# Patient Record
Sex: FEMALE | Race: WHITE | NOT HISPANIC OR LATINO | Employment: OTHER | URBAN - METROPOLITAN AREA
[De-identification: names, ages, dates, MRNs, and addresses within clinical notes are randomized per-mention and may not be internally consistent; named-entity substitution may affect disease eponyms.]

---

## 2017-02-10 RX ORDER — FOLIC ACID 1 MG/1
1 TABLET ORAL DAILY
Qty: 90 TABLET | Refills: 1 | Status: SHIPPED | OUTPATIENT
Start: 2017-02-10 | End: 2017-02-13 | Stop reason: SDUPTHER

## 2017-02-13 DIAGNOSIS — M05.79 RHEUMATOID ARTHRITIS INVOLVING MULTIPLE SITES WITH POSITIVE RHEUMATOID FACTOR: Chronic | ICD-10-CM

## 2017-02-13 DIAGNOSIS — Z79.899 HIGH RISK MEDICATION USE: Primary | ICD-10-CM

## 2017-02-13 RX ORDER — METHOTREXATE 2.5 MG/1
15 TABLET ORAL
Qty: 30 TABLET | Refills: 1 | OUTPATIENT
Start: 2017-02-13 | End: 2017-03-14

## 2017-02-13 RX ORDER — LEFLUNOMIDE 10 MG/1
10 TABLET ORAL DAILY
Qty: 30 TABLET | Refills: 2 | OUTPATIENT
Start: 2017-02-13 | End: 2018-02-13

## 2017-02-13 RX ORDER — FOLIC ACID 1 MG/1
1 TABLET ORAL DAILY
Qty: 90 TABLET | Refills: 1 | Status: SHIPPED | OUTPATIENT
Start: 2017-02-13 | End: 2017-03-03 | Stop reason: SDUPTHER

## 2017-02-14 ENCOUNTER — LAB VISIT (OUTPATIENT)
Dept: LAB | Facility: HOSPITAL | Age: 57
End: 2017-02-14
Attending: INTERNAL MEDICINE
Payer: COMMERCIAL

## 2017-02-14 DIAGNOSIS — M05.79 RHEUMATOID ARTHRITIS INVOLVING MULTIPLE SITES WITH POSITIVE RHEUMATOID FACTOR: Chronic | ICD-10-CM

## 2017-02-14 DIAGNOSIS — Z79.899 HIGH RISK MEDICATION USE: ICD-10-CM

## 2017-02-14 LAB
ALBUMIN SERPL BCP-MCNC: 4.2 G/DL
ALP SERPL-CCNC: 98 U/L
ALT SERPL W/O P-5'-P-CCNC: 56 U/L
ANION GAP SERPL CALC-SCNC: 11 MMOL/L
AST SERPL-CCNC: 34 U/L
BASOPHILS # BLD AUTO: 0.03 K/UL
BASOPHILS NFR BLD: 0.4 %
BILIRUB SERPL-MCNC: 0.2 MG/DL
BUN SERPL-MCNC: 9 MG/DL
CALCIUM SERPL-MCNC: 9.8 MG/DL
CHLORIDE SERPL-SCNC: 104 MMOL/L
CO2 SERPL-SCNC: 25 MMOL/L
CREAT SERPL-MCNC: 0.8 MG/DL
CRP SERPL-MCNC: 3.8 MG/L
DIFFERENTIAL METHOD: ABNORMAL
EOSINOPHIL # BLD AUTO: 0.1 K/UL
EOSINOPHIL NFR BLD: 1.6 %
ERYTHROCYTE [DISTWIDTH] IN BLOOD BY AUTOMATED COUNT: 14.3 %
EST. GFR  (AFRICAN AMERICAN): >60 ML/MIN/1.73 M^2
EST. GFR  (NON AFRICAN AMERICAN): >60 ML/MIN/1.73 M^2
GLUCOSE SERPL-MCNC: 87 MG/DL
HCT VFR BLD AUTO: 41 %
HGB BLD-MCNC: 13.6 G/DL
LYMPHOCYTES # BLD AUTO: 1.6 K/UL
LYMPHOCYTES NFR BLD: 21.1 %
MCH RBC QN AUTO: 32 PG
MCHC RBC AUTO-ENTMCNC: 33.2 %
MCV RBC AUTO: 97 FL
MONOCYTES # BLD AUTO: 0.8 K/UL
MONOCYTES NFR BLD: 10.3 %
NEUTROPHILS # BLD AUTO: 5.1 K/UL
NEUTROPHILS NFR BLD: 66.3 %
PLATELET # BLD AUTO: 263 K/UL
PMV BLD AUTO: 9.9 FL
POTASSIUM SERPL-SCNC: 3.7 MMOL/L
PROT SERPL-MCNC: 7.4 G/DL
RBC # BLD AUTO: 4.25 M/UL
SODIUM SERPL-SCNC: 140 MMOL/L
WBC # BLD AUTO: 7.66 K/UL

## 2017-02-14 PROCEDURE — 86140 C-REACTIVE PROTEIN: CPT

## 2017-02-14 PROCEDURE — 36415 COLL VENOUS BLD VENIPUNCTURE: CPT | Mod: PO

## 2017-02-14 PROCEDURE — 85025 COMPLETE CBC W/AUTO DIFF WBC: CPT

## 2017-02-14 PROCEDURE — 80053 COMPREHEN METABOLIC PANEL: CPT

## 2017-02-14 NOTE — TELEPHONE ENCOUNTER
Maru, this patient started leflunomide in Nov with f/u planned 3 mo but does not have lab or EP scheduled; please talk to her to get lab now and see how she is.

## 2017-02-15 DIAGNOSIS — M05.79 RHEUMATOID ARTHRITIS INVOLVING MULTIPLE SITES WITH POSITIVE RHEUMATOID FACTOR: Chronic | ICD-10-CM

## 2017-02-15 RX ORDER — CELECOXIB 100 MG/1
100 CAPSULE ORAL 2 TIMES DAILY PRN
Qty: 60 CAPSULE | Refills: 5 | Status: SHIPPED | OUTPATIENT
Start: 2017-02-15 | End: 2017-03-03 | Stop reason: CLARIF

## 2017-02-15 RX ORDER — LEFLUNOMIDE 10 MG/1
10 TABLET ORAL DAILY
Qty: 30 TABLET | Refills: 2 | Status: SHIPPED | OUTPATIENT
Start: 2017-02-15 | End: 2017-02-16 | Stop reason: SDUPTHER

## 2017-02-15 RX ORDER — METHOTREXATE 2.5 MG/1
15 TABLET ORAL
Qty: 30 TABLET | Refills: 1 | Status: SHIPPED | OUTPATIENT
Start: 2017-02-15 | End: 2017-02-27 | Stop reason: SDUPTHER

## 2017-02-15 NOTE — PROGRESS NOTES
[facility] sent Rx back , requiring prior auth.  I will send to [facility] to see if they can get authorization and fill the Rx.  ABHIJEET

## 2017-02-16 DIAGNOSIS — M05.79 RHEUMATOID ARTHRITIS INVOLVING MULTIPLE SITES WITH POSITIVE RHEUMATOID FACTOR: Chronic | ICD-10-CM

## 2017-02-16 RX ORDER — LEFLUNOMIDE 10 MG/1
10 TABLET ORAL DAILY
Qty: 30 TABLET | Refills: 2 | Status: SHIPPED | OUTPATIENT
Start: 2017-02-16 | End: 2017-02-27 | Stop reason: SDUPTHER

## 2017-02-27 DIAGNOSIS — M05.79 RHEUMATOID ARTHRITIS INVOLVING MULTIPLE SITES WITH POSITIVE RHEUMATOID FACTOR: Chronic | ICD-10-CM

## 2017-02-27 RX ORDER — LEFLUNOMIDE 10 MG/1
10 TABLET ORAL DAILY
Qty: 30 TABLET | Refills: 2 | Status: SHIPPED | OUTPATIENT
Start: 2017-02-27 | End: 2017-03-03 | Stop reason: SDUPTHER

## 2017-02-27 RX ORDER — METHOTREXATE 2.5 MG/1
15 TABLET ORAL
Qty: 30 TABLET | Refills: 2 | Status: SHIPPED | OUTPATIENT
Start: 2017-02-27 | End: 2017-03-03 | Stop reason: SDUPTHER

## 2017-03-03 ENCOUNTER — OFFICE VISIT (OUTPATIENT)
Dept: RHEUMATOLOGY | Facility: CLINIC | Age: 57
End: 2017-03-03
Payer: COMMERCIAL

## 2017-03-03 VITALS
HEART RATE: 82 BPM | BODY MASS INDEX: 32.54 KG/M2 | DIASTOLIC BLOOD PRESSURE: 87 MMHG | WEIGHT: 214.69 LBS | SYSTOLIC BLOOD PRESSURE: 150 MMHG | HEIGHT: 68 IN

## 2017-03-03 DIAGNOSIS — D84.9 IMMUNOSUPPRESSION: ICD-10-CM

## 2017-03-03 DIAGNOSIS — B37.31 VAGINAL YEAST INFECTION: ICD-10-CM

## 2017-03-03 DIAGNOSIS — J01.90 ACUTE RHINOSINUSITIS: Primary | ICD-10-CM

## 2017-03-03 DIAGNOSIS — M05.79 RHEUMATOID ARTHRITIS INVOLVING MULTIPLE SITES WITH POSITIVE RHEUMATOID FACTOR: Chronic | ICD-10-CM

## 2017-03-03 PROCEDURE — 99999 PR PBB SHADOW E&M-EST. PATIENT-LVL III: CPT | Mod: PBBFAC,,, | Performed by: INTERNAL MEDICINE

## 2017-03-03 PROCEDURE — 99214 OFFICE O/P EST MOD 30 MIN: CPT | Mod: S$GLB,,, | Performed by: INTERNAL MEDICINE

## 2017-03-03 PROCEDURE — 1160F RVW MEDS BY RX/DR IN RCRD: CPT | Mod: S$GLB,,, | Performed by: INTERNAL MEDICINE

## 2017-03-03 RX ORDER — DOXYCYCLINE HYCLATE 100 MG
100 TABLET ORAL 2 TIMES DAILY
Qty: 10 TABLET | Refills: 0 | Status: SHIPPED | OUTPATIENT
Start: 2017-03-03 | End: 2017-09-13

## 2017-03-03 RX ORDER — FLUCONAZOLE 150 MG/1
TABLET ORAL
Qty: 2 TABLET | Refills: 2 | Status: SHIPPED | OUTPATIENT
Start: 2017-03-03 | End: 2017-06-28 | Stop reason: SDUPTHER

## 2017-03-03 RX ORDER — METHOTREXATE 2.5 MG/1
15 TABLET ORAL
Qty: 78 TABLET | Refills: 0 | Status: SHIPPED | OUTPATIENT
Start: 2017-03-03 | End: 2017-05-23 | Stop reason: SDUPTHER

## 2017-03-03 RX ORDER — LEFLUNOMIDE 20 MG/1
20 TABLET ORAL DAILY
Qty: 90 TABLET | Refills: 0 | Status: SHIPPED | OUTPATIENT
Start: 2017-03-03 | End: 2017-03-27 | Stop reason: SDUPTHER

## 2017-03-03 RX ORDER — FOLIC ACID 1 MG/1
1 TABLET ORAL DAILY
Qty: 90 TABLET | Refills: 3 | Status: SHIPPED | OUTPATIENT
Start: 2017-03-03 | End: 2017-09-13 | Stop reason: SDUPTHER

## 2017-03-03 RX ORDER — MELOXICAM 15 MG/1
15 TABLET ORAL DAILY
Qty: 90 TABLET | Refills: 3 | Status: SHIPPED | OUTPATIENT
Start: 2017-03-03 | End: 2017-06-12 | Stop reason: SDUPTHER

## 2017-03-03 ASSESSMENT — ROUTINE ASSESSMENT OF PATIENT INDEX DATA (RAPID3)
PATIENT GLOBAL ASSESSMENT SCORE: 7.5
TOTAL RAPID3 SCORE: 6.66
PAIN SCORE: 7.5
AM STIFFNESS SCORE: 1, YES
FATIGUE SCORE: 7.5
PSYCHOLOGICAL DISTRESS SCORE: 7.7
WHEN YOU AWAKENED IN THE MORNING OVER THE LAST WEEK, PLEASE INDICATE THE AMOUNT OF TIME IT TAKES UNTIL YOU ARE AS LIMBER AS YOU WILL BE FOR THE DAY: 2 HOURS
MDHAQ FUNCTION SCORE: 1.5

## 2017-03-03 NOTE — PROGRESS NOTES
"Subjective:       Patient ID: Sherrell Yip is a 57 y.o. female.    Chief Complaint: Disease Management            Associated symptoms include fatigue, fever and headaches.          James with school system       CCP+ RA with positive ANTONIA    MTX since Oct 2015     mg/d since April 2016 -Nov 2016 when switched to LFA  LFA added Nov 2016  Prednisone bridge 1 month, July 2015  Celebrex      Currently:  MTX 15 mg / week  LFA 10 mg/d  Ibuprofen 800 mg prescribed TID but she is taking BID     Also cymbalta for depression and neck pain from disk and LUE pain after breast surgery  Hx Breast Ca, s/p Femara for 5 years    Off of work since October 12; now extended from short to long term disability    More joint pain B shoulders; arm pain disrupts sleep  Hands still swollen and pain with activities  Notes hands "turning" more  L hip pain  R bunion pain  Has cut back on house work and cooking    2 hours morning stiffness    Review of Systems   Constitutional: Positive for fatigue, fever and unexpected weight change.   HENT: Positive for mouth sores, postnasal drip and rhinorrhea.         Dry mouth   Eyes: Positive for redness.        Dry eyes   Respiratory: Negative for cough and shortness of breath.    Cardiovascular: Negative for chest pain.   Gastrointestinal: Negative for abdominal pain and diarrhea.   Skin: Positive for rash.        Dry patches of skin on arms   Neurological: Positive for headaches.   Psychiatric/Behavioral: Negative for sleep disturbance.         Objective:     BP (!) 150/87  Pulse 82  Ht 5' 8" (1.727 m)  Wt 97.4 kg (214 lb 11.2 oz)  BMI 32.65 kg/m2     Physical Exam   Constitutional: She is well-developed, well-nourished, and in no distress.   HENT:   Mouth/Throat: Oropharynx is clear and moist.   Mucus nasal d/c   Eyes: Conjunctivae are normal.   Cardiovascular: Normal heart sounds.    Pulmonary/Chest: Breath sounds normal.       Right Side Rheumatological Exam     The patient is tender to " palpation of the shoulder, wrist, 1st MCP, 2nd MCP and 3rd MCP    She has swelling of the wrist, 1st MCP, 2nd MCP and 3rd MCP    Left Side Rheumatological Exam     The patient is tender to palpation of the shoulder, elbow, wrist, 1st MCP, 2nd MCP, 3rd MCP and hip.    She has swelling of the elbow, wrist, 1st MCP, 2nd MCP, 3rd MCP and 4th PIP      Lymphadenopathy:     She has no cervical adenopathy.   Neurological: She is alert.   Skin: No rash noted.     Musculoskeletal:   Fingers wrists are swollen and tender  L elbow is swollen tender and has a flexion contracture  Shoulders are tender  L Hip are tender on rotation      Feb 14  C-reactive protein 3.8  Assessment:       1. Acute rhinosinusitis    2. Rheumatoid arthritis involving multiple sites with positive rheumatoid factor    3. Immunosuppression    4. Vaginal yeast infection        Rheumatoid arthritis activity has not improved with switch from HCQ to LFA on background mtx    Sl increased AST on current therapy    She has early deformities including the L elbow flexion contracture     She is not able to return to work.  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    Plan:     1.  Increase LFA to 20 mg/d  2. Cont MTX 15 mg/week  3. Check lab monthly to monitor liver enzymes, CBC, and crp while on combination therapy  4. Unable to return to work   5. Doxycycline for URI, Diflucan for vaginal yeast  6. Replace ibuprofen with meloxicam 15 mg/d  7. F/u BP with PCP  8.   Return to me in 3 months if not sooner; next step is biologic DMARD therapy

## 2017-03-03 NOTE — MR AVS SNAPSHOT
[facility]  [address]  [facility] [address]  Phone: [phone]  Fax: [phone]                  Sherrell Davisirez   3/3/2017 11:00 AM   Office Visit    Description:  Female : 1960   Provider:  Kian Buchanan MD   Department:  [facility]           Reason for Visit     Disease Management           Diagnoses this Visit        Comments    Acute rhinosinusitis    -  Primary     Rheumatoid arthritis involving multiple sites with positive rheumatoid factor         Immunosuppression         Vaginal yeast infection                To Do List           Goals (5 Years of Data)     None       These Medications        Disp Refills Start End    leflunomide (ARAVA) 20 MG Tab 90 tablet 0 3/3/2017 3/3/2018    Take 1 tablet (20 mg total) by mouth once daily. - Oral    Pharmacy: [facility] - [address] Ph #: [phone]       meloxicam (MOBIC) 15 MG tablet 90 tablet 3 3/3/2017     Take 1 tablet (15 mg total) by mouth once daily. - Oral    Pharmacy: [facility] - [address] Ph #: [phone]       methotrexate 2.5 MG Tab 78 tablet 0 3/3/2017 2017    Take 6 tablets (15 mg total) by mouth every 7 days. - Oral    Pharmacy: [facility] - [address] Ph #: [phone]       folic acid (FOLVITE) 1 MG tablet 90 tablet 3 3/3/2017 3/3/2018    Take 1 tablet (1 mg total) by mouth once daily. - Oral    Pharmacy: [facility] - [address] Ph #: [phone]       fluconazole (DIFLUCAN) 150 MG Tab 2 tablet 2 3/3/2017     Take one pill by mouth then repeat in 3 days if needed.    Pharmacy: [facility] #[id number] - [place] - [address] [facility] Ph #: [phone]       doxycycline (VIBRA-TABS) 100 MG tablet 10 tablet 0 3/3/2017     Take 1 tablet (100 mg total) by mouth 2 (two) times daily. - Oral     Pharmacy: [facility] #[id number] - [place] - [address]  #: [phone]         [facility]     [facility] On Call Nurse [facility] Line - 24/7 Assistance  Registered nurses in the [facility] On Call Center provide clinical advisement, health education, appointment booking, and other advisory services.  Call for this free service at [phone].             Medications           Message regarding Medications     Verify the changes and/or additions to your medication regime listed below are the same as discussed with your clinician today.  If any of these changes or additions are incorrect, please notify your healthcare provider.        START taking these NEW medications        Refills    meloxicam (MOBIC) 15 MG tablet 3    Sig: Take 1 tablet (15 mg total) by mouth once daily.    Class: Normal    Route: Oral    doxycycline (VIBRA-TABS) 100 MG tablet 0    Sig: Take 1 tablet (100 mg total) by mouth 2 (two) times daily.    Class: Normal    Route: Oral      CHANGE how you are taking these medications     Start Taking Instead of    leflunomide (ARAVA) 20 MG Tab leflunomide (ARAVA) 10 MG Tab    Dosage:  Take 1 tablet (20 mg total) by mouth once daily. Dosage:  Take 1 tablet (10 mg total) by mouth once daily.    Reason for Change:  Reorder       STOP taking these medications     celecoxib (CELEBREX) 100 MG capsule Take 1 capsule (100 mg total) by mouth 2 (two) times daily as needed for Pain (arthritis).           Verify that the below list of medications is an accurate representation of the medications you are currently taking.  If none reported, the list may be blank. If incorrect, please contact your healthcare provider. Carry this list with you in case of emergency.           Current Medications     alendronate (FOSAMAX) 70 MG tablet     duloxetine (CYMBALTA) 30 MG capsule Take 30 mg by mouth nightly.    fluconazole (DIFLUCAN) 150 MG Tab Take one pill by mouth then repeat in 3 days if needed.    FOLBIC 2.5-25-2  "mg Tab Take 1 tablet by mouth once daily.    folic acid (FOLVITE) 1 MG tablet Take 1 tablet (1 mg total) by mouth once daily.    furosemide (LASIX) 20 MG tablet     hydrochlorothiazide (HYDRODIURIL) 25 MG tablet Take 25 mg by mouth once daily.    leflunomide (ARAVA) 20 MG Tab Take 1 tablet (20 mg total) by mouth once daily.    methotrexate 2.5 MG Tab Take 6 tablets (15 mg total) by mouth every 7 days.    METOPROLOL SUCCINATE (TOPROL XL ORAL) Take by mouth.    potassium chloride (MICRO-K) 10 MEQ CpSR Take 10 mEq by mouth once daily.    SYNTHROID 112 mcg tablet Take 112 mcg by mouth once daily.    doxycycline (VIBRA-TABS) 100 MG tablet Take 1 tablet (100 mg total) by mouth 2 (two) times daily.    meloxicam (MOBIC) 15 MG tablet Take 1 tablet (15 mg total) by mouth once daily.           Clinical Reference Information           Your Vitals Were     BP Pulse Height Weight BMI    150/87 82 5' 8" (1.727 m) 97.4 kg (214 lb 11.2 oz) 32.65 kg/m2      Blood Pressure          Most Recent Value    BP  (!)  150/87      Allergies as of 3/3/2017     Penicillins    Sulfa (Sulfonamide Antibiotics)    Flagyl [Metronidazole Hcl]      Immunizations Administered on Date of Encounter - 3/3/2017     None      [facility] Sign-Up     Activating your [facility] account is as easy as 1-2-3!     1) Visit [email or web address], select Sign Up Now, enter this activation code and your date of birth, then select Next.  Activation code not generated  Current Patient Portal Status: Account disabled      2) Create a username and password to use when you visit [facility] in the future and select a security question in case you lose your password and select Next.    3) Enter your e-mail address and click Sign Up!    Additional Information  If you have questions, please e-mail [email or web address] or call [phone] to talk to our [facility] staff. Remember, [facility] is NOT to be used for urgent needs. For medical emergencies, dial 911.         Language " Assistance Services     ATTENTION: Language assistance services are available, free of charge. Please call [phone].      ATENCIÓN: Si habla jamaañol, tiene a healy disposición servicios gratuitos de asistencia lingüística. Llame al [phone].     CHÚ Ý: N?u b?n nói Ti?ng Vi?t, có các d?ch v? h? tr? ngôn ng? mi?n phí dành cho b?n. G?i s? [phone].         [facility] complies with applicable Federal civil rights laws and does not discriminate on the basis of race, color, national origin, age, disability, or sex.

## 2017-03-18 ENCOUNTER — LAB VISIT (OUTPATIENT)
Dept: LAB | Facility: HOSPITAL | Age: 57
End: 2017-03-18
Attending: INTERNAL MEDICINE
Payer: COMMERCIAL

## 2017-03-18 DIAGNOSIS — Z79.899 HIGH RISK MEDICATION USE: ICD-10-CM

## 2017-03-18 DIAGNOSIS — M05.79 RHEUMATOID ARTHRITIS INVOLVING MULTIPLE SITES WITH POSITIVE RHEUMATOID FACTOR: Chronic | ICD-10-CM

## 2017-03-18 LAB
ALBUMIN SERPL BCP-MCNC: 4 G/DL
ALP SERPL-CCNC: 93 U/L
ALT SERPL W/O P-5'-P-CCNC: 63 U/L
ANION GAP SERPL CALC-SCNC: 7 MMOL/L
AST SERPL-CCNC: 45 U/L
BASOPHILS # BLD AUTO: 0.05 K/UL
BASOPHILS NFR BLD: 1.1 %
BILIRUB SERPL-MCNC: 0.5 MG/DL
BUN SERPL-MCNC: 9 MG/DL
CALCIUM SERPL-MCNC: 9.5 MG/DL
CHLORIDE SERPL-SCNC: 106 MMOL/L
CO2 SERPL-SCNC: 28 MMOL/L
CREAT SERPL-MCNC: 0.8 MG/DL
CRP SERPL-MCNC: 6.1 MG/L
DIFFERENTIAL METHOD: ABNORMAL
EOSINOPHIL # BLD AUTO: 0.1 K/UL
EOSINOPHIL NFR BLD: 3 %
ERYTHROCYTE [DISTWIDTH] IN BLOOD BY AUTOMATED COUNT: 14.8 %
EST. GFR  (AFRICAN AMERICAN): >60 ML/MIN/1.73 M^2
EST. GFR  (NON AFRICAN AMERICAN): >60 ML/MIN/1.73 M^2
GLUCOSE SERPL-MCNC: 96 MG/DL
HCT VFR BLD AUTO: 40.5 %
HGB BLD-MCNC: 13.1 G/DL
LYMPHOCYTES # BLD AUTO: 1.2 K/UL
LYMPHOCYTES NFR BLD: 25.6 %
MCH RBC QN AUTO: 31.6 PG
MCHC RBC AUTO-ENTMCNC: 32.3 %
MCV RBC AUTO: 98 FL
MONOCYTES # BLD AUTO: 0.6 K/UL
MONOCYTES NFR BLD: 13.2 %
NEUTROPHILS # BLD AUTO: 2.7 K/UL
NEUTROPHILS NFR BLD: 56.7 %
PLATELET # BLD AUTO: 266 K/UL
PMV BLD AUTO: 10.2 FL
POTASSIUM SERPL-SCNC: 4.2 MMOL/L
PROT SERPL-MCNC: 7.1 G/DL
RBC # BLD AUTO: 4.14 M/UL
SODIUM SERPL-SCNC: 141 MMOL/L
WBC # BLD AUTO: 4.69 K/UL

## 2017-03-18 PROCEDURE — 86140 C-REACTIVE PROTEIN: CPT

## 2017-03-18 PROCEDURE — 80053 COMPREHEN METABOLIC PANEL: CPT

## 2017-03-18 PROCEDURE — 36415 COLL VENOUS BLD VENIPUNCTURE: CPT | Mod: PO

## 2017-03-18 PROCEDURE — 85025 COMPLETE CBC W/AUTO DIFF WBC: CPT

## 2017-03-21 ENCOUNTER — TELEPHONE (OUTPATIENT)
Dept: RHEUMATOLOGY | Facility: CLINIC | Age: 57
End: 2017-03-21

## 2017-03-23 ENCOUNTER — TELEPHONE (OUTPATIENT)
Dept: RHEUMATOLOGY | Facility: CLINIC | Age: 57
End: 2017-03-23

## 2017-03-23 DIAGNOSIS — M05.79 RHEUMATOID ARTHRITIS INVOLVING MULTIPLE SITES WITH POSITIVE RHEUMATOID FACTOR: Primary | ICD-10-CM

## 2017-03-23 NOTE — TELEPHONE ENCOUNTER
Spoke to patient.  MTX + LFA causing elevated liver enzymes  Lower doses of meds have not been effective.   Need to change to MTX plus TNF inhibitor to control her RA and avoid liver toxicity.  Discussed TB and lymphoma risk.  She is willing to start Enbrel.  I will order Enbrel to replace leflunomide. Cont MTX.

## 2017-03-23 NOTE — TELEPHONE ENCOUNTER
Patient informed of test results, and Dr. Buchanan will call her to discuss.  Verbalizes understanding.

## 2017-03-23 NOTE — TELEPHONE ENCOUNTER
Do not forget to call patient in regards to discuss switching her medication, due to liver enzymes elevation.

## 2017-03-24 ENCOUNTER — TELEPHONE (OUTPATIENT)
Dept: PHARMACY | Facility: CLINIC | Age: 57
End: 2017-03-24

## 2017-03-24 DIAGNOSIS — Z79.899 HIGH RISK MEDICATION USE: Primary | ICD-10-CM

## 2017-03-24 NOTE — TELEPHONE ENCOUNTER
I do not see one either . 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     Maru please contact her to get Quantiferon done.

## 2017-03-24 NOTE — TELEPHONE ENCOUNTER
Dr Buchanan,    I dont see a quant gold on file for this patient starting Enbrel.  Is it hidden somewheres that I'm missing or has it not been drawn/ordered as of yet?    Juan Alberto Juarez, PharmD, [facility]  [facility]  [phone]

## 2017-03-27 ENCOUNTER — TELEPHONE (OUTPATIENT)
Dept: RHEUMATOLOGY | Facility: CLINIC | Age: 57
End: 2017-03-27

## 2017-03-27 DIAGNOSIS — M05.79 RHEUMATOID ARTHRITIS INVOLVING MULTIPLE SITES WITH POSITIVE RHEUMATOID FACTOR: Chronic | ICD-10-CM

## 2017-03-27 RX ORDER — IBUPROFEN 800 MG/1
TABLET ORAL
Refills: 1 | COMMUNITY
Start: 2017-03-22 | End: 2017-06-12

## 2017-03-27 RX ORDER — LEFLUNOMIDE 10 MG/1
10 TABLET ORAL DAILY
Qty: 90 TABLET | Refills: 0
Start: 2017-03-27 | End: 2017-06-12 | Stop reason: SDUPTHER

## 2017-03-27 NOTE — TELEPHONE ENCOUNTER
She is afraid to take Enbrel because of hx of breast cancer in 2010  She would like to continue meloxicam, leflunomide, and MTX  Reduce LFA to 10 mg/d; cont MTX and meloxicam    D/c Enbrel that she did not start yet    Keep scheduled lab and f/u appts

## 2017-03-27 NOTE — TELEPHONE ENCOUNTER
----- Message from Lizabeth Palma sent at 3/27/2017 11:45 AM CDT -----  Contact: [email or web address]  Patient has questions about the medication which is enbrel patient states its urgent please call patient.

## 2017-03-27 NOTE — TELEPHONE ENCOUNTER
Patient wants to speak to you regarding enbrel.. She is having second thoughts about medication, and wants to discuss, if there is any other medication that she can take.  Please call.

## 2017-03-28 NOTE — TELEPHONE ENCOUNTER
Dr Buchanan,      We are signing off of this case.    Please let us know if we can be of any further assistance for Ms Yip in the future.    All the best.    Juan Alberto Juarez, PharmD, [facility]  [facility]  [phone]

## 2017-04-15 ENCOUNTER — LAB VISIT (OUTPATIENT)
Dept: LAB | Facility: HOSPITAL | Age: 57
End: 2017-04-15
Attending: INTERNAL MEDICINE
Payer: COMMERCIAL

## 2017-04-15 DIAGNOSIS — M05.79 RHEUMATOID ARTHRITIS INVOLVING MULTIPLE SITES WITH POSITIVE RHEUMATOID FACTOR: Chronic | ICD-10-CM

## 2017-04-15 DIAGNOSIS — Z79.899 HIGH RISK MEDICATION USE: ICD-10-CM

## 2017-04-15 LAB
ALBUMIN SERPL BCP-MCNC: 3.6 G/DL
ALP SERPL-CCNC: 87 U/L
ALT SERPL W/O P-5'-P-CCNC: 59 U/L
ANION GAP SERPL CALC-SCNC: 10 MMOL/L
AST SERPL-CCNC: 41 U/L
BASOPHILS # BLD AUTO: 0.03 K/UL
BASOPHILS NFR BLD: 0.7 %
BILIRUB SERPL-MCNC: 0.4 MG/DL
BUN SERPL-MCNC: 7 MG/DL
CALCIUM SERPL-MCNC: 9 MG/DL
CHLORIDE SERPL-SCNC: 107 MMOL/L
CO2 SERPL-SCNC: 25 MMOL/L
CREAT SERPL-MCNC: 0.7 MG/DL
CRP SERPL-MCNC: 5.2 MG/L
DIFFERENTIAL METHOD: ABNORMAL
EOSINOPHIL # BLD AUTO: 0.2 K/UL
EOSINOPHIL NFR BLD: 3.6 %
ERYTHROCYTE [DISTWIDTH] IN BLOOD BY AUTOMATED COUNT: 14.3 %
EST. GFR  (AFRICAN AMERICAN): >60 ML/MIN/1.73 M^2
EST. GFR  (NON AFRICAN AMERICAN): >60 ML/MIN/1.73 M^2
GLUCOSE SERPL-MCNC: 101 MG/DL
HCT VFR BLD AUTO: 39.5 %
HGB BLD-MCNC: 13 G/DL
LYMPHOCYTES # BLD AUTO: 0.9 K/UL
LYMPHOCYTES NFR BLD: 21 %
MCH RBC QN AUTO: 31.8 PG
MCHC RBC AUTO-ENTMCNC: 32.9 %
MCV RBC AUTO: 97 FL
MONOCYTES # BLD AUTO: 0.7 K/UL
MONOCYTES NFR BLD: 17.1 %
NEUTROPHILS # BLD AUTO: 2.4 K/UL
NEUTROPHILS NFR BLD: 57.4 %
PLATELET # BLD AUTO: 238 K/UL
PMV BLD AUTO: 9.7 FL
POTASSIUM SERPL-SCNC: 3.9 MMOL/L
PROT SERPL-MCNC: 6.8 G/DL
RBC # BLD AUTO: 4.09 M/UL
SODIUM SERPL-SCNC: 142 MMOL/L
WBC # BLD AUTO: 4.15 K/UL

## 2017-04-15 PROCEDURE — 80053 COMPREHEN METABOLIC PANEL: CPT

## 2017-04-15 PROCEDURE — 85025 COMPLETE CBC W/AUTO DIFF WBC: CPT

## 2017-04-15 PROCEDURE — 86140 C-REACTIVE PROTEIN: CPT

## 2017-04-15 PROCEDURE — 36415 COLL VENOUS BLD VENIPUNCTURE: CPT | Mod: PO

## 2017-04-19 ENCOUNTER — TELEPHONE (OUTPATIENT)
Dept: RHEUMATOLOGY | Facility: CLINIC | Age: 57
End: 2017-04-19

## 2017-04-19 NOTE — TELEPHONE ENCOUNTER
----- Message from Kian Buchanan MD sent at 4/19/2017 10:26 AM CDT -----  Tell her liver enzymes are not normal but have improved and she can continue the current medicines.

## 2017-04-26 ENCOUNTER — TELEPHONE (OUTPATIENT)
Dept: RHEUMATOLOGY | Facility: CLINIC | Age: 57
End: 2017-04-26

## 2017-05-11 ENCOUNTER — LAB VISIT (OUTPATIENT)
Dept: LAB | Facility: HOSPITAL | Age: 57
End: 2017-05-11
Attending: INTERNAL MEDICINE
Payer: COMMERCIAL

## 2017-05-11 DIAGNOSIS — M05.79 RHEUMATOID ARTHRITIS INVOLVING MULTIPLE SITES WITH POSITIVE RHEUMATOID FACTOR: Chronic | ICD-10-CM

## 2017-05-11 DIAGNOSIS — Z79.899 HIGH RISK MEDICATION USE: ICD-10-CM

## 2017-05-11 LAB
ALBUMIN SERPL BCP-MCNC: 3.8 G/DL
ALP SERPL-CCNC: 89 U/L
ALT SERPL W/O P-5'-P-CCNC: 78 U/L
ANION GAP SERPL CALC-SCNC: 11 MMOL/L
AST SERPL-CCNC: 49 U/L
BASOPHILS # BLD AUTO: 0.04 K/UL
BASOPHILS NFR BLD: 1.1 %
BILIRUB SERPL-MCNC: 0.4 MG/DL
BUN SERPL-MCNC: 7 MG/DL
CALCIUM SERPL-MCNC: 9.2 MG/DL
CHLORIDE SERPL-SCNC: 107 MMOL/L
CO2 SERPL-SCNC: 22 MMOL/L
CREAT SERPL-MCNC: 0.8 MG/DL
CRP SERPL-MCNC: 4.6 MG/L
DIFFERENTIAL METHOD: ABNORMAL
EOSINOPHIL # BLD AUTO: 0.1 K/UL
EOSINOPHIL NFR BLD: 3.2 %
ERYTHROCYTE [DISTWIDTH] IN BLOOD BY AUTOMATED COUNT: 14.2 %
EST. GFR  (AFRICAN AMERICAN): >60 ML/MIN/1.73 M^2
EST. GFR  (NON AFRICAN AMERICAN): >60 ML/MIN/1.73 M^2
GLUCOSE SERPL-MCNC: 128 MG/DL
HCT VFR BLD AUTO: 39.1 %
HGB BLD-MCNC: 13.1 G/DL
LYMPHOCYTES # BLD AUTO: 0.8 K/UL
LYMPHOCYTES NFR BLD: 22.3 %
MCH RBC QN AUTO: 32.3 PG
MCHC RBC AUTO-ENTMCNC: 33.5 %
MCV RBC AUTO: 96 FL
MONOCYTES # BLD AUTO: 0.5 K/UL
MONOCYTES NFR BLD: 11.9 %
NEUTROPHILS # BLD AUTO: 2.3 K/UL
NEUTROPHILS NFR BLD: 61.5 %
PLATELET # BLD AUTO: 241 K/UL
PMV BLD AUTO: 9.5 FL
POTASSIUM SERPL-SCNC: 3.9 MMOL/L
PROT SERPL-MCNC: 7 G/DL
RBC # BLD AUTO: 4.06 M/UL
SODIUM SERPL-SCNC: 140 MMOL/L
WBC # BLD AUTO: 3.77 K/UL

## 2017-05-11 PROCEDURE — 80053 COMPREHEN METABOLIC PANEL: CPT

## 2017-05-11 PROCEDURE — 86140 C-REACTIVE PROTEIN: CPT

## 2017-05-11 PROCEDURE — 36415 COLL VENOUS BLD VENIPUNCTURE: CPT | Mod: PO

## 2017-05-11 PROCEDURE — 85025 COMPLETE CBC W/AUTO DIFF WBC: CPT

## 2017-05-12 ENCOUNTER — TELEPHONE (OUTPATIENT)
Dept: RHEUMATOLOGY | Facility: CLINIC | Age: 57
End: 2017-05-12

## 2017-05-12 NOTE — TELEPHONE ENCOUNTER
----- Message from Kian Buchanan MD sent at 5/11/2017  5:18 PM CDT -----  Ast/alt still elevated on MTX and LFA  Will discuss when patient returns next month.

## 2017-05-12 NOTE — TELEPHONE ENCOUNTER
Informed of lab results and will discuss further at next clinic visit on 6/12/17.  Verbalizes understanding.

## 2017-05-23 DIAGNOSIS — M05.79 RHEUMATOID ARTHRITIS INVOLVING MULTIPLE SITES WITH POSITIVE RHEUMATOID FACTOR: Chronic | ICD-10-CM

## 2017-05-23 RX ORDER — METHOTREXATE 2.5 MG/1
TABLET ORAL
Qty: 78 TABLET | Refills: 0 | Status: SHIPPED | OUTPATIENT
Start: 2017-05-23 | End: 2017-08-21 | Stop reason: SDUPTHER

## 2017-06-12 ENCOUNTER — OFFICE VISIT (OUTPATIENT)
Dept: RHEUMATOLOGY | Facility: CLINIC | Age: 57
End: 2017-06-12
Payer: COMMERCIAL

## 2017-06-12 VITALS
DIASTOLIC BLOOD PRESSURE: 93 MMHG | WEIGHT: 211 LBS | HEIGHT: 68 IN | HEART RATE: 80 BPM | SYSTOLIC BLOOD PRESSURE: 136 MMHG | BODY MASS INDEX: 31.98 KG/M2

## 2017-06-12 DIAGNOSIS — R76.8 ANA POSITIVE: ICD-10-CM

## 2017-06-12 DIAGNOSIS — M05.79 RHEUMATOID ARTHRITIS INVOLVING MULTIPLE SITES WITH POSITIVE RHEUMATOID FACTOR: Primary | Chronic | ICD-10-CM

## 2017-06-12 DIAGNOSIS — Z79.899 HIGH RISK MEDICATION USE: ICD-10-CM

## 2017-06-12 PROCEDURE — 99214 OFFICE O/P EST MOD 30 MIN: CPT | Mod: S$GLB,,, | Performed by: INTERNAL MEDICINE

## 2017-06-12 PROCEDURE — 99999 PR PBB SHADOW E&M-EST. PATIENT-LVL III: CPT | Mod: PBBFAC,,, | Performed by: INTERNAL MEDICINE

## 2017-06-12 RX ORDER — MELOXICAM 15 MG/1
15 TABLET ORAL DAILY PRN
Qty: 90 TABLET | Refills: 3 | Status: SHIPPED | OUTPATIENT
Start: 2017-06-12 | End: 2018-07-01 | Stop reason: SDUPTHER

## 2017-06-12 RX ORDER — HYDROXYCHLOROQUINE SULFATE 200 MG/1
200 TABLET, FILM COATED ORAL 2 TIMES DAILY
Qty: 180 TABLET | Refills: 1 | Status: SHIPPED | OUTPATIENT
Start: 2017-06-12 | End: 2017-09-13 | Stop reason: SDUPTHER

## 2017-06-12 RX ORDER — LEFLUNOMIDE 10 MG/1
10 TABLET ORAL DAILY
Qty: 90 TABLET | Refills: 0
Start: 2017-06-12 | End: 2017-09-13 | Stop reason: SINTOL

## 2017-06-12 ASSESSMENT — ROUTINE ASSESSMENT OF PATIENT INDEX DATA (RAPID3)
WHEN YOU AWAKENED IN THE MORNING OVER THE LAST WEEK, PLEASE INDICATE THE AMOUNT OF TIME IT TAKES UNTIL YOU ARE AS LIMBER AS YOU WILL BE FOR THE DAY: 45 MINUTES
MDHAQ FUNCTION SCORE: .7
FATIGUE SCORE: 5.5
PATIENT GLOBAL ASSESSMENT SCORE: 5.5
TOTAL RAPID3 SCORE: 4.44
AM STIFFNESS SCORE: 1, YES
PAIN SCORE: 5.5
PSYCHOLOGICAL DISTRESS SCORE: 5.5

## 2017-06-12 NOTE — PROGRESS NOTES
Subjective:       Patient ID: Sherrell Yip is a 57 y.o. female.    Chief Complaint: Disease Management            Associated symptoms include fatigue and fever. Pertinent negatives include no headaches.          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   Just got approved for SSI      CCP+ RA with positive ANTONIA    MTX since Oct 2015     mg/d since April 2016 -Nov 2016 when switched to LFA  LFA added Nov 2016  Prednisone bridge 1 month, July 2015  NSAID: hx Celebrex; hx ibuprofen caused stomach ache      Currently:  MTX 15 mg / week  LFA 10 mg/d  Meloxicam 15 mg some days     Also cymbalta for depression and neck pain from disk and LUE pain after breast surgery  Hx Breast Ca, s/p Femara for 5 years    Off of work since October 12; now extended from short to long term disability and qualified for Social Security  History of difficulty with household activities reinforces her inability to work    L hip pain  Less hand pain but some finger pain  Occasional fever  Not active like used to be  If she tries to do anything, that night or next day she will hurt  Daughter says she still does a lot but not like used to  Sometimes night pain but poor sleep is same as usual  Still swells in PIP joints and cannot get ring on and off  Cannot go up stairs due to knees  45 min morning stiffness    May had 2 GI episodes with N/V/D x 1.5-2 days; episodes 2 weeks apart    Still afraid to try biologic because of hx breast Ca and cousin hx of lymphoma    Review of Systems   Constitutional: Positive for fatigue, fever and unexpected weight change.        Says fever is less than before; maybe once every 1-2 weeks   HENT: Positive for mouth sores, postnasal drip and rhinorrhea.         Dry mouth   Eyes: Negative for redness.        Dry eyes   Respiratory: Negative for cough and shortness of breath.    Cardiovascular: Negative for chest pain.   Gastrointestinal: Positive for diarrhea. Negative for abdominal pain.        3 or 4 BM / d ; loose  "  Skin: Positive for rash.        Dry patches of skin on arms   Neurological: Negative for headaches.   Psychiatric/Behavioral: Negative for sleep disturbance.         Objective:     BP (!) 136/93   Pulse 80   Ht 5' 8" (1.727 m)   Wt 95.7 kg (211 lb)   BMI 32.08 kg/m²      Physical Exam   Constitutional: She is well-developed, well-nourished, and in no distress.   HENT:   Mouth/Throat: Oropharynx is clear and moist.   Eyes: Conjunctivae are normal.   Cardiovascular: Normal heart sounds.    Pulmonary/Chest: Breath sounds normal.       Right Side Rheumatological Exam     The patient is tender to palpation of the shoulder, wrist, 1st MCP, 2nd MCP and 3rd MCP    She has swelling of the wrist, 1st MCP, 2nd MCP and 3rd MCP    Left Side Rheumatological Exam     The patient is tender to palpation of the shoulder, elbow, wrist, 1st MCP, 2nd MCP, 3rd MCP and hip.    She has swelling of the elbow, wrist, 1st MCP, 2nd MCP, 3rd MCP and 4th PIP      Lymphadenopathy:     She has no cervical adenopathy.   Neurological: She is alert.   Skin: No rash noted.     Sl erythema v of neck and UE in photodistribution   Musculoskeletal:   mcp swelling and tenderness  L elbow is sl swollen tender and has less flexion contracture  Shoulders are not tender  L Hip are tender on rotation  Knees, ankles, feet         Lab Results   Component Value Date    WBC 3.77 (L) 05/11/2017    HGB 13.1 05/11/2017    HCT 39.1 05/11/2017    MCV 96 05/11/2017     05/11/2017      Lab Results   Component Value Date    CREATININE 0.8 05/11/2017     Lab Results   Component Value Date    ALBUMIN 3.8 05/11/2017      Lab Results   Component Value Date    ALT 78 (H) 05/11/2017      Lab Results   Component Value Date    CRP 4.6 05/11/2017      Assessment:       1. Rheumatoid arthritis involving multiple sites with positive rheumatoid factor    2. High risk medication use    3. ANTONIA positive:         Rheumatoid arthritis activity slightly improved on combination " therapy with methotrexate and leflunomide.    Mild  increased hepatic transaminase on current therapy   She has early deformities including the R elbow flexion contracture and right wrist    She is not able to return to work.  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    Plan:     1.  Add  bid  2. Cont LFA 10 mg and  MTX 15 mg/week  3. Schedule lab in Sept 4.   Return to me in Sept with lab

## 2017-06-28 DIAGNOSIS — B37.31 VAGINAL YEAST INFECTION: ICD-10-CM

## 2017-06-28 DIAGNOSIS — D84.9 IMMUNOSUPPRESSION: ICD-10-CM

## 2017-06-28 RX ORDER — FLUCONAZOLE 150 MG/1
TABLET ORAL
Qty: 2 TABLET | Refills: 2 | Status: SHIPPED | OUTPATIENT
Start: 2017-06-28 | End: 2017-09-13 | Stop reason: SDUPTHER

## 2017-08-03 ENCOUNTER — HOSPITAL ENCOUNTER (OUTPATIENT)
Dept: RADIOLOGY | Facility: HOSPITAL | Age: 57
Discharge: HOME | End: 2017-08-03
Attending: SURGERY
Payer: COMMERCIAL

## 2017-08-03 DIAGNOSIS — C50.911 MALIGNANT NEOPLASM OF RIGHT FEMALE BREAST: ICD-10-CM

## 2017-08-03 PROCEDURE — 77061 BREAST TOMOSYNTHESIS UNI: CPT | Mod: TC,LT

## 2017-08-03 PROCEDURE — 77065 DX MAMMO INCL CAD UNI: CPT | Mod: 26,LT,, | Performed by: RADIOLOGY

## 2017-08-03 PROCEDURE — 77061 BREAST TOMOSYNTHESIS UNI: CPT | Mod: 26,LT,, | Performed by: RADIOLOGY

## 2017-08-15 DIAGNOSIS — B37.31 VAGINAL YEAST INFECTION: ICD-10-CM

## 2017-08-15 DIAGNOSIS — D84.9 IMMUNOSUPPRESSION: ICD-10-CM

## 2017-08-16 RX ORDER — FLUCONAZOLE 150 MG/1
TABLET ORAL
Qty: 2 TABLET | Refills: 2 | OUTPATIENT
Start: 2017-08-16

## 2017-08-21 DIAGNOSIS — M05.79 RHEUMATOID ARTHRITIS INVOLVING MULTIPLE SITES WITH POSITIVE RHEUMATOID FACTOR: Chronic | ICD-10-CM

## 2017-08-21 RX ORDER — METHOTREXATE 2.5 MG/1
TABLET ORAL
Qty: 24 TABLET | Refills: 0 | Status: SHIPPED | OUTPATIENT
Start: 2017-08-21 | End: 2017-09-13 | Stop reason: SDUPTHER

## 2017-09-11 ENCOUNTER — LAB VISIT (OUTPATIENT)
Dept: LAB | Facility: HOSPITAL | Age: 57
End: 2017-09-11
Attending: INTERNAL MEDICINE
Payer: COMMERCIAL

## 2017-09-11 DIAGNOSIS — M05.79 RHEUMATOID ARTHRITIS INVOLVING MULTIPLE SITES WITH POSITIVE RHEUMATOID FACTOR: Chronic | ICD-10-CM

## 2017-09-11 DIAGNOSIS — Z79.899 HIGH RISK MEDICATION USE: ICD-10-CM

## 2017-09-11 LAB
ALBUMIN SERPL BCP-MCNC: 3.9 G/DL
ALP SERPL-CCNC: 90 U/L
ALT SERPL W/O P-5'-P-CCNC: 52 U/L
ANION GAP SERPL CALC-SCNC: 9 MMOL/L
AST SERPL-CCNC: 33 U/L
BASOPHILS # BLD AUTO: 0.04 K/UL
BASOPHILS NFR BLD: 1 %
BILIRUB SERPL-MCNC: 0.4 MG/DL
BUN SERPL-MCNC: 10 MG/DL
CALCIUM SERPL-MCNC: 9.3 MG/DL
CCP AB SER IA-ACNC: 49.3 U/ML
CHLORIDE SERPL-SCNC: 106 MMOL/L
CO2 SERPL-SCNC: 28 MMOL/L
CREAT SERPL-MCNC: 0.8 MG/DL
CRP SERPL-MCNC: 7.2 MG/L
DIFFERENTIAL METHOD: ABNORMAL
EOSINOPHIL # BLD AUTO: 0.1 K/UL
EOSINOPHIL NFR BLD: 3.4 %
ERYTHROCYTE [DISTWIDTH] IN BLOOD BY AUTOMATED COUNT: 13.9 %
ERYTHROCYTE [SEDIMENTATION RATE] IN BLOOD BY WESTERGREN METHOD: 24 MM/HR
EST. GFR  (AFRICAN AMERICAN): >60 ML/MIN/1.73 M^2
EST. GFR  (NON AFRICAN AMERICAN): >60 ML/MIN/1.73 M^2
GLUCOSE SERPL-MCNC: 100 MG/DL
HCT VFR BLD AUTO: 41.3 %
HGB BLD-MCNC: 13.6 G/DL
LYMPHOCYTES # BLD AUTO: 1 K/UL
LYMPHOCYTES NFR BLD: 24.8 %
MCH RBC QN AUTO: 31.3 PG
MCHC RBC AUTO-ENTMCNC: 32.9 G/DL
MCV RBC AUTO: 95 FL
MONOCYTES # BLD AUTO: 0.3 K/UL
MONOCYTES NFR BLD: 7.5 %
NEUTROPHILS # BLD AUTO: 2.6 K/UL
NEUTROPHILS NFR BLD: 63.1 %
PLATELET # BLD AUTO: 265 K/UL
PMV BLD AUTO: 9.5 FL
POTASSIUM SERPL-SCNC: 4.2 MMOL/L
PROT SERPL-MCNC: 7.6 G/DL
RBC # BLD AUTO: 4.34 M/UL
RHEUMATOID FACT SERPL-ACNC: <10 IU/ML
SODIUM SERPL-SCNC: 143 MMOL/L
WBC # BLD AUTO: 4.15 K/UL

## 2017-09-11 PROCEDURE — 36415 COLL VENOUS BLD VENIPUNCTURE: CPT | Mod: PO

## 2017-09-11 PROCEDURE — 86038 ANTINUCLEAR ANTIBODIES: CPT

## 2017-09-11 PROCEDURE — 80053 COMPREHEN METABOLIC PANEL: CPT

## 2017-09-11 PROCEDURE — 86039 ANTINUCLEAR ANTIBODIES (ANA): CPT

## 2017-09-11 PROCEDURE — 86431 RHEUMATOID FACTOR QUANT: CPT

## 2017-09-11 PROCEDURE — 86140 C-REACTIVE PROTEIN: CPT

## 2017-09-11 PROCEDURE — 85025 COMPLETE CBC W/AUTO DIFF WBC: CPT

## 2017-09-11 PROCEDURE — 86235 NUCLEAR ANTIGEN ANTIBODY: CPT | Mod: 59

## 2017-09-11 PROCEDURE — 86200 CCP ANTIBODY: CPT

## 2017-09-11 PROCEDURE — 85651 RBC SED RATE NONAUTOMATED: CPT

## 2017-09-12 LAB
ANA SER QL IF: POSITIVE
ANA TITR SER IF: NORMAL {TITER}

## 2017-09-13 ENCOUNTER — OFFICE VISIT (OUTPATIENT)
Dept: RHEUMATOLOGY | Facility: CLINIC | Age: 57
End: 2017-09-13
Payer: COMMERCIAL

## 2017-09-13 VITALS
WEIGHT: 207.88 LBS | HEIGHT: 68 IN | SYSTOLIC BLOOD PRESSURE: 126 MMHG | HEART RATE: 69 BPM | BODY MASS INDEX: 31.5 KG/M2 | DIASTOLIC BLOOD PRESSURE: 85 MMHG

## 2017-09-13 DIAGNOSIS — B37.31 VAGINAL YEAST INFECTION: ICD-10-CM

## 2017-09-13 DIAGNOSIS — D84.9 IMMUNOSUPPRESSION: ICD-10-CM

## 2017-09-13 DIAGNOSIS — M05.79 RHEUMATOID ARTHRITIS INVOLVING MULTIPLE SITES WITH POSITIVE RHEUMATOID FACTOR: Chronic | ICD-10-CM

## 2017-09-13 DIAGNOSIS — Z79.899 HIGH RISK MEDICATION USE: ICD-10-CM

## 2017-09-13 PROCEDURE — 99214 OFFICE O/P EST MOD 30 MIN: CPT | Mod: S$GLB,,, | Performed by: INTERNAL MEDICINE

## 2017-09-13 PROCEDURE — 99999 PR PBB SHADOW E&M-EST. PATIENT-LVL III: CPT | Mod: PBBFAC,,, | Performed by: INTERNAL MEDICINE

## 2017-09-13 PROCEDURE — 3008F BODY MASS INDEX DOCD: CPT | Mod: S$GLB,,, | Performed by: INTERNAL MEDICINE

## 2017-09-13 RX ORDER — FLUCONAZOLE 150 MG/1
TABLET ORAL
Qty: 2 TABLET | Refills: 2 | Status: SHIPPED | OUTPATIENT
Start: 2017-09-13 | End: 2017-12-13 | Stop reason: SDUPTHER

## 2017-09-13 RX ORDER — FOLIC ACID 1 MG/1
1 TABLET ORAL DAILY
Qty: 90 TABLET | Refills: 3 | Status: SHIPPED | OUTPATIENT
Start: 2017-09-13 | End: 2017-09-13 | Stop reason: SDUPTHER

## 2017-09-13 RX ORDER — FOLIC ACID 1 MG/1
1 TABLET ORAL DAILY
Qty: 90 TABLET | Refills: 3 | Status: SHIPPED | OUTPATIENT
Start: 2017-09-13 | End: 2018-10-28 | Stop reason: SDUPTHER

## 2017-09-13 RX ORDER — HYDROXYCHLOROQUINE SULFATE 200 MG/1
200 TABLET, FILM COATED ORAL 2 TIMES DAILY
Qty: 180 TABLET | Refills: 1 | Status: SHIPPED | OUTPATIENT
Start: 2017-09-13 | End: 2018-03-12

## 2017-09-13 RX ORDER — METHOTREXATE 2.5 MG/1
15 TABLET ORAL
Qty: 78 TABLET | Refills: 0 | Status: SHIPPED | OUTPATIENT
Start: 2017-09-13 | End: 2017-12-13 | Stop reason: SDUPTHER

## 2017-09-13 RX ORDER — METHOTREXATE 2.5 MG/1
15 TABLET ORAL
Qty: 24 TABLET | Refills: 2 | Status: SHIPPED | OUTPATIENT
Start: 2017-09-13 | End: 2017-09-13 | Stop reason: SDUPTHER

## 2017-09-13 ASSESSMENT — ROUTINE ASSESSMENT OF PATIENT INDEX DATA (RAPID3)
PAIN SCORE: 6
TOTAL RAPID3 SCORE: 4.72
FATIGUE SCORE: 7
PATIENT GLOBAL ASSESSMENT SCORE: 6.5
PSYCHOLOGICAL DISTRESS SCORE: 4.4
AM STIFFNESS SCORE: 1, YES
MDHAQ FUNCTION SCORE: .5
WHEN YOU AWAKENED IN THE MORNING OVER THE LAST WEEK, PLEASE INDICATE THE AMOUNT OF TIME IT TAKES UNTIL YOU ARE AS LIMBER AS YOU WILL BE FOR THE DAY: 1 HOUR

## 2017-09-13 ASSESSMENT — DISEASE ACTIVITY SCORE (DAS28)
TOTAL_SCORE_CRP: 3.81
ESR_MM_PER_HR: 24
TOTAL_SCORE_ESR: 4.32
GLOBAL_HEALTH_SCORE: 65
TENDER_JOINTS_COUNT: 1
CRP_MG_PER_LITER: 7.2
SWOLLEN_JOINTS_COUNT: 5

## 2017-09-13 NOTE — PROGRESS NOTES
"Subjective:       Patient ID: Sherrell Yip is a 57 y.o. female.    Chief Complaint: Disease Management    Former James with [employer] system   Just got approved for SSI      CCP+ RA with positive ANTONIA    MTX since Oct 2015     mg/d since April 2016 -Nov 2016 when switched to LFA  LFA added Nov 2016  Prednisone bridge 1 month, July 2015  NSAID: hx Celebrex; hx ibuprofen caused stomach ache  HCQ since  June 2017    Currently:   bid  MTX 15 mg / week  LFA 10 mg/d  Meloxicam 15 mg some days     Also cymbalta for depression and neck pain from disk and LUE pain after breast surgery  Hx Breast Ca, s/p Femara for 5 years    Maybe less flareups since on HCQ in addition to LFA and MTX  1 hour morning stiffness  R elbow and R wrist and arm pain   Hand stiffness in a.m.  Swelling mostly R hand and wrist  Shoulders ok  Knees/ankles/feet ok; knees always painful when climbs stairs     Off of work since October 12  Now on SS disability and under 's insurance    Still avoids heavy lifting    Nausea and dizziness for a few seconds when bends or emerson or lies down; started over the summer; comes and goes; no ear ringing    Sinus infection x 2 weeks ; taking mucinex and lost voice  No chest congestion      Review of Systems   Constitutional: Positive for fatigue, fever and unexpected weight change.        Will feel fever when joints flare   HENT: Positive for mouth sores. Negative for trouble swallowing.         Dry mouth   Eyes: Negative for redness.        Dry eyes   Respiratory: Negative for cough and shortness of breath.    Cardiovascular: Negative for chest pain.   Gastrointestinal: Positive for diarrhea. Negative for abdominal pain and constipation.   Skin: Negative for rash.   Neurological: Negative for headaches.   Hematological: Negative for adenopathy. Does not bruise/bleed easily.   Psychiatric/Behavioral: Negative for sleep disturbance.         Objective:   /85   Pulse 69   Ht 5' 8" (1.727 m)  "  Wt 94.3 kg (207 lb 14.4 oz)   BMI 31.61 kg/m²      Physical Exam   Constitutional: She is well-developed, well-nourished, and in no distress.   HENT:   Mouth/Throat: Oropharynx is clear and moist.   Eyes: Conjunctivae are normal.   Cardiovascular: Normal rate and regular rhythm.    Pulmonary/Chest: She has no wheezes. She has no rales.       Right Side Rheumatological Exam     The patient is tender to palpation of the wrist    She has swelling of the wrist, 1st MCP, 2nd MCP and 3rd MCP    Left Side Rheumatological Exam     She has swelling of the wrist      Lymphadenopathy:     She has no cervical adenopathy.   Neurological: She is alert.   Skin: No rash noted.           Assessment:       1. Rheumatoid arthritis involving multiple sites with positive rheumatoid factor    2. Immunosuppression    3. Vaginal yeast infection    4. High risk medication use      RA not optimally controlled but she is afraid to take biologic  Elevated transaminase on LFA      Plan:     1. D/c LFA  2. cont mtx and hcq  3. Lab 3 mo for mtx  4. RTC 4 mo

## 2017-09-14 LAB
ANTI SM ANTIBODY: 0.49 EU
ANTI SM/RNP ANTIBODY: 1.22 EU
ANTI-SM INTERPRETATION: NEGATIVE
ANTI-SM/RNP INTERPRETATION: NEGATIVE
ANTI-SSA ANTIBODY: 2.15 EU
ANTI-SSA INTERPRETATION: NEGATIVE
ANTI-SSB ANTIBODY: 0.75 EU
ANTI-SSB INTERPRETATION: NEGATIVE
DSDNA AB SER-ACNC: NORMAL [IU]/ML

## 2017-12-13 ENCOUNTER — OFFICE VISIT (OUTPATIENT)
Dept: RHEUMATOLOGY | Facility: CLINIC | Age: 57
End: 2017-12-13
Payer: COMMERCIAL

## 2017-12-13 VITALS
SYSTOLIC BLOOD PRESSURE: 116 MMHG | WEIGHT: 215.63 LBS | DIASTOLIC BLOOD PRESSURE: 76 MMHG | HEART RATE: 64 BPM | HEIGHT: 68 IN | BODY MASS INDEX: 32.68 KG/M2

## 2017-12-13 DIAGNOSIS — M05.79 RHEUMATOID ARTHRITIS INVOLVING MULTIPLE SITES WITH POSITIVE RHEUMATOID FACTOR: Chronic | ICD-10-CM

## 2017-12-13 DIAGNOSIS — M05.79 RHEUMATOID ARTHRITIS INVOLVING MULTIPLE SITES WITH POSITIVE RHEUMATOID FACTOR: Primary | Chronic | ICD-10-CM

## 2017-12-13 DIAGNOSIS — Z79.899 HIGH RISK MEDICATION USE: Chronic | ICD-10-CM

## 2017-12-13 DIAGNOSIS — B37.31 VAGINAL YEAST INFECTION: ICD-10-CM

## 2017-12-13 DIAGNOSIS — D84.9 IMMUNOSUPPRESSION: ICD-10-CM

## 2017-12-13 PROCEDURE — 99999 PR PBB SHADOW E&M-EST. PATIENT-LVL III: CPT | Mod: PBBFAC,,,

## 2017-12-13 PROCEDURE — 99214 OFFICE O/P EST MOD 30 MIN: CPT | Mod: S$GLB,,, | Performed by: INTERNAL MEDICINE

## 2017-12-13 RX ORDER — METHOTREXATE 2.5 MG/1
15 TABLET ORAL
Qty: 78 TABLET | Refills: 0 | Status: SHIPPED | OUTPATIENT
Start: 2017-12-13 | End: 2018-04-25 | Stop reason: SDUPTHER

## 2017-12-13 RX ORDER — METHOTREXATE 2.5 MG/1
TABLET ORAL
Qty: 78 TABLET | Refills: 0 | Status: CANCELLED | OUTPATIENT
Start: 2017-12-13

## 2017-12-13 RX ORDER — FLUCONAZOLE 150 MG/1
TABLET ORAL
Qty: 2 TABLET | Refills: 2 | Status: SHIPPED | OUTPATIENT
Start: 2017-12-13 | End: 2018-02-14 | Stop reason: SDUPTHER

## 2017-12-13 ASSESSMENT — ROUTINE ASSESSMENT OF PATIENT INDEX DATA (RAPID3)
AM STIFFNESS SCORE: 1, YES
PSYCHOLOGICAL DISTRESS SCORE: 7.7
PATIENT GLOBAL ASSESSMENT SCORE: 5.5
WHEN YOU AWAKENED IN THE MORNING OVER THE LAST WEEK, PLEASE INDICATE THE AMOUNT OF TIME IT TAKES UNTIL YOU ARE AS LIMBER AS YOU WILL BE FOR THE DAY: 1 HOUR
FATIGUE SCORE: 6.5
TOTAL RAPID3 SCORE: 4.72
PAIN SCORE: 6
MDHAQ FUNCTION SCORE: .8

## 2017-12-13 NOTE — PROGRESS NOTES
"Subjective:       Patient ID: Sherrell Yip is a 57 y.o. female.    Chief Complaint: Disease Management    Former [employer] with [employer] system   Just got approved for SSI      CCP+ RA with positive ANTONIA    MTX since Oct 2015     mg/d since April 2016 -Nov 2016 when switched to LFA  LFA added Nov 2016 then stopped 2017 due to elevated AST/ALT  Prednisone bridge 1 month, July 2015  NSAID: hx Celebrex; hx ibuprofen caused stomach ache  HCQ since  June 2017    Currently:   bid  MTX 15 mg / week  Meloxicam 15 mg some days     Also cymbalta for depression and neck pain from disk and LUE pain after breast surgery  Hx Breast Ca, s/p Femara for 5 years                Associated symptoms include fatigue and headaches. Pertinent negatives include no fever or trouble swallowing.          Increased wrist deformitiy    Mtx 6 tablets once a week, HCQ 200mg BID, mobic prn  feels that cymbalta is helping  Having flare up involving wrist, arms, and elbows, which lasts a few day but meloxicam helps-she thinks the weather is the culprit.  1 Hour morning stiffness, hand and leg swelling, knees painful when climbing stairs,  C/O vertigo when bending down  Still has fatigue  Had mouth ulcers last week, but now gone     Review of Systems   Constitutional: Positive for fatigue. Negative for fever and unexpected weight change.        Will feel fever when joints flare   HENT: Positive for mouth sores. Negative for trouble swallowing.         Dry mouth   Eyes: Negative for redness.        Dry eyes   Respiratory: Negative for cough and shortness of breath.    Cardiovascular: Negative for chest pain.   Gastrointestinal: Negative for abdominal pain, constipation and diarrhea.   Skin: Negative for rash.   Neurological: Positive for headaches.   Hematological: Negative for adenopathy. Does not bruise/bleed easily.   Psychiatric/Behavioral: Negative for sleep disturbance.                 Objective:   /76   Pulse 64   Ht 5' 8" " (1.727 m)   Wt 97.8 kg (215 lb 9.6 oz)   BMI 32.78 kg/m²      Physical Exam   Constitutional: She is well-developed, well-nourished, and in no distress.   HENT:   Mouth/Throat: Oropharynx is clear and moist.   Eyes: Conjunctivae are normal.     Physical Exam     Tenderness:   RUE: wrist  LUE: wrist    Swelling:   RUE: ulnohumeral and radiohumeral    VALDERRAMA-28 tender joint count: 2  VALDERRAMA-28 swollen joint count: 1  ESR (mm/hr): 6  Patient global assessment: 55  VALDERRAMA-28 score: 3.1 (Low Disease Activity)       12/6 [facility] ESR 6, CRP 4.5, CBC, CMP nl  Assessment:       1. Rheumatoid arthritis involving multiple sites with positive rheumatoid factor    2. High risk medication use    3. Immunosuppression    4. Vaginal yeast infection           RA improved disease control / low disease activity on mtx + HCQ   Allergic to SSZ and had Elevated transaminase on LFA  She is afraid to take biologic due to hx breast cancer      Plan:     1. Refill  mtx and hcq  3. [facility] 3 mo for mtx  4. RTC 4 mo

## 2018-02-14 DIAGNOSIS — D84.9 IMMUNOSUPPRESSION: ICD-10-CM

## 2018-02-14 DIAGNOSIS — B37.31 VAGINAL YEAST INFECTION: ICD-10-CM

## 2018-02-14 RX ORDER — FLUCONAZOLE 150 MG/1
TABLET ORAL
Qty: 2 TABLET | Refills: 2 | Status: SHIPPED | OUTPATIENT
Start: 2018-02-14 | End: 2018-03-07

## 2018-02-22 DIAGNOSIS — Z85.3 PERSONAL HISTORY OF MALIGNANT NEOPLASM OF BREAST: ICD-10-CM

## 2018-03-07 ENCOUNTER — INITIAL CONSULT (OUTPATIENT)
Dept: HEMATOLOGY/ONCOLOGY | Facility: CLINIC | Age: 58
End: 2018-03-07
Payer: COMMERCIAL

## 2018-03-07 VITALS
BODY MASS INDEX: 33.31 KG/M2 | OXYGEN SATURATION: 97 % | WEIGHT: 219.81 LBS | SYSTOLIC BLOOD PRESSURE: 134 MMHG | DIASTOLIC BLOOD PRESSURE: 78 MMHG | TEMPERATURE: 99 F | HEIGHT: 68 IN | HEART RATE: 67 BPM

## 2018-03-07 DIAGNOSIS — Z85.3 PERSONAL HISTORY OF MALIGNANT NEOPLASM OF BREAST: Primary | ICD-10-CM

## 2018-03-07 DIAGNOSIS — M81.0 OSTEOPOROSIS, UNSPECIFIED OSTEOPOROSIS TYPE, UNSPECIFIED PATHOLOGICAL FRACTURE PRESENCE: ICD-10-CM

## 2018-03-07 DIAGNOSIS — M05.79 RHEUMATOID ARTHRITIS INVOLVING MULTIPLE SITES WITH POSITIVE RHEUMATOID FACTOR: ICD-10-CM

## 2018-03-07 DIAGNOSIS — E03.9 HYPOTHYROIDISM, UNSPECIFIED TYPE: ICD-10-CM

## 2018-03-07 PROCEDURE — 99213 OFFICE O/P EST LOW 20 MIN: CPT | Mod: S$GLB,,, | Performed by: INTERNAL MEDICINE

## 2018-03-07 PROCEDURE — 99999 PR PBB SHADOW E&M-EST. PATIENT-LVL III: CPT | Mod: PBBFAC,,, | Performed by: INTERNAL MEDICINE

## 2018-03-07 RX ORDER — ALENDRONATE SODIUM 35 MG/1
35 TABLET ORAL WEEKLY
Qty: 12 TABLET | Refills: 3 | Status: SHIPPED | OUTPATIENT
Start: 2018-03-07 | End: 2019-03-07 | Stop reason: SDUPTHER

## 2018-03-07 RX ORDER — LEVOTHYROXINE SODIUM 125 UG/1
TABLET ORAL
Refills: 1 | COMMUNITY
Start: 2018-02-27 | End: 2021-03-17 | Stop reason: SDUPTHER

## 2018-03-07 NOTE — Clinical Note
Request Records from [facility]. [facility]. Labs before next visit

## 2018-03-07 NOTE — PROGRESS NOTES
Chief Complaint :   Breast Cancer.    Hx of Present illness :  Patient is a 58 y.o. year old female who presents to the clinic today for  Oncology followup. Dx'd to have invasive carcinoma involving right breast in . Underwent right mastectomy with reconstruction.. Grade 1, ER +; [place] +, HER- 2 Neg; Node negattive. Stage 1. Low risk of recurrence on Oncotype Dx. Completed five years of Femara therapy. Routine Annual visit. Recently scratched nostril and developed mild cellulitis. Rx with antibiotics.Good appetite. No bowel or urinary Sx. No fever, cough, dyspnea. No headache, dizziness, nausea, vomiting. No tingling or numbness.  Has joint pains related to Rheumatoid arthritis. Has been gaining weight.  has been titrating Thyroid meds. No epistaxis, bleeding gums, hematemesis, hemoptysis, hematuria, ayan or vaginal bleeding. Has dryness of eyes; uses artificial tears.      Allergies :    Review of patient's allergies indicates:   Allergen Reactions    Penicillins     Sulfa (sulfonamide antibiotics)     Flagyl [metronidazole hcl]        Occupation :  Homaker. Retired     Transfusion :  None    Menstrual & obstetric Hx :  3; Para 3.  Age of menarche: 13  Age of first pregnancy: 22  Lactation history:  Used Breast Pump  Age of menopause:  Hysterectomy at age 40 because of Heavy bleeding related to fifroids  HRT: premarin for 8 years    Present Meds :   Medication List with Changes/Refills   Current Medications    ALENDRONATE (FOSAMAX) 35 MG TABLET        DULOXETINE (CYMBALTA) 30 MG CAPSULE    Take 30 mg by mouth nightly.    FOLBIC 2.5-25-2 MG TAB    Take 1 tablet by mouth once daily.    FOLIC ACID (FOLVITE) 1 MG TABLET    Take 1 tablet (1 mg total) by mouth once daily.    FUROSEMIDE (LASIX) 20 MG TABLET        HYDROCHLOROTHIAZIDE (HYDRODIURIL) 25 MG TABLET    Take 25 mg by mouth once daily.    HYDROXYCHLOROQUINE (PLAQUENIL) 200 MG TABLET    Take 1 tablet (200 mg total) by mouth 2  (two) times daily.    MELOXICAM (MOBIC) 15 MG TABLET    Take 1 tablet (15 mg total) by mouth daily as needed for Pain.    METHOTREXATE 2.5 MG TAB    Take 6 tablets (15 mg total) by mouth every 7 days.    METOPROLOL SUCCINATE (TOPROL-XL) 50 MG 24 HR TABLET    Take 50 mg by mouth once daily.    POTASSIUM CHLORIDE (MICRO-K) 10 MEQ CPSR    Take 10 mEq by mouth once daily.    SYNTHROID 125 MCG TABLET    TAKE 1 TABLET BY MOUTH EVERY DAY IN PLACE OF 112MCG    TRIAMCINOLONE ACETONIDE 0.1% (KENALOG) 0.1 % CREAM    Apply topically 2 (two) times daily as needed.   Discontinued Medications    FLUCONAZOLE (DIFLUCAN) 150 MG TAB    TAKE ONE PILL BY MOUTH THEN REPEAT IN 3 DAYS IF NEEDED.    SYNTHROID 112 MCG TABLET    Take 112 mcg by mouth once daily.       Past Medical Hx :  Carcinoma Right Breast; Hypothyroidism, Rheumatoid arthritis, Hypertension. No Hx of PUD, Hepatitis, Liver disease, TB, Sarcoid, Lupus, CVA, seizure disorder, DVT, PE> No past hx of Chemotherapy or radiation therapy    Past Medical Hx :  Past Medical History:   Diagnosis Date    Abnormal Pap smear     Breast CA     Dry mouth     Hypertension     Hypothyroidism     RA (rheumatoid arthritis)        Travel Hx :  N/A    Immunization :  Immunization History   Administered Date(s) Administered    Influenza 11/16/2012, 09/27/2013       Family Hx :  Family History   Problem Relation Age of Onset    Alzheimer's disease Mother     Lupus Father     Parkinsonism Father     Lupus Daughter     Cancer Cousin      lymphoma    Breast cancer Neg Hx     Colon cancer Neg Hx     Ovarian cancer Neg Hx        Social Hx :  Social History     Social History    Marital status:      Spouse name: N/A    Number of children: N/A    Years of education: N/A     Occupational History    Not on file.     Social History Main Topics    Smoking status: Never Smoker    Smokeless tobacco: Never Used    Alcohol use No    Drug use: No    Sexual activity: Not Currently      Other Topics Concern    Not on file     Social History Narrative    No narrative on file       Surgery : [facility] with BSO; Breast Bx; Right mastectomy with recontruction. Partial Thyroidectomy. Colonoscopy    Symptoms :    Review of Systems   Constitutional: Positive for malaise/fatigue. Negative for chills, diaphoresis, fever and weight loss.   HENT: Positive for congestion. Negative for ear discharge, ear pain, hearing loss, nosebleeds, sinus pain, sore throat and tinnitus.    Eyes: Negative for blurred vision, double vision, photophobia, pain, discharge and redness.   Respiratory: Negative for cough, hemoptysis, sputum production, shortness of breath, wheezing and stridor.    Cardiovascular: Negative for chest pain, palpitations, orthopnea, claudication, leg swelling and PND.   Gastrointestinal: Negative for abdominal pain, blood in stool, constipation, diarrhea, heartburn, melena, nausea and vomiting.   Genitourinary: Positive for frequency. Negative for dysuria, flank pain, hematuria and urgency.   Musculoskeletal: Positive for joint pain. Negative for back pain, falls, myalgias and neck pain.   Skin: Negative for itching and rash.   Neurological: Negative for dizziness, tingling, tremors, sensory change, speech change, focal weakness, seizures, loss of consciousness, weakness and headaches.   Endo/Heme/Allergies: Negative for polydipsia. Does not bruise/bleed easily.   Psychiatric/Behavioral: Negative for depression, hallucinations, memory loss, substance abuse and suicidal ideas. The patient is not nervous/anxious and does not have insomnia.        Physical Exam :  M/A Derrion present in the room  Physical Exam   Constitutional: She is oriented to person, place, and time and well-developed, well-nourished, and in no distress. No distress.   HENT:   Head: Normocephalic and atraumatic.   Right Ear: External ear normal.   Left Ear: External ear normal.   Nose: Nose normal.   Mouth/Throat: Oropharynx is clear  and moist.   Eyes: Conjunctivae and EOM are normal. Pupils are equal, round, and reactive to light. Right eye exhibits no discharge. Left eye exhibits no discharge. No scleral icterus.   Neck: Normal range of motion. Neck supple. No JVD present. No tracheal deviation present. No thyromegaly present.       Cardiovascular: Normal rate, regular rhythm, normal heart sounds and intact distal pulses.    Pulmonary/Chest: Effort normal. No stridor. No respiratory distress. She has no wheezes. She has no rales. She exhibits no tenderness.       Abdominal: Soft. Bowel sounds are normal. She exhibits no distension and no mass. There is no tenderness. There is no rebound and no guarding.   Genitourinary:   Genitourinary Comments: Not Examined   Musculoskeletal: Normal range of motion.   Lymphadenopathy:        Head (right side): No submental, no submandibular, no tonsillar, no preauricular, no posterior auricular and no occipital adenopathy present.        Head (left side): No submental, no submandibular, no tonsillar, no preauricular, no posterior auricular and no occipital adenopathy present.     She has no axillary adenopathy.        Right: No inguinal, no supraclavicular and no epitrochlear adenopathy present.        Left: No inguinal, no supraclavicular and no epitrochlear adenopathy present.   Neurological: She is alert and oriented to person, place, and time. She has normal motor skills, normal sensation, normal strength, normal reflexes and intact cranial nerves. Gait normal. GCS score is 15.   Skin: Skin is warm, dry and intact. No rash noted. She is not diaphoretic. No cyanosis. No pallor. Nails show no clubbing.   Psychiatric: Mood, memory, affect and judgment normal.         Labs & Imaging :  02/28/18 : NFBS 90. Cr.0.85 Bili 0.4 ALP 85 Ca 9.7  ; Hgb 14; Hct 41.1.  Plts 325,000 ANC 2,910        Dx :   Remote Hx of Carcinoma Right Breast. Completed five years of Harmonal Therapy. Hypothyroidism. Rheumatoid  arthritis.      Assessment & Plan: reviewed with patient. Does not want to go on Prolia. Continue Fosamax 35 Mg once a week. Oncology followup in one year.      Distress Screening Results: Psychosocial Distress screening score of Distress Score: 2 noted and reviewed. No intervention indicated.

## 2018-04-17 RX ORDER — HYDROXYCHLOROQUINE SULFATE 200 MG/1
TABLET, FILM COATED ORAL
Qty: 180 TABLET | Refills: 1 | Status: SHIPPED | OUTPATIENT
Start: 2018-04-17 | End: 2018-08-20 | Stop reason: SDUPTHER

## 2018-04-25 ENCOUNTER — OFFICE VISIT (OUTPATIENT)
Dept: RHEUMATOLOGY | Facility: CLINIC | Age: 58
End: 2018-04-25
Payer: COMMERCIAL

## 2018-04-25 VITALS
HEART RATE: 77 BPM | BODY MASS INDEX: 33.34 KG/M2 | WEIGHT: 220 LBS | DIASTOLIC BLOOD PRESSURE: 88 MMHG | SYSTOLIC BLOOD PRESSURE: 145 MMHG | HEIGHT: 68 IN

## 2018-04-25 DIAGNOSIS — Z79.899 HIGH RISK MEDICATION USE: Chronic | ICD-10-CM

## 2018-04-25 DIAGNOSIS — R76.8 ANA POSITIVE: ICD-10-CM

## 2018-04-25 DIAGNOSIS — M05.79 RHEUMATOID ARTHRITIS INVOLVING MULTIPLE SITES WITH POSITIVE RHEUMATOID FACTOR: Primary | Chronic | ICD-10-CM

## 2018-04-25 DIAGNOSIS — B37.31 VAGINAL YEAST INFECTION: ICD-10-CM

## 2018-04-25 PROCEDURE — 99999 PR PBB SHADOW E&M-EST. PATIENT-LVL III: CPT | Mod: PBBFAC,,,

## 2018-04-25 PROCEDURE — 99214 OFFICE O/P EST MOD 30 MIN: CPT | Mod: S$GLB,,, | Performed by: INTERNAL MEDICINE

## 2018-04-25 RX ORDER — DICLOFENAC SODIUM 10 MG/G
GEL TOPICAL
COMMUNITY
Start: 2018-04-02

## 2018-04-25 RX ORDER — VALACYCLOVIR HYDROCHLORIDE 1 G/1
TABLET, FILM COATED ORAL
COMMUNITY
Start: 2018-02-19 | End: 2018-07-25

## 2018-04-25 RX ORDER — FLUCONAZOLE 150 MG/1
TABLET ORAL
COMMUNITY
Start: 2018-03-24 | End: 2018-04-25 | Stop reason: SDUPTHER

## 2018-04-25 RX ORDER — MUPIROCIN 20 MG/G
OINTMENT TOPICAL
COMMUNITY
Start: 2018-03-07 | End: 2018-07-25

## 2018-04-25 RX ORDER — METHOTREXATE 2.5 MG/1
15 TABLET ORAL
Qty: 78 TABLET | Refills: 0 | Status: SHIPPED | OUTPATIENT
Start: 2018-04-25 | End: 2018-07-09

## 2018-04-25 RX ORDER — FLUCONAZOLE 150 MG/1
150 TABLET ORAL DAILY
Qty: 10 TABLET | Refills: 1 | Status: ON HOLD | OUTPATIENT
Start: 2018-04-25 | End: 2018-07-30 | Stop reason: HOSPADM

## 2018-04-25 ASSESSMENT — ROUTINE ASSESSMENT OF PATIENT INDEX DATA (RAPID3)
PAIN SCORE: 5.5
TOTAL RAPID3 SCORE: 4.55
PATIENT GLOBAL ASSESSMENT SCORE: 5.5
WHEN YOU AWAKENED IN THE MORNING OVER THE LAST WEEK, PLEASE INDICATE THE AMOUNT OF TIME IT TAKES UNTIL YOU ARE AS LIMBER AS YOU WILL BE FOR THE DAY: 1 HOUR
MDHAQ FUNCTION SCORE: .8
AM STIFFNESS SCORE: 1, YES
PSYCHOLOGICAL DISTRESS SCORE: 6.6
FATIGUE SCORE: 7

## 2018-04-25 NOTE — PROGRESS NOTES
Subjective:       Patient ID: Sherrell Yip is a 58 y.o. female.    Chief Complaint: Disease Management    Former [employer] with [employer] system   Just got approved for SSI      CCP+ RA with positive ANTONIA    MTX since Oct 2015     mg/d since April 2016 -Nov 2016 when switched to LFA  LFA added Nov 2016 then stopped 2017 due to elevated AST/ALT  Prednisone bridge 1 month, July 2015  NSAID: hx Celebrex; hx ibuprofen caused stomach ache  HCQ since  June 2017    Currently:   bid  MTX 15 mg / week and 1mg folic acid everyday  Meloxicam 15 mg when needed     Also cymbalta for depression and neck pain from disk and LUE pain after breast surgery  Hx Breast Ca, s/p Femara for 5 years    Thinks cymbalta is causing her to gain weight.  Has gained 5# since last visit.    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 Bobby Parish.  Nose healing very well.                    Associated symptoms include fatigue and headaches. Pertinent negatives include no fever or trouble swallowing.          Increased wrist deformitiy to right wrist , worst than left    Mtx 6 tablets once a week, HCQ 200mg BID, mobic prn  feels that cymbalta is helping, but thinks causing weight gain   flares involving wrist, arms, and elbows-not as bad, which lasts a few day but meloxicam helps- last flare about 1 week ago  1 Hour morning stiffness,, knees painful when climbing stairs,  Vertigo much better  Still has fatigue and feels sleepy a lot, but has insomnia at night      Review of Systems   Constitutional: Positive for fatigue. Negative for fever and unexpected weight change.   HENT: Positive for mouth sores. Negative for trouble swallowing.         Dry mouth   Eyes: Negative for redness.        Dry eyes   Respiratory: Negative for cough and shortness of breath.    Cardiovascular: Negative for chest pain.   Gastrointestinal: Negative for abdominal pain, constipation and diarrhea.   Skin: Negative for rash.  "  Neurological: Positive for headaches.   Hematological: Negative for adenopathy. Does not bruise/bleed easily.   Psychiatric/Behavioral: Negative for sleep disturbance.         Objective:   BP (!) 145/88   Pulse 77   Ht 5' 8" (1.727 m)   Wt 99.8 kg (220 lb)   BMI 33.45 kg/m²      Physical Exam   Constitutional: She is well-developed, well-nourished, and in no distress.   HENT:   Well healed skin on L nose   Eyes: Conjunctivae are normal.     Physical Exam     VALDERRAMA-28 tender joint count: 0  VALDERRAMA-28 swollen joint count: 0  ESR (mm/hr): 9  Patient global assessment: 55  VALDERRAMA-28 score: 2.31 (Remission)       Lab 3/9/18: nl CMP, CBC, ESR 9  Assessment:       1. Rheumatoid arthritis involving multiple sites with positive rheumatoid factor    2. ANTONIA positive:     3. High risk medication use           RA  low disease activity on mtx + HCQ   Allergic to SSZ and had Elevated transaminase on LFA  She is afraid to take biologic due to hx breast cancer    Plan:     1. Refill  mtx and diflucan   2.  Labs in June and September    3.  rtc 4 months  "

## 2018-05-01 DIAGNOSIS — M05.79 RHEUMATOID ARTHRITIS INVOLVING MULTIPLE SITES WITH POSITIVE RHEUMATOID FACTOR: Chronic | ICD-10-CM

## 2018-05-02 RX ORDER — METHOTREXATE 2.5 MG/1
TABLET ORAL
Qty: 78 TABLET | Refills: 0 | OUTPATIENT
Start: 2018-05-02

## 2018-06-15 LAB
ALBUMIN SERPL-MCNC: 4.6 G/DL (ref 3.6–5.1)
ALBUMIN/GLOB SERPL: 1.8 (CALC) (ref 1–2.5)
ALP SERPL-CCNC: 72 U/L (ref 33–130)
ALT SERPL-CCNC: 28 U/L (ref 6–29)
AST SERPL-CCNC: 23 U/L (ref 10–35)
BASOPHILS # BLD AUTO: 68 CELLS/UL (ref 0–200)
BASOPHILS NFR BLD AUTO: 1.1 %
BILIRUB SERPL-MCNC: 0.4 MG/DL (ref 0.2–1.2)
BUN SERPL-MCNC: 11 MG/DL (ref 7–25)
BUN/CREAT SERPL: NORMAL (CALC) (ref 6–22)
CALCIUM SERPL-MCNC: 9.4 MG/DL (ref 8.6–10.4)
CHLORIDE SERPL-SCNC: 102 MMOL/L (ref 98–110)
CO2 SERPL-SCNC: 28 MMOL/L (ref 20–31)
CREAT SERPL-MCNC: 0.79 MG/DL (ref 0.5–1.05)
CRP SERPL-MCNC: 3.5 MG/L
EOSINOPHIL # BLD AUTO: 112 CELLS/UL (ref 15–500)
EOSINOPHIL NFR BLD AUTO: 1.8 %
ERYTHROCYTE [DISTWIDTH] IN BLOOD BY AUTOMATED COUNT: 13.6 % (ref 11–15)
ERYTHROCYTE [SEDIMENTATION RATE] IN BLOOD BY WESTERGREN METHOD: 5 MM/H
GFR SERPL CREATININE-BSD FRML MDRD: 83 ML/MIN/1.73M2
GLOBULIN SER CALC-MCNC: 2.5 G/DL (CALC) (ref 1.9–3.7)
GLUCOSE SERPL-MCNC: 82 MG/DL (ref 65–139)
HCT VFR BLD AUTO: 37.5 % (ref 35–45)
HGB BLD-MCNC: 13.1 G/DL (ref 11.7–15.5)
LYMPHOCYTES # BLD AUTO: 1445 CELLS/UL (ref 850–3900)
LYMPHOCYTES NFR BLD AUTO: 23.3 %
MCH RBC QN AUTO: 32.4 PG (ref 27–33)
MCHC RBC AUTO-ENTMCNC: 34.9 G/DL (ref 32–36)
MCV RBC AUTO: 92.8 FL (ref 80–100)
MONOCYTES # BLD AUTO: 601 CELLS/UL (ref 200–950)
MONOCYTES NFR BLD AUTO: 9.7 %
NEUTROPHILS # BLD AUTO: 3974 CELLS/UL (ref 1500–7800)
NEUTROPHILS NFR BLD AUTO: 64.1 %
PLATELET # BLD AUTO: 276 THOUSAND/UL (ref 140–400)
PMV BLD REES-ECKER: 9.5 FL (ref 7.5–12.5)
POTASSIUM SERPL-SCNC: 4.4 MMOL/L (ref 3.5–5.3)
PROT SERPL-MCNC: 7.1 G/DL (ref 6.1–8.1)
RBC # BLD AUTO: 4.04 MILLION/UL (ref 3.8–5.1)
SODIUM SERPL-SCNC: 138 MMOL/L (ref 135–146)
WBC # BLD AUTO: 6.2 THOUSAND/UL (ref 3.8–10.8)

## 2018-07-01 DIAGNOSIS — M05.79 RHEUMATOID ARTHRITIS INVOLVING MULTIPLE SITES WITH POSITIVE RHEUMATOID FACTOR: Chronic | ICD-10-CM

## 2018-07-01 RX ORDER — MELOXICAM 15 MG/1
TABLET ORAL
Qty: 90 TABLET | Refills: 3 | Status: SHIPPED | OUTPATIENT
Start: 2018-07-01 | End: 2019-04-29 | Stop reason: SDUPTHER

## 2018-07-08 DIAGNOSIS — M05.79 RHEUMATOID ARTHRITIS INVOLVING MULTIPLE SITES WITH POSITIVE RHEUMATOID FACTOR: Chronic | ICD-10-CM

## 2018-07-09 RX ORDER — METHOTREXATE 2.5 MG/1
TABLET ORAL
Qty: 24 TABLET | Refills: 0 | Status: SHIPPED | OUTPATIENT
Start: 2018-07-09 | End: 2018-08-20

## 2018-07-11 ENCOUNTER — TELEPHONE (OUTPATIENT)
Dept: RHEUMATOLOGY | Facility: CLINIC | Age: 58
End: 2018-07-11

## 2018-07-11 NOTE — TELEPHONE ENCOUNTER
----- Message from Flory Bridges sent at 7/11/2018  1:59 PM CDT -----  Contact: self  Patient Requesting Sooner Appointment.     Reason for sooner appt.: f/u  When is the first available appointment?No solution found between 7/11/2018 and 9/10/2018.  Communication Preference:@ home number  Additional Information:

## 2018-07-13 DIAGNOSIS — C50.911 MALIGNANT NEOPLASM OF RIGHT FEMALE BREAST: Primary | ICD-10-CM

## 2018-07-25 ENCOUNTER — HOSPITAL ENCOUNTER (OUTPATIENT)
Dept: PREADMISSION TESTING | Facility: HOSPITAL | Age: 58
Discharge: HOME | End: 2018-07-25
Attending: OBSTETRICS & GYNECOLOGY
Payer: COMMERCIAL

## 2018-07-25 VITALS
DIASTOLIC BLOOD PRESSURE: 85 MMHG | TEMPERATURE: 98 F | OXYGEN SATURATION: 99 % | BODY MASS INDEX: 33.19 KG/M2 | RESPIRATION RATE: 18 BRPM | SYSTOLIC BLOOD PRESSURE: 148 MMHG | HEART RATE: 62 BPM | WEIGHT: 219 LBS | HEIGHT: 68 IN

## 2018-07-25 DIAGNOSIS — Z01.818 PRE-OP TESTING: Primary | ICD-10-CM

## 2018-07-25 PROBLEM — N89.1: Status: ACTIVE | Noted: 2018-07-25

## 2018-07-25 LAB
ANION GAP SERPL CALC-SCNC: 9 MMOL/L
BASOPHILS # BLD AUTO: 0.04 K/UL
BASOPHILS NFR BLD: 1 %
BUN SERPL-MCNC: 9 MG/DL
CALCIUM SERPL-MCNC: 10 MG/DL
CHLORIDE SERPL-SCNC: 104 MMOL/L
CO2 SERPL-SCNC: 27 MMOL/L
CREAT SERPL-MCNC: 0.8 MG/DL
DIFFERENTIAL METHOD: ABNORMAL
EOSINOPHIL # BLD AUTO: 0.1 K/UL
EOSINOPHIL NFR BLD: 2.4 %
ERYTHROCYTE [DISTWIDTH] IN BLOOD BY AUTOMATED COUNT: 13.4 %
EST. GFR  (AFRICAN AMERICAN): >60 ML/MIN/1.73 M^2
EST. GFR  (NON AFRICAN AMERICAN): >60 ML/MIN/1.73 M^2
GLUCOSE SERPL-MCNC: 93 MG/DL
HCT VFR BLD AUTO: 38.9 %
HGB BLD-MCNC: 13.2 G/DL
LYMPHOCYTES # BLD AUTO: 1.3 K/UL
LYMPHOCYTES NFR BLD: 30.7 %
MCH RBC QN AUTO: 31.7 PG
MCHC RBC AUTO-ENTMCNC: 33.9 G/DL
MCV RBC AUTO: 94 FL
MONOCYTES # BLD AUTO: 0.5 K/UL
MONOCYTES NFR BLD: 11.5 %
NEUTROPHILS # BLD AUTO: 2.2 K/UL
NEUTROPHILS NFR BLD: 54.2 %
PLATELET # BLD AUTO: 275 K/UL
PMV BLD AUTO: 9.2 FL
POTASSIUM SERPL-SCNC: 4.3 MMOL/L
RBC # BLD AUTO: 4.16 M/UL
SODIUM SERPL-SCNC: 140 MMOL/L
WBC # BLD AUTO: 4.1 K/UL

## 2018-07-25 PROCEDURE — 93005 ELECTROCARDIOGRAM TRACING: CPT

## 2018-07-25 PROCEDURE — 85025 COMPLETE CBC W/AUTO DIFF WBC: CPT

## 2018-07-25 PROCEDURE — 80048 BASIC METABOLIC PNL TOTAL CA: CPT

## 2018-07-25 PROCEDURE — 93010 ELECTROCARDIOGRAM REPORT: CPT | Mod: ,,, | Performed by: INTERNAL MEDICINE

## 2018-07-25 NOTE — DISCHARGE INSTRUCTIONS
Your surgery is scheduled for____7/30/2018_____________.    Call [phone] between 2 pm and 5 pm ____7/27/2018________ to find out your arrival time for the day of surgery.    Report to SAME DAY SURGERY UNIT at _______am on the 2nd floor of the hospital.  Use the front entrance of the hospital.  The front doors of the hospital open promptly at 5:30 am.    If you need wheelchair assistance, call [phone] from your cell phone,  or call 0 from the courtesy phone in the hospital lobby.    Important instructions:   Do not eat or drink after 12 midnight, including water.  It is okay to brush your teeth.  Do not have gum, candy or mints.     Take only these medications with a small swallow of water on the morning of your surgery.___synthroid__________      Stop taking Aspirin, Ibuprofen, Motrin and Aleve , Fish oil, and Vitamin E for at least 7 days before your surgery. You may use Tylenol unless otherwise instructed by your doctor.         Please shower the night before and the morning of your surgery.         Do not wear make- up, including mascara.     You may wear deodorant only.      Do not wear powder, body lotion or cologne.     Do not wear any jewelry or have any metal on your body.     Please bring any documents given to you by your doctor.     If you are going home on the same day of surgery, you must arrange for a family member or a friend to drive you home.  Public transportation is prohibited.    You will not be able to drive home if you were given anesthesia or sedation.     Wear loose fitting clothes allowing for bandages.     Please leave money and valuables home.       You may bring your cell phone.     Call the doctor if fever or illness should occur before your surgery.    Call [phone] to contact us here at [facility] if needed.

## 2018-07-29 ENCOUNTER — ANESTHESIA EVENT (OUTPATIENT)
Dept: SURGERY | Facility: HOSPITAL | Age: 58
End: 2018-07-29
Payer: COMMERCIAL

## 2018-07-30 ENCOUNTER — HOSPITAL ENCOUNTER (OUTPATIENT)
Facility: HOSPITAL | Age: 58
Discharge: HOME | End: 2018-07-30
Attending: OBSTETRICS & GYNECOLOGY | Admitting: OBSTETRICS & GYNECOLOGY
Payer: COMMERCIAL

## 2018-07-30 ENCOUNTER — ANESTHESIA (OUTPATIENT)
Dept: SURGERY | Facility: HOSPITAL | Age: 58
End: 2018-07-30
Payer: COMMERCIAL

## 2018-07-30 ENCOUNTER — SURGERY (OUTPATIENT)
Age: 58
End: 2018-07-30

## 2018-07-30 VITALS
SYSTOLIC BLOOD PRESSURE: 127 MMHG | HEART RATE: 55 BPM | BODY MASS INDEX: 33.19 KG/M2 | WEIGHT: 219 LBS | HEIGHT: 68 IN | RESPIRATION RATE: 16 BRPM | DIASTOLIC BLOOD PRESSURE: 67 MMHG | TEMPERATURE: 98 F | OXYGEN SATURATION: 98 %

## 2018-07-30 DIAGNOSIS — N89.1: ICD-10-CM

## 2018-07-30 PROCEDURE — 63600175 PHARM REV CODE 636 W HCPCS: Performed by: NURSE ANESTHETIST, CERTIFIED REGISTERED

## 2018-07-30 PROCEDURE — 71000016 HC POSTOP RECOV ADDL HR: Performed by: OBSTETRICS & GYNECOLOGY

## 2018-07-30 PROCEDURE — D9220A PRA ANESTHESIA: Mod: ANES,,, | Performed by: ANESTHESIOLOGY

## 2018-07-30 PROCEDURE — 27200651 HC AIRWAY, LMA: Performed by: NURSE ANESTHETIST, CERTIFIED REGISTERED

## 2018-07-30 PROCEDURE — 36000707: Performed by: OBSTETRICS & GYNECOLOGY

## 2018-07-30 PROCEDURE — 71000033 HC RECOVERY, INTIAL HOUR: Performed by: OBSTETRICS & GYNECOLOGY

## 2018-07-30 PROCEDURE — 37000009 HC ANESTHESIA EA ADD 15 MINS: Performed by: OBSTETRICS & GYNECOLOGY

## 2018-07-30 PROCEDURE — 36000706: Performed by: OBSTETRICS & GYNECOLOGY

## 2018-07-30 PROCEDURE — 37000008 HC ANESTHESIA 1ST 15 MINUTES: Performed by: OBSTETRICS & GYNECOLOGY

## 2018-07-30 PROCEDURE — 25000003 PHARM REV CODE 250: Performed by: OBSTETRICS & GYNECOLOGY

## 2018-07-30 PROCEDURE — D9220A PRA ANESTHESIA: Mod: CRNA,,, | Performed by: NURSE ANESTHETIST, CERTIFIED REGISTERED

## 2018-07-30 PROCEDURE — 71000015 HC POSTOP RECOV 1ST HR: Performed by: OBSTETRICS & GYNECOLOGY

## 2018-07-30 PROCEDURE — 25000003 PHARM REV CODE 250: Performed by: ANESTHESIOLOGY

## 2018-07-30 RX ORDER — DIPHENHYDRAMINE HYDROCHLORIDE 50 MG/ML
25 INJECTION INTRAMUSCULAR; INTRAVENOUS EVERY 4 HOURS PRN
Status: DISCONTINUED | OUTPATIENT
Start: 2018-07-30 | End: 2018-07-30 | Stop reason: HOSPADM

## 2018-07-30 RX ORDER — LIDOCAINE HCL/PF 100 MG/5ML
SYRINGE (ML) INTRAVENOUS
Status: DISCONTINUED | OUTPATIENT
Start: 2018-07-30 | End: 2018-07-30

## 2018-07-30 RX ORDER — METOCLOPRAMIDE HYDROCHLORIDE 5 MG/ML
INJECTION INTRAMUSCULAR; INTRAVENOUS
Status: DISCONTINUED | OUTPATIENT
Start: 2018-07-30 | End: 2018-07-30

## 2018-07-30 RX ORDER — HYDROCODONE BITARTRATE AND ACETAMINOPHEN 5; 325 MG/1; MG/1
1 TABLET ORAL EVERY 4 HOURS PRN
Qty: 14 TABLET | Refills: 0 | Status: SHIPPED | OUTPATIENT
Start: 2018-07-30 | End: 2018-08-20

## 2018-07-30 RX ORDER — HYDROMORPHONE HYDROCHLORIDE 2 MG/ML
0.1 INJECTION, SOLUTION INTRAMUSCULAR; INTRAVENOUS; SUBCUTANEOUS EVERY 5 MIN PRN
Status: DISCONTINUED | OUTPATIENT
Start: 2018-07-30 | End: 2018-07-30 | Stop reason: HOSPADM

## 2018-07-30 RX ORDER — HYDROMORPHONE HYDROCHLORIDE 2 MG/ML
1 INJECTION, SOLUTION INTRAMUSCULAR; INTRAVENOUS; SUBCUTANEOUS EVERY 4 HOURS PRN
Status: DISCONTINUED | OUTPATIENT
Start: 2018-07-30 | End: 2018-07-30 | Stop reason: HOSPADM

## 2018-07-30 RX ORDER — FENTANYL CITRATE 50 UG/ML
15 INJECTION, SOLUTION INTRAMUSCULAR; INTRAVENOUS EVERY 5 MIN PRN
Status: DISCONTINUED | OUTPATIENT
Start: 2018-07-30 | End: 2018-07-30 | Stop reason: HOSPADM

## 2018-07-30 RX ORDER — ACETAMINOPHEN 10 MG/ML
INJECTION, SOLUTION INTRAVENOUS
Status: DISCONTINUED | OUTPATIENT
Start: 2018-07-30 | End: 2018-07-30

## 2018-07-30 RX ORDER — ONDANSETRON 4 MG/1
8 TABLET, ORALLY DISINTEGRATING ORAL EVERY 8 HOURS PRN
Status: DISCONTINUED | OUTPATIENT
Start: 2018-07-30 | End: 2018-07-30 | Stop reason: HOSPADM

## 2018-07-30 RX ORDER — HYDROCODONE BITARTRATE AND ACETAMINOPHEN 5; 325 MG/1; MG/1
1 TABLET ORAL EVERY 4 HOURS PRN
Status: DISCONTINUED | OUTPATIENT
Start: 2018-07-30 | End: 2018-07-30 | Stop reason: HOSPADM

## 2018-07-30 RX ORDER — SODIUM CHLORIDE 0.9 % (FLUSH) 0.9 %
3 SYRINGE (ML) INJECTION
Status: DISCONTINUED | OUTPATIENT
Start: 2018-07-30 | End: 2018-07-30 | Stop reason: HOSPADM

## 2018-07-30 RX ORDER — ONDANSETRON 2 MG/ML
INJECTION INTRAMUSCULAR; INTRAVENOUS
Status: DISCONTINUED | OUTPATIENT
Start: 2018-07-30 | End: 2018-07-30

## 2018-07-30 RX ORDER — LIDOCAINE HYDROCHLORIDE 10 MG/ML
0.5 INJECTION, SOLUTION EPIDURAL; INFILTRATION; INTRACAUDAL; PERINEURAL ONCE
Status: DISCONTINUED | OUTPATIENT
Start: 2018-07-30 | End: 2018-07-30 | Stop reason: HOSPADM

## 2018-07-30 RX ORDER — MIDAZOLAM HYDROCHLORIDE 1 MG/ML
INJECTION, SOLUTION INTRAMUSCULAR; INTRAVENOUS
Status: DISCONTINUED | OUTPATIENT
Start: 2018-07-30 | End: 2018-07-30

## 2018-07-30 RX ORDER — PROPOFOL 10 MG/ML
VIAL (ML) INTRAVENOUS CONTINUOUS PRN
Status: DISCONTINUED | OUTPATIENT
Start: 2018-07-30 | End: 2018-07-30

## 2018-07-30 RX ORDER — PROPOFOL 10 MG/ML
VIAL (ML) INTRAVENOUS
Status: DISCONTINUED | OUTPATIENT
Start: 2018-07-30 | End: 2018-07-30

## 2018-07-30 RX ORDER — IBUPROFEN 600 MG/1
600 TABLET ORAL EVERY 6 HOURS PRN
Status: DISCONTINUED | OUTPATIENT
Start: 2018-07-30 | End: 2018-07-30 | Stop reason: HOSPADM

## 2018-07-30 RX ORDER — SODIUM CHLORIDE, SODIUM LACTATE, POTASSIUM CHLORIDE, CALCIUM CHLORIDE 600; 310; 30; 20 MG/100ML; MG/100ML; MG/100ML; MG/100ML
INJECTION, SOLUTION INTRAVENOUS CONTINUOUS
Status: DISCONTINUED | OUTPATIENT
Start: 2018-07-30 | End: 2018-07-30 | Stop reason: HOSPADM

## 2018-07-30 RX ADMIN — ACETAMINOPHEN 1000 MG: 10 INJECTION, SOLUTION INTRAVENOUS at 10:07

## 2018-07-30 RX ADMIN — LIDOCAINE HYDROCHLORIDE 100 MG: 20 INJECTION, SOLUTION INTRAVENOUS at 10:07

## 2018-07-30 RX ADMIN — METOCLOPRAMIDE 10 MG: 5 INJECTION, SOLUTION INTRAMUSCULAR; INTRAVENOUS at 10:07

## 2018-07-30 RX ADMIN — PROPOFOL 150 MG: 10 INJECTION, EMULSION INTRAVENOUS at 10:07

## 2018-07-30 RX ADMIN — MIDAZOLAM HYDROCHLORIDE 2 MG: 1 INJECTION, SOLUTION INTRAMUSCULAR; INTRAVENOUS at 10:07

## 2018-07-30 RX ADMIN — IBUPROFEN 600 MG: 600 TABLET, FILM COATED ORAL at 12:07

## 2018-07-30 RX ADMIN — SODIUM CHLORIDE, SODIUM LACTATE, POTASSIUM CHLORIDE, AND CALCIUM CHLORIDE: .6; .31; .03; .02 INJECTION, SOLUTION INTRAVENOUS at 07:07

## 2018-07-30 RX ADMIN — PROPOFOL 150 MCG/KG/MIN: 10 INJECTION, EMULSION INTRAVENOUS at 10:07

## 2018-07-30 RX ADMIN — ONDANSETRON 4 MG: 2 INJECTION, SOLUTION INTRAMUSCULAR; INTRAVENOUS at 11:07

## 2018-07-30 NOTE — ANESTHESIA POSTPROCEDURE EVALUATION
"Anesthesia Post Evaluation    Patient: Sherrell Yip    Procedure(s) Performed: Procedure(s) (LRB):  LASER TO VAGINAL CUFF THRU VAGINAL AREA (N/A)    Final Anesthesia Type: general  Patient location during evaluation: PACU  Patient participation: Yes- Able to Participate  Level of consciousness: awake and alert and oriented  Post-procedure vital signs: reviewed and stable  Pain management: adequate  Airway patency: patent  PONV status at discharge: No PONV  Anesthetic complications: no      Cardiovascular status: blood pressure returned to baseline and hemodynamically stable  Respiratory status: unassisted and spontaneous ventilation  Hydration status: euvolemic  Follow-up not needed.        Visit Vitals  /67 (BP Location: Left arm, Patient Position: Lying)   Pulse (!) 55   Temp 36.4 °C (97.6 °F) (Oral)   Resp 16   Ht 5' 7.5" (1.715 m)   Wt 99.3 kg (219 lb)   SpO2 98%   BMI 33.79 kg/m²       Pain/Trista Score: Pain Assessment Performed: Yes (7/30/2018 12:15 PM)  Presence of Pain: complains of pain/discomfort (7/30/2018 12:15 PM)  Pain Rating Prior to Med Admin: 8 (7/30/2018 12:44 PM)  Pain Rating Post Med Admin: 2 (7/30/2018  1:30 PM)  Trista Score: 10 (7/30/2018 12:05 PM)  Modified Trista Score: 19 (7/30/2018 12:15 PM)      "

## 2018-07-30 NOTE — H&P (VIEW-ONLY)
58 y.o.  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  No LMP recorded. Patient has had a hysterectomy..  bs showed high grade lesion    Past Medical History:   Diagnosis Date    Abnormal Pap smear     Breast CA     Dry mouth     Hypertension     Hypothyroidism     RA (rheumatoid arthritis)      Past Surgical History:   Procedure Laterality Date    BREAST BIOPSY      HYSTERECTOMY      MASTECTOMY  2010    right breast    OOPHORECTOMY      CT REMOVAL OF OVARY/TUBE(S)      THYROID LOBECTOMY      left-benign     Family History   Problem Relation Age of Onset    Alzheimer's disease Mother     Lupus Father     Parkinsonism Father     Lupus Daughter     Cancer Cousin         lymphoma    Breast cancer Neg Hx     Colon cancer Neg Hx     Ovarian cancer Neg Hx      Review of patient's allergies indicates:   Allergen Reactions    Penicillins     Sulfa (sulfonamide antibiotics)     Flagyl [metronidazole hcl]        Current Outpatient Prescriptions:     alendronate (FOSAMAX) 35 MG tablet, Take 1 tablet (35 mg total) by mouth once a week., Disp: 12 tablet, Rfl: 3    clindamycin phosphate vaginal cream, Apply topically 2 (two) times daily., Disp: 5 g, Rfl: 0    diclofenac sodium 1 % Gel, , Disp: , Rfl:     duloxetine (CYMBALTA) 30 MG capsule, Take 30 mg by mouth nightly., Disp: , Rfl: 9    fluconazole (DIFLUCAN) 150 MG Tab, Take 1 tablet (150 mg total) by mouth once daily., Disp: 10 tablet, Rfl: 1    FOLBIC 2.5-25-2 mg Tab, Take 1 tablet by mouth once daily., Disp: 90 tablet, Rfl: 3    folic acid (FOLVITE) 1 MG tablet, Take 1 tablet (1 mg total) by mouth once daily., Disp: 90 tablet, Rfl: 3    furosemide (LASIX) 20 MG tablet, , Disp: , Rfl: 3    hydrochlorothiazide (HYDRODIURIL) 25 MG tablet, Take 25 mg by mouth once daily., Disp: , Rfl:     hydroxychloroquine (PLAQUENIL) 200 mg tablet, TAKE 1 TABLET TWICE A DAY, Disp: 180 tablet, Rfl: 1    meloxicam (MOBIC)  15 MG tablet, TAKE 1 TABLET DAILY AS NEEDED FOR PAIN, Disp: 90 tablet, Rfl: 3    methotrexate 2.5 MG Tab, TAKE 6 TABLETS EVERY 7 DAYS, Disp: 24 tablet, Rfl: 0    metoprolol succinate (TOPROL-XL) 50 MG 24 hr tablet, Take 50 mg by mouth once daily., Disp: , Rfl: 3    mupirocin (BACTROBAN) 2 % ointment, , Disp: , Rfl:     potassium chloride (MICRO-K) 10 MEQ CpSR, Take 10 mEq by mouth once daily., Disp: , Rfl: 5    SYNTHROID 125 mcg tablet, TAKE 1 TABLET BY MOUTH EVERY DAY IN PLACE OF 112MCG, Disp: , Rfl: 1    triamcinolone acetonide 0.1% (KENALOG) 0.1 % cream, Apply topically 2 (two) times daily as needed., Disp: 30 g, Rfl: 0    valACYclovir (VALTREX) 1000 MG tablet, , Disp: , Rfl:   Social History     Social History    Marital status:      Spouse name: N/A    Number of children: N/A    Years of education: N/A     Occupational History    Not on file.     Social History Main Topics    Smoking status: Never Smoker    Smokeless tobacco: Never Used    Alcohol use No    Drug use: No    Sexual activity: Not Currently     Other Topics Concern    Not on file     Social History Narrative    No narrative on file         Vitals:    07/25/18 0958   BP: 130/80     General Appearance: Alert, appropriate appearance for age. No acute distress, Chest/Respiratory Exam: normal, CTA  Cardiovascular Exam: RRR  Gastrointestinal Exam: soft, NT/ND  Pelvic Exam Female: Exam deferred.   Psychiatric Exam: Alert and oriented, appropriate affect.    Assessment: vaginal dysplasia  Plan: laser to vagina  I have discussed the risks, benefits, indications, and alternatives of the procedure in detail.  The patient verbalizes her understanding.  All questions answered.  Consents signed.  The patient agrees to proceed to proceed as planned.  She is aware of risk of recurrence and damage or scarring in vagina

## 2018-07-30 NOTE — ANESTHESIA PREPROCEDURE EVALUATION
07/30/2018  Sherrell Yip is a 58 y.o., female.    Anesthesia Evaluation     I have reviewed the Nursing Notes.      Review of Systems  Anesthesia Hx:  Hx of Anesthetic complications (PONV)   Social:  Non-Smoker    Hematology/Oncology:         -- Cancer in past history: Breast   Cardiovascular:   Exercise tolerance: good Denies Pacemaker. Hypertension  Denies Valvular problems/Murmurs.  Denies MI.  Denies CAD.    Denies CABG/stent. Dysrhythmias (SVT)   Denies Angina.             denies PVD ECG has been reviewed.    Pulmonary:  Pulmonary Normal    Renal/:  Renal/ Normal     Hepatic/GI:  Hepatic/GI Normal    Musculoskeletal:   Arthritis     Neurological:   Denies CVA. Denies Seizures.    Endocrine:   Denies Diabetes. Hypothyroidism        Physical Exam  General:  Obesity    Airway/Jaw/Neck:  AIRWAY FINDINGS: Normal           Mental Status:  Mental Status Findings: Normal        Anesthesia Plan  Type of Anesthesia, risks & benefits discussed:  Anesthesia Type:  general  Patient's Preference:   Intra-op Monitoring Plan: standard ASA monitors  Intra-op Monitoring Plan Comments:   Post Op Pain Control Plan:   Post Op Pain Control Plan Comments:   Induction:   IV  Beta Blocker:  Patient is on a Beta-Blocker and has received one dose within the past 24 hours (No further documentation required).       Informed Consent: Patient understands risks and agrees with Anesthesia plan.  Questions answered. Anesthesia consent signed with patient.  ASA Score: 2     Day of Surgery Review of History & Physical:    H&P update referred to the surgeon.         Ready For Surgery From Anesthesia Perspective.

## 2018-07-30 NOTE — DISCHARGE INSTRUCTIONS
***  BATHING:                   You may shower after your dressing is removed, but no tub baths, hot tubs, saunas or swimming until you see the doctor.    ACTIVITY LEVEL: If you have received sedation or an anesthetic, you may feel sleepy for   several hours. Rest until you are more awake. Gradually resume your normal activities  ? No heavy lifting or straining, nothing over 10 lbs., like a gallon of milk.  ? Pelvic rest- no sex, tampons or douching until follow up or instructed by doctor.  DIET:  You may resume your home diet. If nausea is present, increase your diet gradually with fluids and bland foods.    Medications:  Pain medication should be taken only if needed and as directed. If antibiotics are prescribed, the medication should be taken until completed. You will be given an updated list of you medications.  ? No driving, alcoholic beverages or signing legal documents for next 24 hours or while taking pain medication  You received ibuprofen at 1244pm.    CALL THE DOCTOR:    For any obvious bleeding (some dried blood over the incision is normal).      Redness, swelling, foul smell around incision or fever over 101.   Shortness of breath, Coughing up Bloody Sputum or Pains or Swelling in your calves.   Persistent pain or nausea not relieved by medication.   If vaginal bleeding is in excess of a normal period.   Problems urinating    If any unusual problems or difficulties occur contact your doctor. If you cannot contact your doctor but feel your signs and symptoms warrant a physicians attention return to the emergency room.    Fall Prevention  Millions of people fall every year and injure themselves. You may have had anesthesia or sedation which may increase your risk of falling. You may have health issues that put you at an increased risk of falling.     Here are ways to reduce your risk of falling.  ·   · Make your home safe by keeping walkways clear of objects you may trip over.  · Use non-slip pads  under rugs. Do not use area rugs or small throw rugs.  · Use non-slip mats in bathtubs and showers.  · Install handrails and lights on staircases.  · Do not walk in poorly lit areas.  · Do not stand on chairs or wobbly ladders.  · Use caution when reaching overhead or looking upward. This position can cause a loss of balance.  · Be sure your shoes fit properly, have non-slip bottoms and are in good condition.   · Wear shoes both inside and out. Avoid going barefoot or wearing slippers.  · Be cautious when going up and down stairs, curbs, and when walking on uneven sidewalks.  · If your balance is poor, consider using a cane or walker.  · If your fall was related to alcohol use, stop or limit alcohol intake.   · If your fall was related to use of sleeping medicines, talk to your doctor about this. You may need to reduce your dosage at bedtime if you awaken during the night to go to the bathroom.    · To reduce the need for nighttime bathroom trips:  ¨ Avoid drinking fluids for several hours before going to bed  ¨ Empty your bladder before going to bed  ¨ Men can keep a urinal at the bedside  · Stay as active as you can. Balance, flexibility, strength, and endurance all come from exercise. They all play a role in preventing falls. Ask your healthcare provider which types of activity are right for you.  · Get your vision checked on a regular basis.  · If you have pets, know where they are before you stand up or walk so you don't trip over them.  · Use night lights.

## 2018-07-30 NOTE — TRANSFER OF CARE
"Anesthesia Transfer of Care Note    Patient: Sherrell Yip    Procedure(s) Performed: Procedure(s) (LRB):  LASER TO VAGINAL CUFF THRU VAGINAL AREA (N/A)    Patient location: PACU    Anesthesia Type: general    Transport from OR: Transported from OR on room air with adequate spontaneous ventilation    Post pain: adequate analgesia    Post assessment: no apparent anesthetic complications and tolerated procedure well    Post vital signs: stable    Level of consciousness: sedated and responds to stimulation    Nausea/Vomiting: no nausea/vomiting    Complications: none    Transfer of care protocol was followed      Last vitals:   Visit Vitals  /66 (BP Location: Left arm)   Pulse 60   Temp 36.5 °C (97.7 °F) (Oral)   Resp 17   Ht 5' 7.5" (1.715 m)   Wt 99.3 kg (219 lb)   SpO2 98%   BMI 33.79 kg/m²     "

## 2018-07-30 NOTE — DISCHARGE SUMMARY
58 y.o. 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  Operative and postoperative course unremarkable.     D/c to home  Admit date 7/30/2018  7:00 AM  D/c date 07/30/2018  D/c dx vaginal dysplasia  Procedures laser to cuff        D/c diet as tolerated  D/c meds per med rec  D/c f/u one week  D/c instructions pelvic rest and avoid heavy lifting

## 2018-07-30 NOTE — OP NOTE
Pre Op Dx: vain    Post Op Dx : same    Procedure: co2 laser to vaginal cuff    Surgeon: Joseph    Date: 07/30/2018    Findings: Non staining area on cuff primarily on right and to midline    Complications: none    EBL: minimal    Specimens: none    Anesthesia: general      Patient was prepped and draped in usual sterile fashion. Bladder was drained.   An insulated speculum was placed and cuff visualized.  Acetic acid was applied but outlines of lesion were difficult to assess and then lugols was applied and non staining area was noted.  The area was treated with co2 laser in entirety  Sponge lap and needle counts were correct x 2 and patient taken to recovery in good condition.

## 2018-08-01 DIAGNOSIS — B37.31 VAGINAL YEAST INFECTION: ICD-10-CM

## 2018-08-01 DIAGNOSIS — D84.9 IMMUNOSUPPRESSION: ICD-10-CM

## 2018-08-01 RX ORDER — FLUCONAZOLE 150 MG/1
TABLET ORAL
Qty: 2 TABLET | Refills: 2 | Status: SHIPPED | OUTPATIENT
Start: 2018-08-01 | End: 2018-08-25 | Stop reason: SDUPTHER

## 2018-08-06 ENCOUNTER — HOSPITAL ENCOUNTER (OUTPATIENT)
Dept: RADIOLOGY | Facility: HOSPITAL | Age: 58
Discharge: HOME | End: 2018-08-06
Attending: SURGERY
Payer: COMMERCIAL

## 2018-08-06 VITALS — HEIGHT: 68 IN

## 2018-08-06 DIAGNOSIS — C50.911 MALIGNANT NEOPLASM OF RIGHT FEMALE BREAST: ICD-10-CM

## 2018-08-06 PROCEDURE — 77061 BREAST TOMOSYNTHESIS UNI: CPT | Mod: 26,LT,, | Performed by: RADIOLOGY

## 2018-08-06 PROCEDURE — 77065 DX MAMMO INCL CAD UNI: CPT | Mod: 26,LT,, | Performed by: RADIOLOGY

## 2018-08-06 PROCEDURE — 77065 DX MAMMO INCL CAD UNI: CPT | Mod: TC,LT

## 2018-08-06 PROCEDURE — 77061 BREAST TOMOSYNTHESIS UNI: CPT | Mod: TC,LT

## 2018-08-19 DIAGNOSIS — M05.79 RHEUMATOID ARTHRITIS INVOLVING MULTIPLE SITES WITH POSITIVE RHEUMATOID FACTOR: Chronic | ICD-10-CM

## 2018-08-20 ENCOUNTER — OFFICE VISIT (OUTPATIENT)
Dept: RHEUMATOLOGY | Facility: CLINIC | Age: 58
End: 2018-08-20
Payer: COMMERCIAL

## 2018-08-20 VITALS
HEIGHT: 68 IN | WEIGHT: 218 LBS | HEART RATE: 63 BPM | SYSTOLIC BLOOD PRESSURE: 131 MMHG | DIASTOLIC BLOOD PRESSURE: 89 MMHG | BODY MASS INDEX: 33.04 KG/M2

## 2018-08-20 DIAGNOSIS — Z79.899 HIGH RISK MEDICATION USE: Chronic | ICD-10-CM

## 2018-08-20 DIAGNOSIS — M05.79 RHEUMATOID ARTHRITIS INVOLVING MULTIPLE SITES WITH POSITIVE RHEUMATOID FACTOR: Primary | Chronic | ICD-10-CM

## 2018-08-20 PROCEDURE — 3008F BODY MASS INDEX DOCD: CPT | Mod: CPTII,S$GLB,, | Performed by: INTERNAL MEDICINE

## 2018-08-20 PROCEDURE — 99999 PR PBB SHADOW E&M-EST. PATIENT-LVL III: CPT | Mod: PBBFAC,,, | Performed by: INTERNAL MEDICINE

## 2018-08-20 PROCEDURE — 99214 OFFICE O/P EST MOD 30 MIN: CPT | Mod: S$GLB,,, | Performed by: INTERNAL MEDICINE

## 2018-08-20 RX ORDER — METHOTREXATE 2.5 MG/1
TABLET ORAL
Qty: 78 TABLET | Refills: 0 | Status: SHIPPED | OUTPATIENT
Start: 2018-08-20 | End: 2018-08-23 | Stop reason: SDUPTHER

## 2018-08-20 RX ORDER — HYDROXYCHLOROQUINE SULFATE 200 MG/1
200 TABLET, FILM COATED ORAL 2 TIMES DAILY
Qty: 180 TABLET | Refills: 1 | Status: SHIPPED | OUTPATIENT
Start: 2018-08-20 | End: 2018-12-19 | Stop reason: SDUPTHER

## 2018-08-20 ASSESSMENT — ROUTINE ASSESSMENT OF PATIENT INDEX DATA (RAPID3)
PAIN SCORE: 7
AM STIFFNESS SCORE: 1, YES
WHEN YOU AWAKENED IN THE MORNING OVER THE LAST WEEK, PLEASE INDICATE THE AMOUNT OF TIME IT TAKES UNTIL YOU ARE AS LIMBER AS YOU WILL BE FOR THE DAY: 2 HOURS
TOTAL RAPID3 SCORE: 5.06
PATIENT GLOBAL ASSESSMENT SCORE: 6.5
PSYCHOLOGICAL DISTRESS SCORE: 6.6
FATIGUE SCORE: 7
MDHAQ FUNCTION SCORE: .5

## 2018-08-20 NOTE — ASSESSMENT & PLAN NOTE
Disease activity low on mtx + HCQ and without excess physical activity    Plan cont current med  RTC me in Dec  Get xrays hands before f/u

## 2018-08-20 NOTE — ASSESSMENT & PLAN NOTE
Nl CMP and CBC June  Has eye appt scheduled for Sept    Will schedule lab in Sept and Dec and RTC in Dec

## 2018-08-20 NOTE — PROGRESS NOTES
"Subjective:       Patient ID: Sherrell Yip is a 58 y.o. female.    Chief Complaint: Disease Management    Former [employer] with [employer] system   Just got approved for SSI      CCP+ RA with positive ANTONIA    MTX since Oct 2015     mg/d since April 2016 -Nov 2016 when switched to LFA  LFA added Nov 2016 then stopped 2017 due to elevated AST/ALT  Prednisone bridge 1 month, July 2015  NSAID: hx Celebrex; hx ibuprofen caused stomach ache  HCQ since  June 2017     Currently:   bid  MTX 15 mg / week and 1mg folic acid everyday  Meloxicam 15 mg when needed         Stopped Cymbalta for 2 months to try to lose weight; pain and flare-ups increased and she restarted 60mg now with improvement    Last week R knee was bad; took voltaren gel and better now  Hands still stiff some days  Notes wrists are deformed;    full but not strong  "90% better" than when first got RA    Still has to stop and rest intermittently    Had skin cancer on nose in March  Had cervical lesion removed in July    Review of Systems   Constitutional: Positive for fatigue and unexpected weight change. Negative for fever.   HENT: Positive for mouth sores. Negative for trouble swallowing.         Dry mouth   Eyes: Negative for redness.        Dry eyes   Respiratory: Negative for cough and shortness of breath.    Cardiovascular: Negative for chest pain.   Gastrointestinal: Negative for abdominal pain, constipation and diarrhea.   Skin: Negative for rash.   Neurological: Negative for headaches.   Hematological: Negative for adenopathy. Does not bruise/bleed easily.   Psychiatric/Behavioral: Negative for sleep disturbance.         Objective:   /89   Pulse 63   Ht 5' 8" (1.727 m)   Wt 98.9 kg (218 lb)   BMI 33.15 kg/m²      Physical Exam     Physical Exam     Tenderness:   Right hand: 2nd MCP and 3rd MCP  RLE: tibiotalar  Right foot: 1st MTP    Swelling:   Right hand: 2nd MCP    VALDERRAMA-28 tender joint count: 2  VALDERRAMA-28 swollen joint count: " 1    Lab Results   Component Value Date    SEDRATE 5 06/13/2018 6/13/18 [facility] lab nl CMP, CBC, ESR 5, CRP, 3.5     Assessment:       1. Rheumatoid arthritis involving multiple sites with positive rheumatoid factor    2. High risk medication use            Plan:       Problem List Items Addressed This Visit        Active Problems    High risk medication use (Chronic)     Nl CMP and CBC June  Has eye appt scheduled for Sept    Will schedule lab in Sept and Dec and RTC in Dec         Rheumatoid arthritis involving multiple sites with positive rheumatoid factor - Primary (Chronic)     Disease activity low on mtx + HCQ and without excess physical activity    Plan cont current med  RTC me in Dec  Get xrays hands before f/u         Relevant Medications    hydroxychloroquine (PLAQUENIL) 200 mg tablet    Other Relevant Orders    X-Ray Hand 3 View Bilateral

## 2018-08-23 DIAGNOSIS — M05.79 RHEUMATOID ARTHRITIS INVOLVING MULTIPLE SITES WITH POSITIVE RHEUMATOID FACTOR: Chronic | ICD-10-CM

## 2018-08-23 RX ORDER — METHOTREXATE 2.5 MG/1
TABLET ORAL
Qty: 24 TABLET | Refills: 0 | Status: SHIPPED | OUTPATIENT
Start: 2018-08-23 | End: 2018-12-01 | Stop reason: SDUPTHER

## 2018-08-25 DIAGNOSIS — D84.9 IMMUNOSUPPRESSION: ICD-10-CM

## 2018-08-25 DIAGNOSIS — B37.31 VAGINAL YEAST INFECTION: ICD-10-CM

## 2018-08-27 RX ORDER — FLUCONAZOLE 150 MG/1
TABLET ORAL
Qty: 2 TABLET | Refills: 2 | Status: SHIPPED | OUTPATIENT
Start: 2018-08-27 | End: 2018-10-08 | Stop reason: SDUPTHER

## 2018-09-18 LAB
ALBUMIN SERPL-MCNC: 4.3 G/DL (ref 3.6–5.1)
ALBUMIN/GLOB SERPL: 1.7 (CALC) (ref 1–2.5)
ALP SERPL-CCNC: 89 U/L (ref 33–130)
ALT SERPL-CCNC: 27 U/L (ref 6–29)
AST SERPL-CCNC: 22 U/L (ref 10–35)
BASOPHILS # BLD AUTO: 62 CELLS/UL (ref 0–200)
BASOPHILS NFR BLD AUTO: 1.4 %
BILIRUB SERPL-MCNC: 0.5 MG/DL (ref 0.2–1.2)
BUN SERPL-MCNC: 11 MG/DL (ref 7–25)
BUN/CREAT SERPL: NORMAL (CALC) (ref 6–22)
CALCIUM SERPL-MCNC: 9.4 MG/DL (ref 8.6–10.4)
CHLORIDE SERPL-SCNC: 103 MMOL/L (ref 98–110)
CO2 SERPL-SCNC: 29 MMOL/L (ref 20–32)
CREAT SERPL-MCNC: 0.84 MG/DL (ref 0.5–1.05)
CRP SERPL-MCNC: 9.1 MG/L
EOSINOPHIL # BLD AUTO: 101 CELLS/UL (ref 15–500)
EOSINOPHIL NFR BLD AUTO: 2.3 %
ERYTHROCYTE [DISTWIDTH] IN BLOOD BY AUTOMATED COUNT: 13.8 % (ref 11–15)
ERYTHROCYTE [SEDIMENTATION RATE] IN BLOOD BY WESTERGREN METHOD: 6 MM/H
GFR SERPL CREATININE-BSD FRML MDRD: 77 ML/MIN/1.73M2
GLOBULIN SER CALC-MCNC: 2.6 G/DL (CALC) (ref 1.9–3.7)
GLUCOSE SERPL-MCNC: 98 MG/DL (ref 65–139)
HCT VFR BLD AUTO: 39.9 % (ref 35–45)
HGB BLD-MCNC: 13.5 G/DL (ref 11.7–15.5)
LYMPHOCYTES # BLD AUTO: 1263 CELLS/UL (ref 850–3900)
LYMPHOCYTES NFR BLD AUTO: 28.7 %
MCH RBC QN AUTO: 31.5 PG (ref 27–33)
MCHC RBC AUTO-ENTMCNC: 33.8 G/DL (ref 32–36)
MCV RBC AUTO: 93 FL (ref 80–100)
MONOCYTES # BLD AUTO: 405 CELLS/UL (ref 200–950)
MONOCYTES NFR BLD AUTO: 9.2 %
NEUTROPHILS # BLD AUTO: 2570 CELLS/UL (ref 1500–7800)
NEUTROPHILS NFR BLD AUTO: 58.4 %
PLATELET # BLD AUTO: 273 THOUSAND/UL (ref 140–400)
PMV BLD REES-ECKER: 9.4 FL (ref 7.5–12.5)
POTASSIUM SERPL-SCNC: 4.3 MMOL/L (ref 3.5–5.3)
PROT SERPL-MCNC: 6.9 G/DL (ref 6.1–8.1)
RBC # BLD AUTO: 4.29 MILLION/UL (ref 3.8–5.1)
SODIUM SERPL-SCNC: 140 MMOL/L (ref 135–146)
WBC # BLD AUTO: 4.4 THOUSAND/UL (ref 3.8–10.8)

## 2018-10-08 DIAGNOSIS — D84.9 IMMUNOSUPPRESSION: ICD-10-CM

## 2018-10-08 DIAGNOSIS — B37.31 VAGINAL YEAST INFECTION: ICD-10-CM

## 2018-10-08 RX ORDER — FLUCONAZOLE 150 MG/1
150 TABLET ORAL
Qty: 6 TABLET | Refills: 2 | Status: SHIPPED | OUTPATIENT
Start: 2018-10-08 | End: 2019-03-07 | Stop reason: SDUPTHER

## 2018-10-28 DIAGNOSIS — M05.79 RHEUMATOID ARTHRITIS INVOLVING MULTIPLE SITES WITH POSITIVE RHEUMATOID FACTOR: Chronic | ICD-10-CM

## 2018-10-29 RX ORDER — FOLIC ACID 1 MG/1
TABLET ORAL
Qty: 90 TABLET | Refills: 3 | Status: SHIPPED | OUTPATIENT
Start: 2018-10-29 | End: 2019-04-29 | Stop reason: SDUPTHER

## 2018-11-19 ENCOUNTER — TELEPHONE (OUTPATIENT)
Dept: RHEUMATOLOGY | Facility: CLINIC | Age: 58
End: 2018-11-19

## 2018-11-19 NOTE — TELEPHONE ENCOUNTER
----- Message from Flory Bridges sent at 11/19/2018  1:41 PM CST -----  Contact: Self  Pt is needing a call back as soon as possible pt can be reached @home#

## 2018-11-19 NOTE — TELEPHONE ENCOUNTER
Patient very upset.  Received a  letter from the [facility] informing her she will no longer receive her disability because of the paperwork that was submitted in September.  Please call Benefit Manager at [facility]   Please call Abhi Keen @ [phone]  Ext [phone].

## 2018-11-20 NOTE — TELEPHONE ENCOUNTER
She received a letter that said she would lose her benefits at [employer]  Letter says call Abhi Keen  Claim # [id number]  I called him and left a VM with my mobile # requesting call back  WD

## 2018-12-01 DIAGNOSIS — M05.79 RHEUMATOID ARTHRITIS INVOLVING MULTIPLE SITES WITH POSITIVE RHEUMATOID FACTOR: Chronic | ICD-10-CM

## 2018-12-03 RX ORDER — METHOTREXATE 2.5 MG/1
TABLET ORAL
Qty: 24 TABLET | Refills: 0 | Status: SHIPPED | OUTPATIENT
Start: 2018-12-03 | End: 2018-12-10

## 2018-12-04 ENCOUNTER — HOSPITAL ENCOUNTER (OUTPATIENT)
Dept: RADIOLOGY | Facility: HOSPITAL | Age: 58
Discharge: HOME | End: 2018-12-04
Attending: INTERNAL MEDICINE
Payer: COMMERCIAL

## 2018-12-04 DIAGNOSIS — M05.79 RHEUMATOID ARTHRITIS INVOLVING MULTIPLE SITES WITH POSITIVE RHEUMATOID FACTOR: Chronic | ICD-10-CM

## 2018-12-04 LAB
ALBUMIN SERPL-MCNC: 4.3 G/DL (ref 3.6–5.1)
ALBUMIN/GLOB SERPL: 1.7 (CALC) (ref 1–2.5)
ALP SERPL-CCNC: 90 U/L (ref 33–130)
ALT SERPL-CCNC: 29 U/L (ref 6–29)
AST SERPL-CCNC: 23 U/L (ref 10–35)
BASOPHILS # BLD AUTO: 49 CELLS/UL (ref 0–200)
BASOPHILS NFR BLD AUTO: 1.2 %
BILIRUB SERPL-MCNC: 0.4 MG/DL (ref 0.2–1.2)
BUN SERPL-MCNC: 11 MG/DL (ref 7–25)
BUN/CREAT SERPL: ABNORMAL (CALC) (ref 6–22)
CALCIUM SERPL-MCNC: 9.1 MG/DL (ref 8.6–10.4)
CHLORIDE SERPL-SCNC: 103 MMOL/L (ref 98–110)
CO2 SERPL-SCNC: 30 MMOL/L (ref 20–32)
CREAT SERPL-MCNC: 0.76 MG/DL (ref 0.5–1.05)
CRP SERPL-MCNC: 6.2 MG/L
EOSINOPHIL # BLD AUTO: 90 CELLS/UL (ref 15–500)
EOSINOPHIL NFR BLD AUTO: 2.2 %
ERYTHROCYTE [DISTWIDTH] IN BLOOD BY AUTOMATED COUNT: 13.5 % (ref 11–15)
ERYTHROCYTE [SEDIMENTATION RATE] IN BLOOD BY WESTERGREN METHOD: 9 MM/H
GFR SERPL CREATININE-BSD FRML MDRD: 87 ML/MIN/1.73M2
GLOBULIN SER CALC-MCNC: 2.5 G/DL (CALC) (ref 1.9–3.7)
GLUCOSE SERPL-MCNC: 105 MG/DL (ref 65–99)
HCT VFR BLD AUTO: 38.7 % (ref 35–45)
HGB BLD-MCNC: 13.4 G/DL (ref 11.7–15.5)
LYMPHOCYTES # BLD AUTO: 1115 CELLS/UL (ref 850–3900)
LYMPHOCYTES NFR BLD AUTO: 27.2 %
MCH RBC QN AUTO: 32.4 PG (ref 27–33)
MCHC RBC AUTO-ENTMCNC: 34.6 G/DL (ref 32–36)
MCV RBC AUTO: 93.7 FL (ref 80–100)
MONOCYTES # BLD AUTO: 488 CELLS/UL (ref 200–950)
MONOCYTES NFR BLD AUTO: 11.9 %
NEUTROPHILS # BLD AUTO: 2358 CELLS/UL (ref 1500–7800)
NEUTROPHILS NFR BLD AUTO: 57.5 %
PLATELET # BLD AUTO: 259 THOUSAND/UL (ref 140–400)
PMV BLD REES-ECKER: 9.4 FL (ref 7.5–12.5)
POTASSIUM SERPL-SCNC: 4.4 MMOL/L (ref 3.5–5.3)
PROT SERPL-MCNC: 6.8 G/DL (ref 6.1–8.1)
RBC # BLD AUTO: 4.13 MILLION/UL (ref 3.8–5.1)
SODIUM SERPL-SCNC: 140 MMOL/L (ref 135–146)
WBC # BLD AUTO: 4.1 THOUSAND/UL (ref 3.8–10.8)

## 2018-12-04 PROCEDURE — 73130 X-RAY EXAM OF HAND: CPT | Mod: 26,59,LT, | Performed by: RADIOLOGY

## 2018-12-04 PROCEDURE — 73130 X-RAY EXAM OF HAND: CPT | Mod: 50,TC,FY

## 2018-12-04 PROCEDURE — 73130 X-RAY EXAM OF HAND: CPT | Mod: 26,RT,, | Performed by: RADIOLOGY

## 2018-12-10 ENCOUNTER — OFFICE VISIT (OUTPATIENT)
Dept: RHEUMATOLOGY | Facility: CLINIC | Age: 58
End: 2018-12-10
Payer: COMMERCIAL

## 2018-12-10 VITALS
BODY MASS INDEX: 33.75 KG/M2 | SYSTOLIC BLOOD PRESSURE: 139 MMHG | HEIGHT: 68 IN | WEIGHT: 222.69 LBS | HEART RATE: 64 BPM | DIASTOLIC BLOOD PRESSURE: 82 MMHG

## 2018-12-10 DIAGNOSIS — Z79.899 HIGH RISK MEDICATION USE: Chronic | ICD-10-CM

## 2018-12-10 DIAGNOSIS — M05.79 RHEUMATOID ARTHRITIS INVOLVING MULTIPLE SITES WITH POSITIVE RHEUMATOID FACTOR: Primary | Chronic | ICD-10-CM

## 2018-12-10 PROCEDURE — 99999 PR PBB SHADOW E&M-EST. PATIENT-LVL III: CPT | Mod: PBBFAC,,, | Performed by: INTERNAL MEDICINE

## 2018-12-10 PROCEDURE — 3008F BODY MASS INDEX DOCD: CPT | Mod: CPTII,S$GLB,, | Performed by: INTERNAL MEDICINE

## 2018-12-10 PROCEDURE — 99214 OFFICE O/P EST MOD 30 MIN: CPT | Mod: S$GLB,,, | Performed by: INTERNAL MEDICINE

## 2018-12-10 RX ORDER — METHOTREXATE 2.5 MG/1
15 TABLET ORAL
Qty: 78 TABLET | Refills: 0 | Status: SHIPPED | OUTPATIENT
Start: 2018-12-10 | End: 2019-04-29 | Stop reason: SDUPTHER

## 2018-12-10 ASSESSMENT — ROUTINE ASSESSMENT OF PATIENT INDEX DATA (RAPID3)
FATIGUE SCORE: 7
TOTAL RAPID3 SCORE: 5.44
AM STIFFNESS SCORE: 1, YES
PATIENT GLOBAL ASSESSMENT SCORE: 6.5
PAIN SCORE: 6.5
WHEN YOU AWAKENED IN THE MORNING OVER THE LAST WEEK, PLEASE INDICATE THE AMOUNT OF TIME IT TAKES UNTIL YOU ARE AS LIMBER AS YOU WILL BE FOR THE DAY: 1 HOUR
MDHAQ FUNCTION SCORE: 1
PSYCHOLOGICAL DISTRESS SCORE: 7.7

## 2018-12-10 NOTE — PROGRESS NOTES
"Subjective:       Patient ID: Sherrell Yip is a 58 y.o. female.    Chief Complaint: Disease Management    Former [employer] with [employer] system   Just got approved for SSI      CCP+ RA with positive ANTONIA    MTX since Oct 2015     mg/d since April 2016 -Nov 2016 when switched to LFA  LFA added Nov 2016 then stopped 2017 due to elevated AST/ALT  Prednisone bridge 1 month, July 2015  NSAID: hx Celebrex; hx ibuprofen caused stomach ache  HCQ since  June 2017     Currently:   bid  MTX 15 mg / week and 1mg folic acid everyday  Meloxicam 15 mg when needed    took meloxicam daily last week but usually about twice a week  Missed 2 doses of methotrexate due to mail not coming  Cold weather causing more pain  Knees and wrists swell  R knee worse; wears a brace  1 hour morning stiffness    Anticipates Medicare card    Review of Systems   Constitutional: Positive for unexpected weight change. Negative for fatigue and fever.   HENT: Negative for mouth sores and trouble swallowing.         Dry mouth   Eyes: Negative for redness.        Dry eyes   Respiratory: Negative for cough and shortness of breath.    Cardiovascular: Negative for chest pain.   Gastrointestinal: Negative for abdominal pain, constipation and diarrhea.   Skin: Negative for rash.   Neurological: Negative for headaches.   Hematological: Negative for adenopathy. Does not bruise/bleed easily.   Psychiatric/Behavioral: Negative for sleep disturbance.         Objective:   /82   Pulse 64   Ht 5' 8" (1.727 m)   Wt 101 kg (222 lb 10.6 oz)   BMI 33.86 kg/m²      Physical Exam     Physical Exam     Tenderness:   RUE: ulnohumeral and radiohumeral and wrist  LUE: ulnohumeral and radiohumeral  Right hand: 2nd MCP    Swelling:   Right hand: 1st MCP, 2nd MCP and 3rd MCP    VALDERRAMA-28 tender joint count: 4  VALDERRAMA-28 swollen joint count: 3  ESR (mm/hr): 9  Patient global assessment: 65  VALDERRAMA-28 score: 4.05 (Moderate Disease Activity)    Lab Results   Component Value " Date    SEDRATE 9 12/03/2018          Assessment:       1. Rheumatoid arthritis involving multiple sites with positive rheumatoid factor    2. High risk medication use            Plan:       Problem List Items Addressed This Visit        Active Problems    High risk medication use (Chronic)     Lab ok on 15 mg/week mtx  Cont q3m lab monitoring; next in March; uses [facility]         Relevant Orders    CBC auto differential    Comprehensive metabolic panel    Rheumatoid arthritis involving multiple sites with positive rheumatoid factor - Primary (Chronic)     Moderate RA on mtx plus hcq  1 or 2 xray erosions  Still afraid to take biologic  Will cont current and cont monitoring  Next lab March; RTC after         Relevant Medications    methotrexate 2.5 MG Tab    Other Relevant Orders    CBC auto differential    Sedimentation rate    C-reactive protein

## 2018-12-10 NOTE — ASSESSMENT & PLAN NOTE
Moderate RA on mtx plus hcq  1 or 2 xray erosions  Still afraid to take biologic  Will cont current and cont monitoring  Next lab March; RTC after

## 2018-12-19 DIAGNOSIS — M05.79 RHEUMATOID ARTHRITIS INVOLVING MULTIPLE SITES WITH POSITIVE RHEUMATOID FACTOR: Chronic | ICD-10-CM

## 2018-12-19 RX ORDER — HYDROXYCHLOROQUINE SULFATE 200 MG/1
200 TABLET, FILM COATED ORAL 2 TIMES DAILY
Qty: 180 TABLET | Refills: 1 | Status: SHIPPED | OUTPATIENT
Start: 2018-12-19 | End: 2019-04-29 | Stop reason: SDUPTHER

## 2019-03-07 ENCOUNTER — OFFICE VISIT (OUTPATIENT)
Dept: HEMATOLOGY/ONCOLOGY | Facility: CLINIC | Age: 59
End: 2019-03-07
Payer: COMMERCIAL

## 2019-03-07 VITALS
WEIGHT: 217.38 LBS | HEART RATE: 72 BPM | SYSTOLIC BLOOD PRESSURE: 138 MMHG | DIASTOLIC BLOOD PRESSURE: 84 MMHG | TEMPERATURE: 99 F | BODY MASS INDEX: 32.94 KG/M2 | HEIGHT: 68 IN | OXYGEN SATURATION: 97 %

## 2019-03-07 DIAGNOSIS — Z85.3 PERSONAL HISTORY OF MALIGNANT NEOPLASM OF BREAST: Primary | ICD-10-CM

## 2019-03-07 DIAGNOSIS — M05.79 RHEUMATOID ARTHRITIS INVOLVING MULTIPLE SITES WITH POSITIVE RHEUMATOID FACTOR: ICD-10-CM

## 2019-03-07 PROCEDURE — 99213 OFFICE O/P EST LOW 20 MIN: CPT | Mod: S$GLB,,, | Performed by: INTERNAL MEDICINE

## 2019-03-07 PROCEDURE — 3008F PR BODY MASS INDEX (BMI) DOCUMENTED: ICD-10-PCS | Mod: CPTII,S$GLB,, | Performed by: INTERNAL MEDICINE

## 2019-03-07 PROCEDURE — 99999 PR PBB SHADOW E&M-EST. PATIENT-LVL III: ICD-10-PCS | Mod: PBBFAC,,, | Performed by: INTERNAL MEDICINE

## 2019-03-07 PROCEDURE — 99999 PR PBB SHADOW E&M-EST. PATIENT-LVL III: CPT | Mod: PBBFAC,,, | Performed by: INTERNAL MEDICINE

## 2019-03-07 PROCEDURE — 3008F BODY MASS INDEX DOCD: CPT | Mod: CPTII,S$GLB,, | Performed by: INTERNAL MEDICINE

## 2019-03-07 PROCEDURE — 99213 PR OFFICE/OUTPT VISIT, EST, LEVL III, 20-29 MIN: ICD-10-PCS | Mod: S$GLB,,, | Performed by: INTERNAL MEDICINE

## 2019-03-07 RX ORDER — ALENDRONATE SODIUM 35 MG/1
35 TABLET ORAL WEEKLY
Qty: 12 TABLET | Refills: 3 | Status: SHIPPED | OUTPATIENT
Start: 2019-03-07

## 2019-03-07 RX ORDER — DULOXETIN HYDROCHLORIDE 60 MG/1
CAPSULE, DELAYED RELEASE ORAL
COMMUNITY
Start: 2018-12-27 | End: 2020-08-28 | Stop reason: SDUPTHER

## 2019-03-07 RX ORDER — FLUCONAZOLE 150 MG/1
TABLET ORAL
COMMUNITY
Start: 2018-11-21 | End: 2019-04-29 | Stop reason: SDUPTHER

## 2019-03-07 NOTE — PROGRESS NOTES
Chief Complaint :   Breast Cancer.    Hx of Present illness :  Patient is a 59 y.o. year old female who presents to the clinic today for  Oncology followup. Dx'd to have invasive carcinoma involving right breast in . Underwent right mastectomy with reconstruction.. Grade 1, ER +; GA +, HER- 2 Neg; Node negattive. Stage 1. Low risk of recurrence on Oncotype Dx. Completed five years of Femara therapy. Routine Annual visit. .      Allergies :    Review of patient's allergies indicates:   Allergen Reactions    Penicillins     Sulfa (sulfonamide antibiotics)     Flagyl [metronidazole hcl]        Occupation :  Homaker. Retired     Transfusion :  None    Menstrual & obstetric Hx :  3; Para 3.  Age of menarche: 13  Age of first pregnancy: 22  Lactation history:  Used Breast Pump  Age of menopause:  Hysterectomy at age 40 because of Heavy bleeding related to fifroids  HRT: premarin for 8 years    Present Meds :   Medication List with Changes/Refills   Current Medications    ALENDRONATE (FOSAMAX) 35 MG TABLET    Take 1 tablet (35 mg total) by mouth once a week.    DICLOFENAC SODIUM 1 % GEL        FLUCONAZOLE (DIFLUCAN) 150 MG TAB    Take 1 tablet (150 mg total) by mouth Every 3 (three) days. for 3 doses    FOLBIC 2.5-25-2 MG TAB    Take 1 tablet by mouth once daily.    FOLIC ACID (FOLVITE) 1 MG TABLET    TAKE 1 TABLET DAILY    FUROSEMIDE (LASIX) 20 MG TABLET        HYDROXYCHLOROQUINE (PLAQUENIL) 200 MG TABLET    Take 1 tablet (200 mg total) by mouth 2 (two) times daily.    MELOXICAM (MOBIC) 15 MG TABLET    TAKE 1 TABLET DAILY AS NEEDED FOR PAIN    METHOTREXATE 2.5 MG TAB    Take 6 tablets (15 mg total) by mouth every 7 days.    METOPROLOL SUCCINATE (TOPROL-XL) 50 MG 24 HR TABLET    Take 50 mg by mouth once daily.    POTASSIUM CHLORIDE (MICRO-K) 10 MEQ CPSR    Take 10 mEq by mouth once daily.    SYNTHROID 125 MCG TABLET    TAKE 1 TABLET BY MOUTH EVERY DAY IN PLACE OF 112MCG    TRIAMCINOLONE  ACETONIDE 0.1% (KENALOG) 0.1 % CREAM    Apply topically 2 (two) times daily as needed.       Past Medical Hx :  Carcinoma Right Breast; Hypothyroidism, Rheumatoid arthritis, Hypertension. No Hx of PUD, Hepatitis, Liver disease, TB, Sarcoid, Lupus, CVA, seizure disorder, DVT, PE> No past hx of Chemotherapy or radiation therapy    Past Medical Hx :  Past Medical History:   Diagnosis Date    Abnormal Pap smear     Breast CA     Dry mouth     Hypertension     Hypothyroidism     PONV (postoperative nausea and vomiting)     RA (rheumatoid arthritis)     Rheumatoid arteritis     SVT (supraventricular tachycardia)     Usual hyperplasia of lactiferous duct        Travel Hx :  N/A    Immunization :  Immunization History   Administered Date(s) Administered    Influenza 11/16/2012, 09/27/2013       Family Hx :  Family History   Problem Relation Age of Onset    Alzheimer's disease Mother     Lupus Father     Parkinsonism Father     Lupus Daughter     Cancer Cousin         lymphoma    Breast cancer Neg Hx     Colon cancer Neg Hx     Ovarian cancer Neg Hx        Social Hx :  Social History     Socioeconomic History    Marital status:      Spouse name: Not on file    Number of children: Not on file    Years of education: Not on file    Highest education level: Not on file   Social Needs    Financial resource strain: Not on file    Food insecurity - worry: Not on file    Food insecurity - inability: Not on file    Transportation needs - medical: Not on file    Transportation needs - non-medical: Not on file   Occupational History    Not on file   Tobacco Use    Smoking status: Never Smoker    Smokeless tobacco: Never Used   Substance and Sexual Activity    Alcohol use: No    Drug use: No    Sexual activity: Not Currently   Other Topics Concern    Not on file   Social History Narrative    Not on file       Surgery : LENY with BSO; Breast Bx; Right mastectomy with recontruction. Partial  Thyroidectomy. Colonoscopy    Symptoms :    Review of Systems   Constitutional: Positive for malaise/fatigue. Negative for chills, diaphoresis, fever and weight loss.   HENT: Positive for congestion. Negative for hearing loss, nosebleeds, sinus pain and sore throat.    Eyes: Negative for blurred vision, double vision and photophobia.   Respiratory: Negative for cough, hemoptysis, sputum production, shortness of breath and wheezing.         Few weeks ago went to Urgent care for respiratory infection   Cardiovascular: Negative.  Negative for chest pain, palpitations, orthopnea, claudication, leg swelling and PND.   Gastrointestinal: Negative for abdominal pain, blood in stool, constipation, diarrhea, heartburn, nausea and vomiting.   Genitourinary: Positive for frequency. Negative for dysuria, flank pain, hematuria and urgency.   Musculoskeletal: Positive for joint pain. Negative for back pain, falls, myalgias and neck pain.   Skin: Negative for itching and rash.   Neurological: Negative for dizziness, tingling, tremors, sensory change, weakness and headaches.   Endo/Heme/Allergies: Negative for polydipsia. Does not bruise/bleed easily.   Psychiatric/Behavioral: Negative for depression and memory loss. The patient is not nervous/anxious and does not have insomnia.        Physical Exam :   present in the room  Physical Exam   Constitutional: She is oriented to person, place, and time and well-developed, well-nourished, and in no distress. No distress.   HENT:   Head: Normocephalic and atraumatic.   Right Ear: External ear normal.   Left Ear: External ear normal.   Nose: Nose normal.   Mouth/Throat: Oropharynx is clear and moist.   Eyes: Conjunctivae and EOM are normal. Pupils are equal, round, and reactive to light. Right eye exhibits no discharge. Left eye exhibits no discharge. No scleral icterus.   Neck: Normal range of motion. Neck supple. No JVD present. No tracheal deviation present. No thyromegaly present.        Cardiovascular: Normal rate, regular rhythm, normal heart sounds and intact distal pulses.   Pulmonary/Chest: Effort normal. No stridor. No respiratory distress. She has no wheezes. She has no rales. She exhibits no tenderness.       Abdominal: Soft. Bowel sounds are normal. She exhibits no distension and no mass. There is no tenderness. There is no rebound and no guarding.   Genitourinary:   Genitourinary Comments: Not Examined   Musculoskeletal: Normal range of motion.   Lymphadenopathy:        Head (right side): No submental, no submandibular, no tonsillar, no preauricular, no posterior auricular and no occipital adenopathy present.        Head (left side): No submental, no submandibular, no tonsillar, no preauricular, no posterior auricular and no occipital adenopathy present.     She has no axillary adenopathy.        Right: No inguinal, no supraclavicular and no epitrochlear adenopathy present.        Left: No inguinal, no supraclavicular and no epitrochlear adenopathy present.   Neurological: She is alert and oriented to person, place, and time. She has normal motor skills, normal sensation, normal strength, normal reflexes and intact cranial nerves. Gait normal. GCS score is 15.   Skin: Skin is warm, dry and intact. No rash noted. She is not diaphoretic. No cyanosis. No pallor. Nails show no clubbing.   Psychiatric: Mood, memory, affect and judgment normal.         Labs & Imaging :  02/28/19 : NFBS 119; Cr.0.81. Ca 9.5; Bili 0.5 ALP 88; hgb 13.1; Hct 31.9;  MCV 92.9; platelets 333,000. ANC 3,888.        Dx :   Remote Hx of Carcinoma Right Breast. Completed five years of Harmonal Therapy. Hypothyroidism. Rheumatoid arthritis.      Assessment & Plan: reviewed with patient. Does not want to go on Prolia. Continue Fosamax 35 Mg once a week. Oncology followup in one year, with CBC,CMP,CEA,CA27.29.      Distress Screening Results: Psychosocial Distress screening score of   noted and reviewed. No intervention  indicated.

## 2019-03-08 ENCOUNTER — TELEPHONE (OUTPATIENT)
Dept: RHEUMATOLOGY | Facility: CLINIC | Age: 59
End: 2019-03-08

## 2019-03-08 NOTE — TELEPHONE ENCOUNTER
Called to inform patient of the need to reschedule 4/12/19 appointment, due to a change in the provider's schedule.  Patient verbalizes understanding.  Appointment rescheduled to 4/29/19.

## 2019-03-11 DIAGNOSIS — M05.79 RHEUMATOID ARTHRITIS INVOLVING MULTIPLE SITES WITH POSITIVE RHEUMATOID FACTOR: Chronic | ICD-10-CM

## 2019-03-11 RX ORDER — METHOTREXATE 2.5 MG/1
TABLET ORAL
Qty: 78 TABLET | Refills: 0 | OUTPATIENT
Start: 2019-03-11

## 2019-04-18 LAB
ALBUMIN SERPL-MCNC: 4.4 G/DL (ref 3.6–5.1)
ALBUMIN/GLOB SERPL: 1.9 (CALC) (ref 1–2.5)
ALP SERPL-CCNC: 71 U/L (ref 33–130)
ALT SERPL-CCNC: 31 U/L (ref 6–29)
AST SERPL-CCNC: 26 U/L (ref 10–35)
BASOPHILS # BLD AUTO: 49 CELLS/UL (ref 0–200)
BASOPHILS NFR BLD AUTO: 1.2 %
BILIRUB SERPL-MCNC: 0.4 MG/DL (ref 0.2–1.2)
BUN SERPL-MCNC: 9 MG/DL (ref 7–25)
BUN/CREAT SERPL: ABNORMAL (CALC) (ref 6–22)
CALCIUM SERPL-MCNC: 9.4 MG/DL (ref 8.6–10.4)
CHLORIDE SERPL-SCNC: 103 MMOL/L (ref 98–110)
CO2 SERPL-SCNC: 33 MMOL/L (ref 20–32)
CREAT SERPL-MCNC: 0.87 MG/DL (ref 0.5–1.05)
CRP SERPL-MCNC: 4.7 MG/L
EOSINOPHIL # BLD AUTO: 111 CELLS/UL (ref 15–500)
EOSINOPHIL NFR BLD AUTO: 2.7 %
ERYTHROCYTE [DISTWIDTH] IN BLOOD BY AUTOMATED COUNT: 13.4 % (ref 11–15)
ERYTHROCYTE [SEDIMENTATION RATE] IN BLOOD BY WESTERGREN METHOD: 9 MM/H
GFRSERPLBLD MDRD-ARVRAT: 73 ML/MIN/1.73M2
GLOBULIN SER CALC-MCNC: 2.3 G/DL (CALC) (ref 1.9–3.7)
GLUCOSE SERPL-MCNC: 100 MG/DL (ref 65–99)
HCT VFR BLD AUTO: 39 % (ref 35–45)
HGB BLD-MCNC: 13 G/DL (ref 11.7–15.5)
LYMPHOCYTES # BLD AUTO: 959 CELLS/UL (ref 850–3900)
LYMPHOCYTES NFR BLD AUTO: 23.4 %
MCH RBC QN AUTO: 31.8 PG (ref 27–33)
MCHC RBC AUTO-ENTMCNC: 33.3 G/DL (ref 32–36)
MCV RBC AUTO: 95.4 FL (ref 80–100)
MONOCYTES # BLD AUTO: 431 CELLS/UL (ref 200–950)
MONOCYTES NFR BLD AUTO: 10.5 %
NEUTROPHILS # BLD AUTO: 2550 CELLS/UL (ref 1500–7800)
NEUTROPHILS NFR BLD AUTO: 62.2 %
PLATELET # BLD AUTO: 263 THOUSAND/UL (ref 140–400)
PMV BLD REES-ECKER: 9.3 FL (ref 7.5–12.5)
POTASSIUM SERPL-SCNC: 4.3 MMOL/L (ref 3.5–5.3)
PROT SERPL-MCNC: 6.7 G/DL (ref 6.1–8.1)
RBC # BLD AUTO: 4.09 MILLION/UL (ref 3.8–5.1)
SODIUM SERPL-SCNC: 141 MMOL/L (ref 135–146)
WBC # BLD AUTO: 4.1 THOUSAND/UL (ref 3.8–10.8)

## 2019-04-29 ENCOUNTER — OFFICE VISIT (OUTPATIENT)
Dept: RHEUMATOLOGY | Facility: CLINIC | Age: 59
End: 2019-04-29
Payer: MEDICARE

## 2019-04-29 VITALS
DIASTOLIC BLOOD PRESSURE: 88 MMHG | HEART RATE: 71 BPM | SYSTOLIC BLOOD PRESSURE: 151 MMHG | BODY MASS INDEX: 34.68 KG/M2 | WEIGHT: 228.81 LBS | HEIGHT: 68 IN

## 2019-04-29 DIAGNOSIS — Z79.899 HIGH RISK MEDICATION USE: Chronic | ICD-10-CM

## 2019-04-29 DIAGNOSIS — B37.31 VAGINAL YEAST INFECTION: ICD-10-CM

## 2019-04-29 DIAGNOSIS — M05.79 RHEUMATOID ARTHRITIS INVOLVING MULTIPLE SITES WITH POSITIVE RHEUMATOID FACTOR: Primary | Chronic | ICD-10-CM

## 2019-04-29 PROBLEM — M81.0 OSTEOPOROSIS: Status: ACTIVE | Noted: 2018-12-03

## 2019-04-29 PROBLEM — I10 ESSENTIAL HYPERTENSION: Status: ACTIVE | Noted: 2018-12-03

## 2019-04-29 PROBLEM — F33.1 MODERATE EPISODE OF RECURRENT MAJOR DEPRESSIVE DISORDER: Status: ACTIVE | Noted: 2018-12-03

## 2019-04-29 PROCEDURE — 99214 OFFICE O/P EST MOD 30 MIN: CPT | Mod: S$GLB,,, | Performed by: INTERNAL MEDICINE

## 2019-04-29 PROCEDURE — 99999 PR PBB SHADOW E&M-EST. PATIENT-LVL III: CPT | Mod: PBBFAC,,, | Performed by: INTERNAL MEDICINE

## 2019-04-29 PROCEDURE — 3008F BODY MASS INDEX DOCD: CPT | Mod: CPTII,S$GLB,, | Performed by: INTERNAL MEDICINE

## 2019-04-29 PROCEDURE — 99213 OFFICE O/P EST LOW 20 MIN: CPT | Mod: PBBFAC | Performed by: INTERNAL MEDICINE

## 2019-04-29 PROCEDURE — 99999 PR PBB SHADOW E&M-EST. PATIENT-LVL III: ICD-10-PCS | Mod: PBBFAC,,, | Performed by: INTERNAL MEDICINE

## 2019-04-29 PROCEDURE — 99214 PR OFFICE/OUTPT VISIT, EST, LEVL IV, 30-39 MIN: ICD-10-PCS | Mod: S$GLB,,, | Performed by: INTERNAL MEDICINE

## 2019-04-29 PROCEDURE — 3008F PR BODY MASS INDEX (BMI) DOCUMENTED: ICD-10-PCS | Mod: CPTII,S$GLB,, | Performed by: INTERNAL MEDICINE

## 2019-04-29 RX ORDER — MELOXICAM 15 MG/1
TABLET ORAL
Qty: 90 TABLET | Refills: 3 | Status: SHIPPED | OUTPATIENT
Start: 2019-04-29 | End: 2020-02-07

## 2019-04-29 RX ORDER — HYDROXYCHLOROQUINE SULFATE 200 MG/1
200 TABLET, FILM COATED ORAL 2 TIMES DAILY
Qty: 180 TABLET | Refills: 1 | Status: SHIPPED | OUTPATIENT
Start: 2019-04-29 | End: 2019-06-10

## 2019-04-29 RX ORDER — METHOTREXATE 2.5 MG/1
15 TABLET ORAL
Qty: 78 TABLET | Refills: 0 | Status: SHIPPED | OUTPATIENT
Start: 2019-04-29 | End: 2019-07-10 | Stop reason: SDUPTHER

## 2019-04-29 RX ORDER — FLUCONAZOLE 150 MG/1
150 TABLET ORAL
Qty: 6 TABLET | Refills: 0 | Status: SHIPPED | OUTPATIENT
Start: 2019-04-29 | End: 2019-07-03 | Stop reason: SDUPTHER

## 2019-04-29 RX ORDER — CLINDAMYCIN PHOSPHATE 10 UG/ML
LOTION TOPICAL
COMMUNITY
Start: 2019-04-23 | End: 2019-06-12

## 2019-04-29 RX ORDER — FOLIC ACID 1 MG/1
1000 TABLET ORAL DAILY
Qty: 90 TABLET | Refills: 3 | Status: SHIPPED | OUTPATIENT
Start: 2019-04-29 | End: 2020-12-14 | Stop reason: SDUPTHER

## 2019-04-29 RX ORDER — METRONIDAZOLE 7.5 MG/G
1 CREAM TOPICAL
COMMUNITY
Start: 2019-03-18 | End: 2021-03-17

## 2019-04-29 RX ORDER — IVERMECTIN 10 MG/G
CREAM TOPICAL
Refills: 2 | COMMUNITY
Start: 2019-04-08 | End: 2021-03-17

## 2019-04-29 ASSESSMENT — ROUTINE ASSESSMENT OF PATIENT INDEX DATA (RAPID3)
AM STIFFNESS SCORE: 1, YES
PATIENT GLOBAL ASSESSMENT SCORE: 5
MDHAQ FUNCTION SCORE: .5
PAIN SCORE: 6
FATIGUE SCORE: 5.5
PSYCHOLOGICAL DISTRESS SCORE: 6.6
WHEN YOU AWAKENED IN THE MORNING OVER THE LAST WEEK, PLEASE INDICATE THE AMOUNT OF TIME IT TAKES UNTIL YOU ARE AS LIMBER AS YOU WILL BE FOR THE DAY: 2HRS
TOTAL RAPID3 SCORE: 4.22

## 2019-04-29 NOTE — ASSESSMENT & PLAN NOTE
Increased sx since off of MTX for one month for skin treatment  Some joint tenderness without swelling  VALDERRAMA moderate     She will restart MTX this week  Cont same regimen and cont lab q3m

## 2019-04-29 NOTE — PROGRESS NOTES
"Subjective:       Patient ID: Sherrell Yip is a 59 y.o. female.    Chief Complaint: Follow-up    Former [employer] with [employer] system   Just got approved for SSI      CCP+ RA with positive ANTONIA    MTX since Oct 2015     mg/d since April 2016 -Nov 2016 when switched to LFA  LFA added Nov 2016 then stopped 2017 due to elevated AST/ALT  Prednisone bridge 1 month, July 2015  NSAID: hx Celebrex; hx ibuprofen caused stomach ache  HCQ since  June 2017     Currently:   bid  MTX 15 mg / week and 1mg folic acid everyday  Meloxicam 15 mg when needed       Broke out with red face; derm said rosacea and Rx doxycycline and held MTX  Without MTX she notice more pain and stiffness   Now restarting MTX   Used a lot of meloxicam    Review of Systems   Constitutional: Positive for fatigue and unexpected weight change. Negative for fever.   HENT: Negative for mouth sores and trouble swallowing.         Dry mouth   Eyes: Negative for redness.        Dry eyes   Respiratory: Negative for cough and shortness of breath.    Cardiovascular: Negative for chest pain.   Gastrointestinal: Negative for abdominal pain, constipation and diarrhea.   Skin: Negative for rash.   Neurological: Negative for headaches.   Hematological: Negative for adenopathy. Does not bruise/bleed easily.   Psychiatric/Behavioral: Negative for sleep disturbance.         Objective:   BP (!) 151/88   Pulse 71   Ht 5' 8" (1.727 m)   Wt 103.8 kg (228 lb 13.4 oz)   BMI 34.79 kg/m²      Physical Exam   Constitutional: She is well-developed, well-nourished, and in no distress.   HENT:   Mouth/Throat: Oropharynx is clear and moist.   Eyes: Conjunctivae are normal.   Cardiovascular: Normal rate and regular rhythm.    Pulmonary/Chest: She has no wheezes. She has no rales.   Lymphadenopathy:     She has no cervical adenopathy.   Skin: No rash noted.     Musculoskeletal:   Not swollen but tender at wrists and L shoulder           Physical Exam     Tenderness:   RUE: " wrist  LUE: glenohumeral and wrist    VALDERRAMA-28 tender joint count: 3  VALDERRAMA-28 swollen joint count: 0  ESR (mm/hr): 9  Patient global assessment: 50  VALDERRAMA-28 score: 3.21 (Moderate Disease Activity)       Lab Results   Component Value Date    SEDRATE 9 04/17/2019 April 17 CBC and CMP ok; ALT 31     Assessment:       1. Rheumatoid arthritis involving multiple sites with positive rheumatoid factor    2. Vaginal yeast infection    3. High risk medication use            Plan:       Problem List Items Addressed This Visit        Active Problems    Rheumatoid arthritis involving multiple sites with positive rheumatoid factor - Primary (Chronic)     Increased sx since off of MTX for one month for skin treatment  Some joint tenderness without swelling  VALDERRAMA moderate     She will restart MTX this week  Cont same regimen and cont lab q3m          Relevant Medications    methotrexate 2.5 MG Tab    meloxicam (MOBIC) 15 MG tablet    folic acid (FOLVITE) 1 MG tablet    hydroxychloroquine (PLAQUENIL) 200 mg tablet    High risk medication use (Chronic)     Borderline ALT but no other medication AE           Vaginal yeast infection    Relevant Medications    fluconazole (DIFLUCAN) 150 MG Tab        Lab in 3 mo (early Aug)  RTC 4 mo

## 2019-05-01 ENCOUNTER — TELEPHONE (OUTPATIENT)
Dept: RHEUMATOLOGY | Facility: CLINIC | Age: 59
End: 2019-05-01

## 2019-05-01 DIAGNOSIS — Z79.899 HIGH RISK MEDICATION USE: Primary | Chronic | ICD-10-CM

## 2019-05-01 DIAGNOSIS — M05.79 RHEUMATOID ARTHRITIS INVOLVING MULTIPLE SITES WITH POSITIVE RHEUMATOID FACTOR: Chronic | ICD-10-CM

## 2019-05-01 NOTE — TELEPHONE ENCOUNTER
----- Message from Maru Johnson RN sent at 4/29/2019 10:36 AM CDT -----  Please send labs to [facility]

## 2019-05-03 ENCOUNTER — TELEPHONE (OUTPATIENT)
Dept: RHEUMATOLOGY | Facility: HOSPITAL | Age: 59
End: 2019-05-03

## 2019-05-03 NOTE — TELEPHONE ENCOUNTER
"Having trouble getting mtx from [facility] because of meloxicam interaction.    Phone #: [phone]  Order #: [id number]    I called [facility] and they reported that the interaction had been "resolved already"          "

## 2019-05-03 NOTE — TELEPHONE ENCOUNTER
----- Message from Alba Blandon MA sent at 5/3/2019 11:41 AM CDT -----  Contact: Self      ----- Message -----  From: Flory Bridges  Sent: 5/3/2019  11:29 AM  To: Dewayne Kraus    Pt is needing a call back regarding methotrexate 2.5 MG Tab  Pharm will not refill until they get an ok from dr proctor to refill  Pharm # [phone] Order# [id number]  Pt is also needing a call back @#[phone]

## 2019-06-09 DIAGNOSIS — M05.79 RHEUMATOID ARTHRITIS INVOLVING MULTIPLE SITES WITH POSITIVE RHEUMATOID FACTOR: Chronic | ICD-10-CM

## 2019-06-10 RX ORDER — HYDROXYCHLOROQUINE SULFATE 200 MG/1
TABLET, FILM COATED ORAL
Qty: 180 TABLET | Refills: 1 | Status: SHIPPED | OUTPATIENT
Start: 2019-06-10 | End: 2019-08-19 | Stop reason: SDUPTHER

## 2019-07-01 DIAGNOSIS — B37.31 VAGINAL YEAST INFECTION: ICD-10-CM

## 2019-07-01 RX ORDER — FLUCONAZOLE 150 MG/1
150 TABLET ORAL
Qty: 6 TABLET | Refills: 0 | OUTPATIENT
Start: 2019-07-01 | End: 2019-07-04

## 2019-07-03 DIAGNOSIS — B37.31 VAGINAL YEAST INFECTION: ICD-10-CM

## 2019-07-03 RX ORDER — FLUCONAZOLE 150 MG/1
150 TABLET ORAL
Qty: 6 TABLET | Refills: 0 | Status: SHIPPED | OUTPATIENT
Start: 2019-07-03 | End: 2019-08-19 | Stop reason: SDUPTHER

## 2019-07-10 DIAGNOSIS — M05.79 RHEUMATOID ARTHRITIS INVOLVING MULTIPLE SITES WITH POSITIVE RHEUMATOID FACTOR: Chronic | ICD-10-CM

## 2019-07-11 RX ORDER — METHOTREXATE 2.5 MG/1
15 TABLET ORAL
Qty: 78 TABLET | Refills: 0 | Status: SHIPPED | OUTPATIENT
Start: 2019-07-11 | End: 2019-08-19 | Stop reason: SDUPTHER

## 2019-07-18 ENCOUNTER — TELEPHONE (OUTPATIENT)
Dept: RHEUMATOLOGY | Facility: CLINIC | Age: 59
End: 2019-07-18

## 2019-07-18 DIAGNOSIS — Z79.899 HIGH RISK MEDICATION USE: Primary | Chronic | ICD-10-CM

## 2019-07-18 NOTE — TELEPHONE ENCOUNTER
----- Message from Alba Blandon MA sent at 7/18/2019  2:20 PM CDT -----  Contact: Self  Can you please put in lab order for the MTX before I call the pt to let her know she needs labs done before the medication can be filled    Alba  ----- Message -----  From: Flory Bridges  Sent: 7/18/2019   1:50 PM  To: Dewayne RUIZ Staff    Pt is calling stating that she has not received methotrexate 2.5 MG Tab from pharmacy   Pt can be reached @[phone]  [facility] is the pharmacy she is wanting using

## 2019-07-29 DIAGNOSIS — C50.911 MALIGNANT NEOPLASM OF RIGHT FEMALE BREAST, UNSPECIFIED ESTROGEN RECEPTOR STATUS, UNSPECIFIED SITE OF BREAST: Primary | ICD-10-CM

## 2019-08-07 ENCOUNTER — HOSPITAL ENCOUNTER (OUTPATIENT)
Dept: RADIOLOGY | Facility: HOSPITAL | Age: 59
Discharge: HOME | End: 2019-08-07
Attending: SURGERY
Payer: MEDICARE

## 2019-08-07 VITALS — WEIGHT: 228 LBS | BODY MASS INDEX: 34.56 KG/M2 | HEIGHT: 68 IN

## 2019-08-07 DIAGNOSIS — C50.911 MALIGNANT NEOPLASM OF RIGHT FEMALE BREAST, UNSPECIFIED ESTROGEN RECEPTOR STATUS, UNSPECIFIED SITE OF BREAST: ICD-10-CM

## 2019-08-07 PROCEDURE — 77065 DX MAMMO INCL CAD UNI: CPT | Mod: 26,LT,, | Performed by: RADIOLOGY

## 2019-08-07 PROCEDURE — 77061 BREAST TOMOSYNTHESIS UNI: CPT | Mod: TC,LT

## 2019-08-07 PROCEDURE — 77065 DX MAMMO INCL CAD UNI: CPT | Mod: TC,LT

## 2019-08-07 PROCEDURE — 77065 MAMMO DIGITAL DIAGNOSTIC LEFT WITH TOMOSYNTHESIS_CAD: ICD-10-PCS | Mod: 26,LT,, | Performed by: RADIOLOGY

## 2019-08-07 PROCEDURE — 77061 BREAST TOMOSYNTHESIS UNI: CPT | Mod: 26,,, | Performed by: RADIOLOGY

## 2019-08-07 PROCEDURE — 77061 MAMMO DIGITAL DIAGNOSTIC LEFT WITH TOMOSYNTHESIS_CAD: ICD-10-PCS | Mod: 26,,, | Performed by: RADIOLOGY

## 2019-08-12 ENCOUNTER — OFFICE VISIT (OUTPATIENT)
Dept: SURGERY | Facility: CLINIC | Age: 59
End: 2019-08-12
Payer: MEDICARE

## 2019-08-12 VITALS
RESPIRATION RATE: 99 BRPM | WEIGHT: 218.06 LBS | HEIGHT: 68 IN | HEART RATE: 68 BPM | SYSTOLIC BLOOD PRESSURE: 131 MMHG | DIASTOLIC BLOOD PRESSURE: 85 MMHG | BODY MASS INDEX: 33.05 KG/M2

## 2019-08-12 DIAGNOSIS — Z85.3 HISTORY OF BREAST CANCER: Primary | ICD-10-CM

## 2019-08-12 PROCEDURE — 99212 PR OFFICE/OUTPT VISIT, EST, LEVL II, 10-19 MIN: ICD-10-PCS | Mod: S$GLB,,, | Performed by: SURGERY

## 2019-08-12 PROCEDURE — 99999 PR PBB SHADOW E&M-EST. PATIENT-LVL III: ICD-10-PCS | Mod: PBBFAC,,, | Performed by: SURGERY

## 2019-08-12 PROCEDURE — 99212 OFFICE O/P EST SF 10 MIN: CPT | Mod: S$GLB,,, | Performed by: SURGERY

## 2019-08-12 PROCEDURE — 99999 PR PBB SHADOW E&M-EST. PATIENT-LVL III: CPT | Mod: PBBFAC,,, | Performed by: SURGERY

## 2019-08-12 NOTE — PROGRESS NOTES
Subjective:       Patient ID: Sherrell Yip is a 59 y.o. female.    Chief Complaint: Follow-up    59-year-old woman with a history of a right-sided mastectomy for early stage breast cancer.  This was done in 2010 she is now almost 10 years out.  She presents for surveillance and is without complaints.  She had a recent mammogram which was read as a BI-RADS 1    Review of Systems   Constitutional: Negative for chills and unexpected weight change.   HENT: Negative for congestion, ear pain and sore throat.    Eyes: Negative for pain and redness.   Respiratory: Negative for cough and shortness of breath.    Cardiovascular: Negative for chest pain and palpitations.   Gastrointestinal: Negative for abdominal distention, abdominal pain and constipation.   Endocrine: Negative for cold intolerance and heat intolerance.   Genitourinary: Negative for dysuria and frequency.   Musculoskeletal: Negative for back pain and neck pain.   Skin: Negative for pallor and rash.   Neurological: Negative for syncope, light-headedness and headaches.   Hematological: Negative for adenopathy. Does not bruise/bleed easily.   Psychiatric/Behavioral: Negative for confusion and hallucinations.       Objective:    [place] exam: Bilaterally without skin changes (other than healed surgical scars), adenopathy, or palpable lesions.   Physical Exam    Assessment:       1. History of breast cancer        Plan:       Doing well, continue annual surveillence,    annual mammograms

## 2019-08-19 ENCOUNTER — OFFICE VISIT (OUTPATIENT)
Dept: RHEUMATOLOGY | Facility: CLINIC | Age: 59
End: 2019-08-19
Payer: MEDICARE

## 2019-08-19 VITALS
HEART RATE: 61 BPM | SYSTOLIC BLOOD PRESSURE: 137 MMHG | TEMPERATURE: 98 F | BODY MASS INDEX: 33.78 KG/M2 | WEIGHT: 222.88 LBS | HEIGHT: 68 IN | DIASTOLIC BLOOD PRESSURE: 80 MMHG

## 2019-08-19 DIAGNOSIS — Z79.899 HIGH RISK MEDICATION USE: Chronic | ICD-10-CM

## 2019-08-19 DIAGNOSIS — B37.31 VAGINAL YEAST INFECTION: ICD-10-CM

## 2019-08-19 DIAGNOSIS — R53.82 CHRONIC FATIGUE: ICD-10-CM

## 2019-08-19 DIAGNOSIS — M05.79 RHEUMATOID ARTHRITIS INVOLVING MULTIPLE SITES WITH POSITIVE RHEUMATOID FACTOR: Primary | Chronic | ICD-10-CM

## 2019-08-19 DIAGNOSIS — R76.8 ANA POSITIVE: ICD-10-CM

## 2019-08-19 PROCEDURE — 99214 OFFICE O/P EST MOD 30 MIN: CPT | Mod: S$GLB,,, | Performed by: INTERNAL MEDICINE

## 2019-08-19 PROCEDURE — 3008F BODY MASS INDEX DOCD: CPT | Mod: CPTII,S$GLB,, | Performed by: INTERNAL MEDICINE

## 2019-08-19 PROCEDURE — 99999 PR PBB SHADOW E&M-EST. PATIENT-LVL III: ICD-10-PCS | Mod: PBBFAC,,, | Performed by: INTERNAL MEDICINE

## 2019-08-19 PROCEDURE — 99214 PR OFFICE/OUTPT VISIT, EST, LEVL IV, 30-39 MIN: ICD-10-PCS | Mod: S$GLB,,, | Performed by: INTERNAL MEDICINE

## 2019-08-19 PROCEDURE — 99999 PR PBB SHADOW E&M-EST. PATIENT-LVL III: CPT | Mod: PBBFAC,,, | Performed by: INTERNAL MEDICINE

## 2019-08-19 PROCEDURE — 3008F PR BODY MASS INDEX (BMI) DOCUMENTED: ICD-10-PCS | Mod: CPTII,S$GLB,, | Performed by: INTERNAL MEDICINE

## 2019-08-19 RX ORDER — FLUCONAZOLE 150 MG/1
150 TABLET ORAL
Qty: 6 TABLET | Refills: 0 | Status: SHIPPED | OUTPATIENT
Start: 2019-08-19 | End: 2019-10-22 | Stop reason: SDUPTHER

## 2019-08-19 RX ORDER — METHOTREXATE 2.5 MG/1
15 TABLET ORAL
Qty: 78 TABLET | Refills: 0 | Status: SHIPPED | OUTPATIENT
Start: 2019-08-19 | End: 2020-01-30 | Stop reason: SDUPTHER

## 2019-08-19 RX ORDER — HYDROXYCHLOROQUINE SULFATE 200 MG/1
200 TABLET, FILM COATED ORAL 2 TIMES DAILY
Qty: 180 TABLET | Refills: 1 | Status: SHIPPED | OUTPATIENT
Start: 2019-08-19 | End: 2020-03-23

## 2019-08-19 ASSESSMENT — ROUTINE ASSESSMENT OF PATIENT INDEX DATA (RAPID3)
MDHAQ FUNCTION SCORE: .6
FATIGUE SCORE: 7
PAIN SCORE: 5
AM STIFFNESS SCORE: 1, YES
WHEN YOU AWAKENED IN THE MORNING OVER THE LAST WEEK, PLEASE INDICATE THE AMOUNT OF TIME IT TAKES UNTIL YOU ARE AS LIMBER AS YOU WILL BE FOR THE DAY: 1HR
PATIENT GLOBAL ASSESSMENT SCORE: 5.5
TOTAL RAPID3 SCORE: 4.17
PSYCHOLOGICAL DISTRESS SCORE: 4.4

## 2019-08-19 NOTE — ASSESSMENT & PLAN NOTE
RA with disease control on MTX+HCQ but still subjective sx including morning stiffness and fatigue    For now will continue current meds and cont lab monitoring  RTC me in January

## 2019-08-19 NOTE — ASSESSMENT & PLAN NOTE
Has had yeast requiring diflucan  ;  Lab ok July  Went to eye doctor recently    Get labs again in Oct and January

## 2019-08-19 NOTE — PROGRESS NOTES
"Subjective:       Patient ID: Sherrell Yip is a 59 y.o. female.    Chief Complaint: Disease Management    Former [employer] with [employer]   Just got approved for SSI      CCP+ RA with positive ANTONIA    MTX since Oct 2015     mg/d since April 2016 -Nov 2016 when switched to LFA  LFA added Nov 2016 then stopped 2017 due to elevated AST/ALT  Prednisone bridge 1 month, July 2015  NSAID: hx Celebrex; hx ibuprofen caused stomach ache  HCQ since  June 2017     Currently:   bid  MTX 15 mg / week and 1mg folic acid everyday  Meloxicam 15 mg when needed    Hx breast cancer 2010     One hour morning stiffness  Arm pain   Swelling some days  Pretty active with grandchildren  Ok on feet but knees more stiff    Recent "cold" in the family; had some fever    Review of Systems   Constitutional: Positive for fatigue and unexpected weight change. Negative for fever.   HENT: Negative for mouth sores and trouble swallowing.          Dry mouth   Eyes: Negative for redness.         Dry eyes   Respiratory: Negative for cough and shortness of breath.    Cardiovascular: Negative for chest pain.   Gastrointestinal: Negative for abdominal pain, constipation and diarrhea.   Genitourinary: Negative for dysuria and genital sores.   Skin: Negative for rash.   Neurological: Negative for headaches.   Hematological: Negative for adenopathy. Does not bruise/bleed easily.   Psychiatric/Behavioral: Negative for sleep disturbance.         Objective:   /80   Pulse 61   Temp 98.2 °F (36.8 °C)   Ht 5' 8" (1.727 m)   Wt 101.1 kg (222 lb 14.2 oz)   BMI 33.89 kg/m²      Physical Exam   Constitutional: She is well-developed, well-nourished, and in no distress.   HENT:   Mouth/Throat: Oropharynx is clear and moist.   Eyes: Conjunctivae are normal.   Cardiovascular: Normal rate and regular rhythm.    Pulmonary/Chest: She has no wheezes. She has no rales.   Lymphadenopathy:     She has no cervical adenopathy.   Skin: No rash noted.      "     Physical Exam     VALDERRAMA-28 tender joint count: 0  VALDERRAMA-28 swollen joint count: 0  ESR (mm/hr): 9  Patient global assessment: 55  VALDERRAMA-28 score: 2.31 (Remission)    Lab Results   Component Value Date    SEDRATE 9 07/29/2019     Assessment:       1. Rheumatoid arthritis involving multiple sites with positive rheumatoid factor    2. Vaginal yeast infection    3. High risk medication use    4. ANTONIA positive:     5. Chronic fatigue        Plan:       Problem List Items Addressed This Visit        Active Problems    Rheumatoid arthritis involving multiple sites with positive rheumatoid factor - Primary (Chronic)     RA with disease control on MTX+HCQ but still subjective sx including morning stiffness and fatigue    For now will continue current meds and cont lab monitoring  RTC me in January         Relevant Medications    methotrexate 2.5 MG Tab    hydroxychloroquine (PLAQUENIL) 200 mg tablet    High risk medication use (Chronic)     Has had yeast requiring diflucan  ;  Lab ok July  Went to eye doctor recently    Get labs again in Oct and January         ANTONIA positive:      No new sx         Fatigue     persistent         Vaginal yeast infection    Relevant Medications    fluconazole (DIFLUCAN) 150 MG Tab

## 2019-10-22 DIAGNOSIS — B37.31 VAGINAL YEAST INFECTION: ICD-10-CM

## 2019-10-22 RX ORDER — FLUCONAZOLE 150 MG/1
150 TABLET ORAL
Qty: 6 TABLET | Refills: 0 | Status: SHIPPED | OUTPATIENT
Start: 2019-10-22 | End: 2019-12-19

## 2019-10-23 ENCOUNTER — PATIENT MESSAGE (OUTPATIENT)
Dept: RHEUMATOLOGY | Facility: CLINIC | Age: 59
End: 2019-10-23

## 2019-10-23 LAB
ALBUMIN SERPL-MCNC: 4.5 G/DL (ref 3.6–5.1)
ALBUMIN/GLOB SERPL: 1.9 (CALC) (ref 1–2.5)
ALP SERPL-CCNC: 81 U/L (ref 33–130)
ALT SERPL-CCNC: 22 U/L (ref 6–29)
AST SERPL-CCNC: 18 U/L (ref 10–35)
BASOPHILS # BLD AUTO: 49 CELLS/UL (ref 0–200)
BASOPHILS NFR BLD AUTO: 1.3 %
BILIRUB SERPL-MCNC: 0.4 MG/DL (ref 0.2–1.2)
BUN SERPL-MCNC: 9 MG/DL (ref 7–25)
BUN/CREAT SERPL: NORMAL (CALC) (ref 6–22)
CALCIUM SERPL-MCNC: 9.4 MG/DL (ref 8.6–10.4)
CHLORIDE SERPL-SCNC: 103 MMOL/L (ref 98–110)
CO2 SERPL-SCNC: 29 MMOL/L (ref 20–32)
CREAT SERPL-MCNC: 0.79 MG/DL (ref 0.5–1.05)
EOSINOPHIL # BLD AUTO: 80 CELLS/UL (ref 15–500)
EOSINOPHIL NFR BLD AUTO: 2.1 %
ERYTHROCYTE [DISTWIDTH] IN BLOOD BY AUTOMATED COUNT: 13 % (ref 11–15)
ERYTHROCYTE [SEDIMENTATION RATE] IN BLOOD BY WESTERGREN METHOD: 9 MM/H
GFRSERPLBLD MDRD-ARVRAT: 83 ML/MIN/1.73M2
GLOBULIN SER CALC-MCNC: 2.4 G/DL (CALC) (ref 1.9–3.7)
GLUCOSE SERPL-MCNC: 80 MG/DL (ref 65–139)
HCT VFR BLD AUTO: 39.2 % (ref 35–45)
HGB BLD-MCNC: 13.5 G/DL (ref 11.7–15.5)
LYMPHOCYTES # BLD AUTO: 897 CELLS/UL (ref 850–3900)
LYMPHOCYTES NFR BLD AUTO: 23.6 %
MCH RBC QN AUTO: 32.5 PG (ref 27–33)
MCHC RBC AUTO-ENTMCNC: 34.4 G/DL (ref 32–36)
MCV RBC AUTO: 94.2 FL (ref 80–100)
MONOCYTES # BLD AUTO: 456 CELLS/UL (ref 200–950)
MONOCYTES NFR BLD AUTO: 12 %
NEUTROPHILS # BLD AUTO: 2318 CELLS/UL (ref 1500–7800)
NEUTROPHILS NFR BLD AUTO: 61 %
PLATELET # BLD AUTO: 286 THOUSAND/UL (ref 140–400)
PMV BLD REES-ECKER: 9.7 FL (ref 7.5–12.5)
POTASSIUM SERPL-SCNC: 4.1 MMOL/L (ref 3.5–5.3)
PROT SERPL-MCNC: 6.9 G/DL (ref 6.1–8.1)
RBC # BLD AUTO: 4.16 MILLION/UL (ref 3.8–5.1)
SODIUM SERPL-SCNC: 141 MMOL/L (ref 135–146)
WBC # BLD AUTO: 3.8 THOUSAND/UL (ref 3.8–10.8)

## 2019-12-09 DIAGNOSIS — B37.31 VAGINAL YEAST INFECTION: ICD-10-CM

## 2019-12-09 RX ORDER — FLUCONAZOLE 150 MG/1
150 TABLET ORAL
Qty: 6 TABLET | Refills: 0 | OUTPATIENT
Start: 2019-12-09 | End: 2019-12-12

## 2019-12-19 DIAGNOSIS — B37.31 VAGINAL YEAST INFECTION: ICD-10-CM

## 2019-12-19 RX ORDER — FLUCONAZOLE 150 MG/1
150 TABLET ORAL
Qty: 6 TABLET | Refills: 0 | Status: SHIPPED | OUTPATIENT
Start: 2019-12-19 | End: 2020-02-07 | Stop reason: SDUPTHER

## 2020-01-08 ENCOUNTER — TELEPHONE (OUTPATIENT)
Dept: RHEUMATOLOGY | Facility: CLINIC | Age: 60
End: 2020-01-08

## 2020-01-08 NOTE — TELEPHONE ENCOUNTER
Called to inform patient of the need to reschedule 1/31/20 appointment with Dr. Buchanan, due to a change in the provider's schedule.  Patient verbalizes understanding.  Appointment rescheduled to 2/7/20.

## 2020-01-30 DIAGNOSIS — M05.79 RHEUMATOID ARTHRITIS INVOLVING MULTIPLE SITES WITH POSITIVE RHEUMATOID FACTOR: Chronic | ICD-10-CM

## 2020-01-30 DIAGNOSIS — B37.31 VAGINAL YEAST INFECTION: ICD-10-CM

## 2020-01-30 RX ORDER — METHOTREXATE 2.5 MG/1
15 TABLET ORAL
Qty: 78 TABLET | Refills: 0 | Status: SHIPPED | OUTPATIENT
Start: 2020-01-30 | End: 2020-03-23

## 2020-01-31 RX ORDER — FLUCONAZOLE 150 MG/1
150 TABLET ORAL
Qty: 6 TABLET | Refills: 0 | OUTPATIENT
Start: 2020-01-31 | End: 2020-02-03

## 2020-02-07 ENCOUNTER — OFFICE VISIT (OUTPATIENT)
Dept: RHEUMATOLOGY | Facility: CLINIC | Age: 60
End: 2020-02-07
Payer: MEDICARE

## 2020-02-07 VITALS
BODY MASS INDEX: 34.28 KG/M2 | SYSTOLIC BLOOD PRESSURE: 184 MMHG | DIASTOLIC BLOOD PRESSURE: 95 MMHG | HEIGHT: 68 IN | WEIGHT: 226.19 LBS | HEART RATE: 67 BPM

## 2020-02-07 DIAGNOSIS — M05.79 RHEUMATOID ARTHRITIS INVOLVING MULTIPLE SITES WITH POSITIVE RHEUMATOID FACTOR: Primary | Chronic | ICD-10-CM

## 2020-02-07 DIAGNOSIS — Z79.899 HIGH RISK MEDICATION USE: Chronic | ICD-10-CM

## 2020-02-07 DIAGNOSIS — B37.31 VAGINAL YEAST INFECTION: ICD-10-CM

## 2020-02-07 DIAGNOSIS — I10 ESSENTIAL HYPERTENSION: ICD-10-CM

## 2020-02-07 PROCEDURE — 3080F PR MOST RECENT DIASTOLIC BLOOD PRESSURE >= 90 MM HG: ICD-10-PCS | Mod: CPTII,S$GLB,, | Performed by: INTERNAL MEDICINE

## 2020-02-07 PROCEDURE — 99999 PR PBB SHADOW E&M-EST. PATIENT-LVL III: ICD-10-PCS | Mod: PBBFAC,,, | Performed by: INTERNAL MEDICINE

## 2020-02-07 PROCEDURE — 3077F SYST BP >= 140 MM HG: CPT | Mod: CPTII,S$GLB,, | Performed by: INTERNAL MEDICINE

## 2020-02-07 PROCEDURE — 3008F PR BODY MASS INDEX (BMI) DOCUMENTED: ICD-10-PCS | Mod: CPTII,S$GLB,, | Performed by: INTERNAL MEDICINE

## 2020-02-07 PROCEDURE — 99214 PR OFFICE/OUTPT VISIT, EST, LEVL IV, 30-39 MIN: ICD-10-PCS | Mod: S$GLB,,, | Performed by: INTERNAL MEDICINE

## 2020-02-07 PROCEDURE — 99999 PR PBB SHADOW E&M-EST. PATIENT-LVL III: CPT | Mod: PBBFAC,,, | Performed by: INTERNAL MEDICINE

## 2020-02-07 PROCEDURE — 99214 OFFICE O/P EST MOD 30 MIN: CPT | Mod: S$GLB,,, | Performed by: INTERNAL MEDICINE

## 2020-02-07 PROCEDURE — 3080F DIAST BP >= 90 MM HG: CPT | Mod: CPTII,S$GLB,, | Performed by: INTERNAL MEDICINE

## 2020-02-07 PROCEDURE — 3008F BODY MASS INDEX DOCD: CPT | Mod: CPTII,S$GLB,, | Performed by: INTERNAL MEDICINE

## 2020-02-07 PROCEDURE — 3077F PR MOST RECENT SYSTOLIC BLOOD PRESSURE >= 140 MM HG: ICD-10-PCS | Mod: CPTII,S$GLB,, | Performed by: INTERNAL MEDICINE

## 2020-02-07 RX ORDER — FLUCONAZOLE 150 MG/1
150 TABLET ORAL
Qty: 6 TABLET | Refills: 0 | Status: SHIPPED | OUTPATIENT
Start: 2020-02-07 | End: 2020-04-29

## 2020-02-07 RX ORDER — CELECOXIB 200 MG/1
200 CAPSULE ORAL DAILY
COMMUNITY
End: 2021-04-15 | Stop reason: SDUPTHER

## 2020-02-07 ASSESSMENT — ROUTINE ASSESSMENT OF PATIENT INDEX DATA (RAPID3)
PSYCHOLOGICAL DISTRESS SCORE: 6.6
FATIGUE SCORE: 6
PATIENT GLOBAL ASSESSMENT SCORE: 6.5
WHEN YOU AWAKENED IN THE MORNING OVER THE LAST WEEK, PLEASE INDICATE THE AMOUNT OF TIME IT TAKES UNTIL YOU ARE AS LIMBER AS YOU WILL BE FOR THE DAY: ALL DAY
MDHAQ FUNCTION SCORE: 1.5
TOTAL RAPID3 SCORE: 7.16
AM STIFFNESS SCORE: 1, YES
PAIN SCORE: 10

## 2020-02-07 NOTE — ASSESSMENT & PLAN NOTE
VALDERRAMA is good on MTX plus HCQ    PE does not indicate knee synovitis; knee pain is more likely OA , aggravated by weight gain    Rec:  Cont Celebrex   Cont MTX and HCQ  F/u with ortho for Orthovisc injections and PT for knees    RTC me 4 mo

## 2020-02-07 NOTE — PROGRESS NOTES
"Subjective:       Patient ID: Sherrell Yip is a 60 y.o. female.    Chief Complaint: Disease Management    HPI     Former [facility] with [employer]   Just got approved for SSI      CCP+ RA with positive ANTONIA    MTX since Oct 2015     mg/d since April 2016 -Nov 2016 when switched to LFA  LFA added Nov 2016 then stopped 2017 due to elevated AST/ALT  Prednisone bridge 1 month, July 2015  NSAID: hx Celebrex; hx ibuprofen caused stomach ache  HCQ since  June 2017     Currently:   bid  MTX 15 mg / week and 1mg folic acid everyday  Meloxicam 15 mg when needed     Hx breast cancer 2010     R knee pain ; ortho gave shot about a month ago; next plans orthovisc  Now also L knee pain   By end of day knees hurt  Hurts to stand or walk  Feels better overnight in bed  Rising hurts  They are swollen  XRay at [facility]; said to show arthritis in both, worse on R    Review of Systems   Constitutional: Positive for fatigue and unexpected weight change. Negative for fever.   HENT: Negative for mouth sores and trouble swallowing.         Dry mouth   Eyes: Negative for redness.        Dry eyes   Respiratory: Negative for cough and shortness of breath.    Cardiovascular: Negative for chest pain.   Gastrointestinal: Negative for abdominal pain, constipation and diarrhea.   Skin: Negative for rash.   Neurological: Negative for headaches.   Hematological: Negative for adenopathy. Does not bruise/bleed easily.   Psychiatric/Behavioral: Negative for sleep disturbance.         Objective:   BP (!) 184/95 (BP Location: Left arm, Patient Position: Sitting, BP Method: Large (Automatic))   Pulse 67   Ht 5' 7.5" (1.715 m)   Wt 102.6 kg (226 lb 3.1 oz)   BMI 34.90 kg/m²      Physical Exam   Constitutional: She is well-developed, well-nourished, and in no distress.   HENT:   Mouth/Throat: Oropharynx is clear and moist.   Eyes: Conjunctivae are normal.   Cardiovascular: Normal rate and regular rhythm.    Pulmonary/Chest: She has no wheezes. " She has no rales.   Lymphadenopathy:     She has no cervical adenopathy.   Skin: No rash noted.     Musculoskeletal:   Knees are crepitant and tender but no effusion or instability           2/7/2020   Tender (VALDERRAMA-28) 2 / 28    Swollen (VALDERRAMA-28) 0 / 28    Provider Global Assessment 25 (mm)   Patient Global Assessment 65 (mm)   ESR 9 (mm/hr)   CRP Not documented   VALDERRAMA-28 (ESR) 3.24 (Moderate disease activity)   VALDERRAMA-28 (CRP) Not documented     Lab Results   Component Value Date    SEDRATE 9 01/14/2020          Assessment:       1. Rheumatoid arthritis involving multiple sites with positive rheumatoid factor    2. High risk medication use    3. Vaginal yeast infection    4. Essential hypertension            Plan:       Problem List Items Addressed This Visit        Active Problems    Rheumatoid arthritis involving multiple sites with positive rheumatoid factor - Primary (Chronic)     VALDERRAMA is good on MTX plus HCQ    PE does not indicate knee synovitis; knee pain is more likely OA , aggravated by weight gain    Rec:  Cont Celebrex   Cont MTX and HCQ  F/u with ortho for Orthovisc injections and PT for knees    RTC me 4 mo             High risk medication use (Chronic)     Lab ok on nsaid plus mtx  Will cont q3m lab for mtx monitoring         Vaginal yeast infection    Relevant Medications    fluconazole (DIFLUCAN) 150 MG Tab    Essential hypertension     She will F/u with Dr Flores re: elevated BP

## 2020-03-03 NOTE — PROGRESS NOTES
Chief Complaint :   Breast Cancer.    Hx of Present illness :  Patient is a 60 y.o. year old female who presents to the clinic today for  Oncology followup. Dx'd to have invasive carcinoma involving right breast in . Underwent right mastectomy with reconstruction.. Grade 1, ER +; OR +, HER- 2 Neg; Node negattive. Stage 1. Low risk of recurrence on Oncotype Dx. Completed five years of Femara therapy. Routine Annual visit. .  Since last exam, has not had any surgery or new Rx. Under care of rheumatologist,  For management of rheumatoid arthritis;  about a month ago had arthrovis injection right Knee. Has had steroid Injection Left knee    Allergies :    Review of patient's allergies indicates:   Allergen Reactions    Penicillins     Sulfa (sulfonamide antibiotics)     Flagyl [metronidazole hcl]        Occupation :  Homaker. Retired     Transfusion :  None    Menstrual & obstetric Hx :  3; Para 3.  Age of menarche: 13  Age of first pregnancy: 22  Lactation history:  Used Breast Pump  Age of menopause:  Hysterectomy at age 40 because of Heavy bleeding related to fifroids  HRT: premarin for 8 years    Present Meds :   Medication List with Changes/Refills   Current Medications    ALENDRONATE (FOSAMAX) 35 MG TABLET    Take 1 tablet (35 mg total) by mouth once a week.    CELECOXIB (CELEBREX) 200 MG CAPSULE    Take 200 mg by mouth once daily.    DICLOFENAC SODIUM 1 % GEL        DULOXETINE (CYMBALTA) 60 MG CAPSULE        FLUCONAZOLE (DIFLUCAN) 150 MG TAB    Take 1 tablet (150 mg total) by mouth Every 3 (three) days. for 3 days    FOLBIC 2.5-25-2 MG TAB    Take 1 tablet by mouth once daily.    FOLIC ACID (FOLVITE) 1 MG TABLET    Take 1 tablet (1,000 mcg total) by mouth once daily.    FUROSEMIDE (LASIX) 20 MG TABLET        HYDROXYCHLOROQUINE (PLAQUENIL) 200 MG TABLET    Take 1 tablet (200 mg total) by mouth 2 (two) times daily.    METHOTREXATE 2.5 MG TAB    Take 6 tablets (15 mg total) by mouth  every 7 days.    METOPROLOL SUCCINATE (TOPROL-XL) 50 MG 24 HR TABLET    Take 50 mg by mouth once daily.    METRONIDAZOLE 0.75% (METROCREAM) 0.75 % CREA    Apply 1 application topically.    POTASSIUM CHLORIDE (MICRO-K) 10 MEQ CPSR    Take 10 mEq by mouth once daily.    SOOLANTRA 1 % CREA    1 APPLICATION APPLY ON THE SKIN ONCE TO TWICE DAILY    SYNTHROID 125 MCG TABLET    TAKE 1 TABLET BY MOUTH EVERY DAY IN PLACE OF 112MCG    TRIAMCINOLONE ACETONIDE 0.1% (KENALOG) 0.1 % CREAM    Apply topically 2 (two) times daily as needed.       Past Medical Hx :  Carcinoma Right Breast; Hypothyroidism, Rheumatoid arthritis, Hypertension. No Hx of PUD, Hepatitis, Liver disease, TB, Sarcoid, Lupus, CVA, seizure disorder, DVT, PE> No past hx of Chemotherapy or radiation therapy    Past Medical Hx :  Past Medical History:   Diagnosis Date    Abnormal Pap smear     Breast CA     Dry mouth     Hypertension     Hypothyroidism     PONV (postoperative nausea and vomiting)     RA (rheumatoid arthritis)     Rheumatoid arteritis     SVT (supraventricular tachycardia)     Usual hyperplasia of lactiferous duct        Travel Hx :  N/A    Immunization :  Immunization History   Administered Date(s) Administered    Influenza 11/16/2012, 09/27/2013, 10/09/2019    Influenza - High Dose - PF (65 years and older) 12/19/2014, 10/30/2015    Influenza - Quadrivalent - PF (6 months and older) 12/05/2018    Influenza - Trivalent (ADULT) 12/22/2016, 11/02/2017       Family Hx :  Family History   Problem Relation Age of Onset    Alzheimer's disease Mother     Lupus Father     Parkinsonism Father     Lupus Daughter     Cancer Cousin         lymphoma    Breast cancer Neg Hx     Colon cancer Neg Hx     Ovarian cancer Neg Hx        Social Hx :  Social History     Socioeconomic History    Marital status:      Spouse name: Not on file    Number of children: Not on file    Years of education: Not on file    Highest education level:  Not on file   Occupational History    Not on file   Social Needs    Financial resource strain: Not on file    Food insecurity:     Worry: Not on file     Inability: Not on file    Transportation needs:     Medical: Not on file     Non-medical: Not on file   Tobacco Use    Smoking status: Never Smoker    Smokeless tobacco: Never Used   Substance and Sexual Activity    Alcohol use: No    Drug use: No    Sexual activity: Not Currently   Lifestyle    Physical activity:     Days per week: Not on file     Minutes per session: Not on file    Stress: Not on file   Relationships    Social connections:     Talks on phone: Not on file     Gets together: Not on file     Attends Worship service: Not on file     Active member of club or organization: Not on file     Attends meetings of clubs or organizations: Not on file     Relationship status: Not on file   Other Topics Concern    Not on file   Social History Narrative    Not on file       Surgery : LENY with BSO; Breast Bx; Right mastectomy with recontruction. Partial Thyroidectomy. Colonoscopy    Symptoms :    Review of Systems   Constitutional: Positive for malaise/fatigue. Negative for chills, diaphoresis, fever and weight loss.        Good appetite.   HENT: Positive for congestion. Negative for hearing loss, nosebleeds, sinus pain and sore throat.    Eyes: Negative for blurred vision, double vision and photophobia.   Respiratory: Negative for cough, hemoptysis, sputum production, shortness of breath and wheezing.         Few weeks ago went to Urgent care for respiratory infection   Cardiovascular: Positive for leg swelling. Negative for chest pain, palpitations, orthopnea, claudication and PND.   Gastrointestinal: Negative for abdominal pain, blood in stool, constipation, diarrhea, heartburn, nausea and vomiting.   Genitourinary: Positive for frequency. Negative for dysuria, flank pain, hematuria and urgency.   Musculoskeletal: Positive for joint pain.  Negative for back pain, falls, myalgias and neck pain.   Skin: Negative for itching and rash.   Neurological: Positive for headaches (Occasional.). Negative for dizziness, tingling, tremors, sensory change, speech change and weakness.   Endo/Heme/Allergies: Negative for polydipsia. Does not bruise/bleed easily.   Psychiatric/Behavioral: Negative for depression and memory loss. The patient has insomnia. The patient is not nervous/anxious.        Physical Exam :   present in the room  Physical Exam   Constitutional: She is oriented to person, place, and time and well-developed, well-nourished, and in no distress. No distress.   HENT:   Head: Normocephalic and atraumatic.   Right Ear: External ear normal.   Left Ear: External ear normal.   Nose: Nose normal.   Mouth/Throat: Oropharynx is clear and moist.   Eyes: Pupils are equal, round, and reactive to light. Conjunctivae and EOM are normal. Right eye exhibits no discharge. Left eye exhibits no discharge. No scleral icterus.   Neck: Normal range of motion. Neck supple. No JVD present. No tracheal deviation present. No thyromegaly present.       Cardiovascular: Normal rate, regular rhythm, normal heart sounds and intact distal pulses.   Pulmonary/Chest: Effort normal. No stridor. No respiratory distress. She has no wheezes. She has no rales. She exhibits no tenderness.       Abdominal: Soft. Bowel sounds are normal. She exhibits no distension and no mass. There is no tenderness. There is no rebound and no guarding.   Genitourinary:   Genitourinary Comments: Not Examined   Musculoskeletal: Normal range of motion.   Lymphadenopathy:        Head (right side): No submental, no submandibular, no tonsillar, no preauricular, no posterior auricular and no occipital adenopathy present.        Head (left side): No submental, no submandibular, no tonsillar, no preauricular, no posterior auricular and no occipital adenopathy present.     She has no axillary adenopathy.         Right: No inguinal, no supraclavicular and no epitrochlear adenopathy present.        Left: No inguinal, no supraclavicular and no epitrochlear adenopathy present.   Neurological: She is alert and oriented to person, place, and time. She has normal motor skills, normal sensation, normal strength, normal reflexes and intact cranial nerves. Gait normal. GCS score is 15.   Skin: Skin is warm, dry and intact. No rash noted. She is not diaphoretic. No cyanosis. No pallor. Nails show no clubbing.   Psychiatric: Mood, memory, affect and judgment normal.         Labs & Imaging :  02/27/2020 : NFBS 74; Cr.0.92; eGFR 69; ca 10.4; bili 0.4 liver enzymes normal. Hgb 14.2; Hct 43.0. MCV 93.9; Platelets 369,000 ANC 5,234. CEA and CA 27,29 antigen normal.         Dx :   Remote Hx of Carcinoma Right Breast. Completed five years of Harmonal Therapy. Hypothyroidism. Rheumatoid arthritis.      Assessment & Plan: reviewed with patient. DARIN. Does not want to go on Prolia. Continue Fosamax 35 Mg once a week. Oncology followup in one year, with CBC,CMP,CEA,CA27.29.      Distress Screening Results: Psychosocial Distress screening score of   noted and reviewed. No intervention indicated.

## 2020-03-04 ENCOUNTER — OFFICE VISIT (OUTPATIENT)
Dept: HEMATOLOGY/ONCOLOGY | Facility: CLINIC | Age: 60
End: 2020-03-04
Payer: MEDICARE

## 2020-03-04 VITALS
HEIGHT: 68 IN | DIASTOLIC BLOOD PRESSURE: 90 MMHG | OXYGEN SATURATION: 97 % | WEIGHT: 212.31 LBS | BODY MASS INDEX: 32.18 KG/M2 | TEMPERATURE: 99 F | SYSTOLIC BLOOD PRESSURE: 142 MMHG | HEART RATE: 84 BPM

## 2020-03-04 DIAGNOSIS — M05.79 RHEUMATOID ARTHRITIS INVOLVING MULTIPLE SITES WITH POSITIVE RHEUMATOID FACTOR: ICD-10-CM

## 2020-03-04 DIAGNOSIS — Z85.3 PERSONAL HISTORY OF MALIGNANT NEOPLASM OF BREAST: Primary | ICD-10-CM

## 2020-03-04 PROCEDURE — 99999 PR PBB SHADOW E&M-EST. PATIENT-LVL III: CPT | Mod: PBBFAC,,, | Performed by: INTERNAL MEDICINE

## 2020-03-04 PROCEDURE — 3080F DIAST BP >= 90 MM HG: CPT | Mod: CPTII,S$GLB,, | Performed by: INTERNAL MEDICINE

## 2020-03-04 PROCEDURE — 3077F SYST BP >= 140 MM HG: CPT | Mod: CPTII,S$GLB,, | Performed by: INTERNAL MEDICINE

## 2020-03-04 PROCEDURE — 99213 PR OFFICE/OUTPT VISIT, EST, LEVL III, 20-29 MIN: ICD-10-PCS | Mod: S$GLB,,, | Performed by: INTERNAL MEDICINE

## 2020-03-04 PROCEDURE — 99999 PR PBB SHADOW E&M-EST. PATIENT-LVL III: ICD-10-PCS | Mod: PBBFAC,,, | Performed by: INTERNAL MEDICINE

## 2020-03-04 PROCEDURE — 99213 OFFICE O/P EST LOW 20 MIN: CPT | Mod: S$GLB,,, | Performed by: INTERNAL MEDICINE

## 2020-03-04 PROCEDURE — 3080F PR MOST RECENT DIASTOLIC BLOOD PRESSURE >= 90 MM HG: ICD-10-PCS | Mod: CPTII,S$GLB,, | Performed by: INTERNAL MEDICINE

## 2020-03-04 PROCEDURE — 3008F PR BODY MASS INDEX (BMI) DOCUMENTED: ICD-10-PCS | Mod: CPTII,S$GLB,, | Performed by: INTERNAL MEDICINE

## 2020-03-04 PROCEDURE — 3077F PR MOST RECENT SYSTOLIC BLOOD PRESSURE >= 140 MM HG: ICD-10-PCS | Mod: CPTII,S$GLB,, | Performed by: INTERNAL MEDICINE

## 2020-03-04 PROCEDURE — 3008F BODY MASS INDEX DOCD: CPT | Mod: CPTII,S$GLB,, | Performed by: INTERNAL MEDICINE

## 2020-03-04 RX ORDER — NIFEDIPINE 30 MG/1
30 TABLET, EXTENDED RELEASE ORAL DAILY
COMMUNITY
Start: 2020-02-24 | End: 2021-03-17

## 2020-03-21 DIAGNOSIS — M05.79 RHEUMATOID ARTHRITIS INVOLVING MULTIPLE SITES WITH POSITIVE RHEUMATOID FACTOR: Chronic | ICD-10-CM

## 2020-03-23 RX ORDER — METHOTREXATE 2.5 MG/1
TABLET ORAL
Qty: 78 TABLET | Refills: 0 | Status: SHIPPED | OUTPATIENT
Start: 2020-03-23 | End: 2020-07-27

## 2020-03-23 RX ORDER — HYDROXYCHLOROQUINE SULFATE 200 MG/1
TABLET, FILM COATED ORAL
Qty: 180 TABLET | Refills: 1 | Status: SHIPPED | OUTPATIENT
Start: 2020-03-23 | End: 2020-08-28 | Stop reason: SDUPTHER

## 2020-04-28 DIAGNOSIS — B37.31 VAGINAL YEAST INFECTION: ICD-10-CM

## 2020-04-29 RX ORDER — FLUCONAZOLE 150 MG/1
150 TABLET ORAL
Qty: 6 TABLET | Refills: 0 | Status: SHIPPED | OUTPATIENT
Start: 2020-04-29 | End: 2020-08-07

## 2020-05-13 ENCOUNTER — OFFICE VISIT (OUTPATIENT)
Dept: RHEUMATOLOGY | Facility: CLINIC | Age: 60
End: 2020-05-13
Payer: MEDICARE

## 2020-05-13 VITALS
HEIGHT: 68 IN | HEART RATE: 66 BPM | WEIGHT: 222.25 LBS | BODY MASS INDEX: 33.68 KG/M2 | DIASTOLIC BLOOD PRESSURE: 77 MMHG | SYSTOLIC BLOOD PRESSURE: 125 MMHG

## 2020-05-13 DIAGNOSIS — M17.5 OTHER SECONDARY OSTEOARTHRITIS OF LEFT KNEE: ICD-10-CM

## 2020-05-13 DIAGNOSIS — M05.762 RHEUMATOID ARTHRITIS INVOLVING LEFT KNEE WITH POSITIVE RHEUMATOID FACTOR: ICD-10-CM

## 2020-05-13 DIAGNOSIS — D84.9 IMMUNOSUPPRESSION: ICD-10-CM

## 2020-05-13 DIAGNOSIS — R76.8 ANA POSITIVE: ICD-10-CM

## 2020-05-13 DIAGNOSIS — M05.79 RHEUMATOID ARTHRITIS INVOLVING MULTIPLE SITES WITH POSITIVE RHEUMATOID FACTOR: Primary | Chronic | ICD-10-CM

## 2020-05-13 DIAGNOSIS — Z79.899 HIGH RISK MEDICATION USE: Chronic | ICD-10-CM

## 2020-05-13 PROBLEM — Z85.3 PERSONAL HISTORY OF MALIGNANT NEOPLASM OF BREAST: Status: RESOLVED | Noted: 2018-02-22 | Resolved: 2020-05-13

## 2020-05-13 PROCEDURE — 99214 OFFICE O/P EST MOD 30 MIN: CPT | Mod: 25,S$GLB,, | Performed by: INTERNAL MEDICINE

## 2020-05-13 PROCEDURE — 3078F DIAST BP <80 MM HG: CPT | Mod: CPTII,S$GLB,, | Performed by: INTERNAL MEDICINE

## 2020-05-13 PROCEDURE — 3074F SYST BP LT 130 MM HG: CPT | Mod: CPTII,S$GLB,, | Performed by: INTERNAL MEDICINE

## 2020-05-13 PROCEDURE — 3008F PR BODY MASS INDEX (BMI) DOCUMENTED: ICD-10-PCS | Mod: CPTII,S$GLB,, | Performed by: INTERNAL MEDICINE

## 2020-05-13 PROCEDURE — 99999 PR PBB SHADOW E&M-EST. PATIENT-LVL III: CPT | Mod: PBBFAC,,, | Performed by: INTERNAL MEDICINE

## 2020-05-13 PROCEDURE — 99214 PR OFFICE/OUTPT VISIT, EST, LEVL IV, 30-39 MIN: ICD-10-PCS | Mod: 25,S$GLB,, | Performed by: INTERNAL MEDICINE

## 2020-05-13 PROCEDURE — 99999 PR PBB SHADOW E&M-EST. PATIENT-LVL III: ICD-10-PCS | Mod: PBBFAC,,, | Performed by: INTERNAL MEDICINE

## 2020-05-13 PROCEDURE — 3078F PR MOST RECENT DIASTOLIC BLOOD PRESSURE < 80 MM HG: ICD-10-PCS | Mod: CPTII,S$GLB,, | Performed by: INTERNAL MEDICINE

## 2020-05-13 PROCEDURE — 96372 PR INJECTION,THERAP/PROPH/DIAG2ST, IM OR SUBCUT: ICD-10-PCS | Mod: S$GLB,,, | Performed by: INTERNAL MEDICINE

## 2020-05-13 PROCEDURE — 3074F PR MOST RECENT SYSTOLIC BLOOD PRESSURE < 130 MM HG: ICD-10-PCS | Mod: CPTII,S$GLB,, | Performed by: INTERNAL MEDICINE

## 2020-05-13 PROCEDURE — 96372 THER/PROPH/DIAG INJ SC/IM: CPT | Mod: S$GLB,,, | Performed by: INTERNAL MEDICINE

## 2020-05-13 PROCEDURE — 3008F BODY MASS INDEX DOCD: CPT | Mod: CPTII,S$GLB,, | Performed by: INTERNAL MEDICINE

## 2020-05-13 RX ORDER — TRIAMCINOLONE ACETONIDE 40 MG/ML
40 INJECTION, SUSPENSION INTRA-ARTICULAR; INTRAMUSCULAR
Status: DISCONTINUED | OUTPATIENT
Start: 2020-05-13 | End: 2020-05-13 | Stop reason: HOSPADM

## 2020-05-13 RX ORDER — DULOXETIN HYDROCHLORIDE 60 MG/1
CAPSULE, DELAYED RELEASE ORAL
COMMUNITY
Start: 2020-04-20

## 2020-05-13 RX ORDER — LEVOTHYROXINE SODIUM 125 UG/1
TABLET ORAL
COMMUNITY
Start: 2020-03-23

## 2020-05-13 RX ORDER — ALENDRONATE SODIUM 35 MG/1
TABLET ORAL
COMMUNITY
Start: 2020-03-23 | End: 2021-03-17 | Stop reason: SDUPTHER

## 2020-05-13 RX ADMIN — TRIAMCINOLONE ACETONIDE 40 MG: 40 INJECTION, SUSPENSION INTRA-ARTICULAR; INTRAMUSCULAR at 11:05

## 2020-05-13 ASSESSMENT — ROUTINE ASSESSMENT OF PATIENT INDEX DATA (RAPID3)
AM STIFFNESS SCORE: 1, YES
MDHAQ FUNCTION SCORE: .7
PAIN SCORE: 10
TOTAL RAPID3 SCORE: 6.11
PATIENT GLOBAL ASSESSMENT SCORE: 6
PSYCHOLOGICAL DISTRESS SCORE: 6.6
WHEN YOU AWAKENED IN THE MORNING OVER THE LAST WEEK, PLEASE INDICATE THE AMOUNT OF TIME IT TAKES UNTIL YOU ARE AS LIMBER AS YOU WILL BE FOR THE DAY: 24HRS
FATIGUE SCORE: 5.5

## 2020-05-13 NOTE — ASSESSMENT & PLAN NOTE
RA overall appears stable but increased knee pain is disabling her; other joints and inflammatory markers look good on HCQ + MTX    I am not sure if knee pain is mechanical/orthopedic or inflammatory or both; I will inject with triamcinolone today but if sx persist would consider MRI and/or arthroscopy    Otherwise cont mtx and hcq

## 2020-05-13 NOTE — PROCEDURES
Large Joint Aspiration/Injection  Date/Time: 5/13/2020 11:00 AM  Performed by: Kian Buchanan MD  Authorized by: Kian Buchanan MD     Consent Done?:  Yes (Verbal)  Indications:  Pain  Site marked: the procedure site was marked    Prep: patient was prepped and draped in usual sterile fashion      Local anesthesia used?: Yes    Anesthesia:  Local infiltration  Local anesthetic:  Lidocaine 1% without epinephrine  Anesthetic total (ml):  1      Details:  Needle Size:  22 G  Ultrasonic Guidance for needle placement?: No    Approach:  Anteromedial  Medications:  40 mg triamcinolone acetonide 40 mg/mL  Aspirate amount (mL):  0  Patient tolerance:  Patient tolerated the procedure well with no immediate complications

## 2020-05-13 NOTE — PROGRESS NOTES
"Subjective:       Patient ID: Sherrell Yip is a 60 y.o. female.    Chief Complaint: Disease Management    Former [employer] with [employer]   Just got approved for SSI      CCP+ RA with positive ANTONIA    MTX since Oct 2015     mg/d since April 2016 -Nov 2016 when switched to LFA  LFA added Nov 2016 then stopped 2017 due to elevated AST/ALT  Prednisone bridge 1 month, July 2015  NSAID: hx Celebrex; hx ibuprofen caused stomach ache  HCQ since  June 2017     Currently:   bid  MTX 15 mg / week and 1mg folic acid everyday  Meloxicam 15 mg when needed     Hx breast cancer 2010     L knee swelling and pain  Ortho injected ; only helped a few days  Wears a brace and LE swells distally   Standing it becomes painful  Cannot walk on treadmill due to   Hurts more during the night    R knee improved after Orthogel    Review of Systems   Constitutional: Negative for fatigue, fever and unexpected weight change.   HENT: Negative for mouth sores and trouble swallowing.         Dry mouth   Eyes: Negative for redness.        Dry eyes   Respiratory: Negative for cough and shortness of breath.    Cardiovascular: Negative for chest pain.   Gastrointestinal: Negative for abdominal pain, constipation and diarrhea.   Skin: Negative for rash.   Neurological: Negative for headaches.   Hematological: Negative for adenopathy. Does not bruise/bleed easily.   Psychiatric/Behavioral: Negative for sleep disturbance.         Objective:   /77   Pulse 66   Ht 5' 8" (1.727 m)   Wt 100.8 kg (222 lb 3.6 oz)   BMI 33.79 kg/m²      Physical Exam   Constitutional: She is well-developed, well-nourished, and in no distress.   Pulmonary/Chest: No respiratory distress.   No increased work of breathing   Neurological:   Alert, awake, with no abnormal movements   Skin:     No rash on face; skin clear   Psychiatric:   Normal mood and affect; clear, rational thinking; speech normal and not pressured   Musculoskeletal:   Knees tendr; L sl " swollen           2/7/2020 5/13/2020   Tender (VALDERRAMA-28) 2 / 28 2 / 28    Swollen (VALDERRAMA-28) 0 / 28  1 / 28    Provider Global Assessment 25 (mm) 20 (mm)   Patient Global Assessment 65 (mm) 60 (mm)   ESR 9 (mm/hr) 14 (mm/hr)   CRP Not documented Not documented   VALDERRAMA-28 (ESR) 3.24 (Moderate disease activity) 3.76 (Moderate disease activity)   VALDERRAMA-28 (CRP) Not documented Not documented     Lab Results   Component Value Date    SEDRATE 14 05/01/2020          Assessment:       1. Rheumatoid arthritis involving multiple sites with positive rheumatoid factor    2. High risk medication use    3. ANTONIA positive:     4. Immunosuppression    5. Rheumatoid arthritis involving left knee with positive rheumatoid factor    6. Other secondary osteoarthritis of left knee            Plan:       Problem List Items Addressed This Visit        Active Problems    Rheumatoid arthritis involving multiple sites with positive rheumatoid factor - Primary (Chronic)     RA overall appears stable but increased knee pain is disabling her; other joints and inflammatory markers look good on HCQ + MTX    I am not sure if knee pain is mechanical/orthopedic or inflammatory or both; I will inject with triamcinolone today but if sx persist would consider MRI and/or arthroscopy    Otherwise cont mtx and hcq          High risk medication use (Chronic)     No lab or clinical AE from current meds         ANTONIA positive:      No new sx          Immunosuppression     No interval infections           Other Visit Diagnoses     Rheumatoid arthritis involving left knee with positive rheumatoid factor        Other secondary osteoarthritis of left knee        Relevant Orders    Large Joint Aspiration/Injection

## 2020-05-22 ENCOUNTER — PATIENT MESSAGE (OUTPATIENT)
Dept: RHEUMATOLOGY | Facility: CLINIC | Age: 60
End: 2020-05-22

## 2020-06-19 DIAGNOSIS — Z12.31 SCREENING MAMMOGRAM FOR HIGH-RISK PATIENT: Primary | ICD-10-CM

## 2020-08-05 LAB — CRP SERPL-MCNC: 7.8 MG/L

## 2020-08-10 ENCOUNTER — HOSPITAL ENCOUNTER (OUTPATIENT)
Dept: RADIOLOGY | Facility: HOSPITAL | Age: 60
Discharge: HOME | End: 2020-08-10
Attending: SURGERY
Payer: MEDICARE

## 2020-08-10 DIAGNOSIS — Z12.31 SCREENING MAMMOGRAM FOR HIGH-RISK PATIENT: ICD-10-CM

## 2020-08-10 PROCEDURE — 77063 BREAST TOMOSYNTHESIS BI: CPT | Mod: 26,,, | Performed by: RADIOLOGY

## 2020-08-10 PROCEDURE — 77067 MAMMO DIGITAL SCREENING LEFT WITH TOMOSYNTHESIS_CAD: ICD-10-PCS | Mod: 26,,, | Performed by: RADIOLOGY

## 2020-08-10 PROCEDURE — 77067 SCR MAMMO BI INCL CAD: CPT | Mod: 26,,, | Performed by: RADIOLOGY

## 2020-08-10 PROCEDURE — 77063 MAMMO DIGITAL SCREENING LEFT WITH TOMOSYNTHESIS_CAD: ICD-10-PCS | Mod: 26,,, | Performed by: RADIOLOGY

## 2020-08-10 PROCEDURE — 77067 SCR MAMMO BI INCL CAD: CPT | Mod: TC

## 2020-08-19 ENCOUNTER — OFFICE VISIT (OUTPATIENT)
Dept: SURGERY | Facility: CLINIC | Age: 60
End: 2020-08-19
Payer: MEDICARE

## 2020-08-19 VITALS
DIASTOLIC BLOOD PRESSURE: 83 MMHG | SYSTOLIC BLOOD PRESSURE: 135 MMHG | BODY MASS INDEX: 32.4 KG/M2 | WEIGHT: 213.75 LBS | HEIGHT: 68 IN | HEART RATE: 70 BPM

## 2020-08-19 DIAGNOSIS — Z85.3 HISTORY OF BREAST CANCER: Primary | ICD-10-CM

## 2020-08-19 PROCEDURE — 99999 PR PBB SHADOW E&M-EST. PATIENT-LVL III: CPT | Mod: PBBFAC,,, | Performed by: SURGERY

## 2020-08-19 PROCEDURE — 99999 PR PBB SHADOW E&M-EST. PATIENT-LVL III: ICD-10-PCS | Mod: PBBFAC,,, | Performed by: SURGERY

## 2020-08-19 PROCEDURE — 99212 PR OFFICE/OUTPT VISIT, EST, LEVL II, 10-19 MIN: ICD-10-PCS | Mod: S$GLB,,, | Performed by: SURGERY

## 2020-08-19 PROCEDURE — 99212 OFFICE O/P EST SF 10 MIN: CPT | Mod: S$GLB,,, | Performed by: SURGERY

## 2020-08-19 RX ORDER — FLUTICASONE PROPIONATE 50 MCG
1 SPRAY, SUSPENSION (ML) NASAL DAILY
COMMUNITY
Start: 2020-05-27

## 2020-08-19 NOTE — PROGRESS NOTES
Subjective:       Patient ID: Sherrell Yip is a 60 y.o. female.    Chief Complaint: Follow-up    60-year-old woman with a history of a right-sided mastectomy for early stage breast cancer.  This was done in 2010 she is 10 years out.  She presents for surveillance and is without complaints.  She had a recent mammogram which was read as a BI-RADS 1      Objective:    Brest exam: Bilaterally without skin changes (other than healed surgical scars), adenopathy, or palpable lesions.   Physical Exam    Assessment:       1. History of breast cancer        Plan:       Doing well, continue annual surveillence,    annual mammograms, monthly SBE

## 2020-08-28 ENCOUNTER — OFFICE VISIT (OUTPATIENT)
Dept: RHEUMATOLOGY | Facility: CLINIC | Age: 60
End: 2020-08-28
Payer: MEDICARE

## 2020-08-28 ENCOUNTER — HOSPITAL ENCOUNTER (OUTPATIENT)
Dept: RADIOLOGY | Facility: HOSPITAL | Age: 60
Discharge: HOME | End: 2020-08-28
Attending: INTERNAL MEDICINE
Payer: MEDICARE

## 2020-08-28 VITALS
BODY MASS INDEX: 34.19 KG/M2 | TEMPERATURE: 98 F | DIASTOLIC BLOOD PRESSURE: 81 MMHG | HEIGHT: 67 IN | SYSTOLIC BLOOD PRESSURE: 133 MMHG | HEART RATE: 64 BPM | WEIGHT: 217.81 LBS

## 2020-08-28 DIAGNOSIS — R76.8 ANA POSITIVE: ICD-10-CM

## 2020-08-28 DIAGNOSIS — B37.31 VAGINAL YEAST INFECTION: ICD-10-CM

## 2020-08-28 DIAGNOSIS — M05.79 RHEUMATOID ARTHRITIS INVOLVING MULTIPLE SITES WITH POSITIVE RHEUMATOID FACTOR: Chronic | ICD-10-CM

## 2020-08-28 DIAGNOSIS — Z79.899 HIGH RISK MEDICATION USE: Chronic | ICD-10-CM

## 2020-08-28 DIAGNOSIS — M05.762 RHEUMATOID ARTHRITIS INVOLVING LEFT KNEE WITH POSITIVE RHEUMATOID FACTOR: ICD-10-CM

## 2020-08-28 DIAGNOSIS — M05.762 RHEUMATOID ARTHRITIS INVOLVING LEFT KNEE WITH POSITIVE RHEUMATOID FACTOR: Primary | ICD-10-CM

## 2020-08-28 DIAGNOSIS — M17.4 OTHER SECONDARY OSTEOARTHRITIS OF BOTH KNEES: ICD-10-CM

## 2020-08-28 PROBLEM — M17.0 OSTEOARTHRITIS OF BOTH KNEES: Status: ACTIVE | Noted: 2020-08-28

## 2020-08-28 PROCEDURE — 3008F PR BODY MASS INDEX (BMI) DOCUMENTED: ICD-10-PCS | Mod: CPTII,S$GLB,, | Performed by: INTERNAL MEDICINE

## 2020-08-28 PROCEDURE — 99214 OFFICE O/P EST MOD 30 MIN: CPT | Mod: S$GLB,,, | Performed by: INTERNAL MEDICINE

## 2020-08-28 PROCEDURE — 3075F PR MOST RECENT SYSTOLIC BLOOD PRESS GE 130-139MM HG: ICD-10-PCS | Mod: CPTII,S$GLB,, | Performed by: INTERNAL MEDICINE

## 2020-08-28 PROCEDURE — 99999 PR PBB SHADOW E&M-EST. PATIENT-LVL V: ICD-10-PCS | Mod: PBBFAC,,, | Performed by: INTERNAL MEDICINE

## 2020-08-28 PROCEDURE — 99214 PR OFFICE/OUTPT VISIT, EST, LEVL IV, 30-39 MIN: ICD-10-PCS | Mod: S$GLB,,, | Performed by: INTERNAL MEDICINE

## 2020-08-28 PROCEDURE — 73562 X-RAY EXAM OF KNEE 3: CPT | Mod: TC,50

## 2020-08-28 PROCEDURE — 3079F DIAST BP 80-89 MM HG: CPT | Mod: CPTII,S$GLB,, | Performed by: INTERNAL MEDICINE

## 2020-08-28 PROCEDURE — 99999 PR PBB SHADOW E&M-EST. PATIENT-LVL V: CPT | Mod: PBBFAC,,, | Performed by: INTERNAL MEDICINE

## 2020-08-28 PROCEDURE — 73562 X-RAY EXAM OF KNEE 3: CPT | Mod: 26,50,, | Performed by: RADIOLOGY

## 2020-08-28 PROCEDURE — 3079F PR MOST RECENT DIASTOLIC BLOOD PRESSURE 80-89 MM HG: ICD-10-PCS | Mod: CPTII,S$GLB,, | Performed by: INTERNAL MEDICINE

## 2020-08-28 PROCEDURE — 3075F SYST BP GE 130 - 139MM HG: CPT | Mod: CPTII,S$GLB,, | Performed by: INTERNAL MEDICINE

## 2020-08-28 PROCEDURE — 73562 XR KNEE ORTHO BILAT: ICD-10-PCS | Mod: 26,50,, | Performed by: RADIOLOGY

## 2020-08-28 PROCEDURE — 3008F BODY MASS INDEX DOCD: CPT | Mod: CPTII,S$GLB,, | Performed by: INTERNAL MEDICINE

## 2020-08-28 RX ORDER — FLUCONAZOLE 150 MG/1
150 TABLET ORAL
Qty: 6 TABLET | Refills: 0 | Status: SHIPPED | OUTPATIENT
Start: 2020-08-28 | End: 2020-10-19

## 2020-08-28 RX ORDER — PANTOPRAZOLE SODIUM 40 MG/1
TABLET, DELAYED RELEASE ORAL
COMMUNITY
Start: 2020-08-26

## 2020-08-28 RX ORDER — HYDROXYCHLOROQUINE SULFATE 200 MG/1
200 TABLET, FILM COATED ORAL 2 TIMES DAILY
Qty: 180 TABLET | Refills: 1 | Status: SHIPPED | OUTPATIENT
Start: 2020-08-28 | End: 2020-12-14 | Stop reason: SDUPTHER

## 2020-08-28 ASSESSMENT — ROUTINE ASSESSMENT OF PATIENT INDEX DATA (RAPID3)
TOTAL RAPID3 SCORE: 4.44
MDHAQ FUNCTION SCORE: 0.7
PAIN SCORE: 6
PSYCHOLOGICAL DISTRESS SCORE: 4.4
PATIENT GLOBAL ASSESSMENT SCORE: 5
FATIGUE SCORE: 5
AM STIFFNESS SCORE: 1, YES

## 2020-08-28 NOTE — ASSESSMENT & PLAN NOTE
RA overall controlled on current meds but increased L knee pain c/w secondary OA and XR shows medial joint space narrowing with osteophyte formation    Cont MTX and HCQ for RA  Cont lab q3m   F/u with ortho for knee OA; probably a good candidate for orthovisc injections (R knee responded well previously)  F/u with me in 3 mo with her sister

## 2020-08-28 NOTE — PROGRESS NOTES
"Subjective:       Patient ID: Sherrell Yip is a 60 y.o. female.    Chief Complaint: Disease Management    Former [employer] with [employer] system   Just got approved for SSI      CCP+ RA with positive ANTONIA    MTX since Oct 2015     mg/d since April 2016 -Nov 2016 when switched to LFA  LFA added Nov 2016 then stopped 2017 due to elevated AST/ALT  Prednisone bridge 1 month, July 2015  NSAID: hx Celebrex; hx ibuprofen caused stomach ache  HCQ since  June 2017     Hx breast cancer 2010     Currently:   bid  MTX 15 mg / week and 1mg folic acid everyday  Meloxicam 15 mg when needed    L knee was better after injection May 13, but now swelling again; elastic brace helps some; still swells and hurts a lot  R knee a little pain  No other joint pains or swelling  Some hand stiffness but it loosens up and does not ache    Went to [place] one day and knee was very bad with walking and stairs    Review of Systems   Constitutional: Positive for fatigue and unexpected weight change. Negative for fever.   HENT: Negative for mouth sores and trouble swallowing.         No Dry mouth   Eyes: Negative for redness.        No Dry eyes   Respiratory: Negative for cough and shortness of breath.    Cardiovascular: Negative for chest pain.   Gastrointestinal: Negative for abdominal pain, constipation and diarrhea.   Genitourinary: Negative for dysuria and genital sores.   Skin: Negative for rash.   Neurological: Negative for headaches.   Hematological: Negative for adenopathy. Bruises/bleeds easily.   Psychiatric/Behavioral: Positive for sleep disturbance.         Objective:   /81 (BP Location: Left arm, Patient Position: Sitting, BP Method: Large (Automatic))   Pulse 64   Temp 98.1 °F (36.7 °C) (Oral)   Ht 5' 7" (1.702 m)   Wt 98.8 kg (217 lb 13 oz)   BMI 34.11 kg/m²      Physical Exam   Constitutional: She is well-developed, well-nourished, and in no distress.   Eyes: Conjunctivae are normal.   Cardiovascular: Normal " rate and regular rhythm.    Pulmonary/Chest: She has no wheezes. She has no rales.   Lymphadenopathy:     She has no cervical adenopathy.   Skin: No rash noted.     Musculoskeletal:      Comments: L knee and ankle tender; ankle swollen           2/7/2020 5/13/2020 8/28/2020   VALDERRAMA-28 (ESR) 3.24 (Moderate disease activity) 3.76 (Moderate disease activity) 2.8 (Low disease activity)   VALDERRAMA-28 (CRP) -- -- 3 (Low disease activity)   Tender (VALDERRAMA-28) 2 / 28 2 / 28 1 / 28    Swollen (VALDERRAMA-28) 0 / 28 1 / 28 0 / 28    Provider Global 25 mm 20 mm 25 mm   Patient Global 65 mm 60 mm 50 mm   ESR 9 mm/hr 14 mm/hr 9 mm/hr   CRP -- -- 7.8 mg/L     Lab Results   Component Value Date    SEDRATE 9 08/04/2020          Assessment:       1. Rheumatoid arthritis involving left knee with positive rheumatoid factor    2. Rheumatoid arthritis involving multiple sites with positive rheumatoid factor    3. High risk medication use    4. ANTONIA positive:     5. Other secondary osteoarthritis of both knees    6. Vaginal yeast infection            Plan:       Problem List Items Addressed This Visit        Active Problems    Vaginal yeast infection    Relevant Medications    fluconazole (DIFLUCAN) 150 MG Tab    Rheumatoid arthritis involving multiple sites with positive rheumatoid factor (Chronic)     RA overall controlled on current meds but increased L knee pain c/w secondary OA and XR shows medial joint space narrowing with osteophyte formation    Cont MTX and HCQ for RA  Cont lab q3m   F/u with ortho for knee OA; probably a good candidate for orthovisc injections (R knee responded well previously)  F/u with me in 3 mo with her sister         Relevant Medications    hydrOXYchloroQUINE (PLAQUENIL) 200 mg tablet    High risk medication use (Chronic)     Lab ok for mtx  Has eye exam for hcq in October         Osteoarthritis of both knees     XR shows bilateral medial OA  She will follow up with Dr Jordan for viscosupplement injection         ANTONIA  positive:      No non-RA sx           Other Visit Diagnoses     Rheumatoid arthritis involving left knee with positive rheumatoid factor    -  Primary    Relevant Orders    X-ray Knee Ortho Bilateral (Completed)      Later:  Knee shows OA medial joint both knees  If viscosupplement helped on R, then potentially may help on L; she will f/u with Dr Jordan

## 2020-12-14 ENCOUNTER — OFFICE VISIT (OUTPATIENT)
Dept: RHEUMATOLOGY | Facility: CLINIC | Age: 60
End: 2020-12-14
Payer: MEDICARE

## 2020-12-14 VITALS
HEART RATE: 63 BPM | HEIGHT: 67 IN | SYSTOLIC BLOOD PRESSURE: 124 MMHG | DIASTOLIC BLOOD PRESSURE: 85 MMHG | WEIGHT: 216.06 LBS | BODY MASS INDEX: 33.91 KG/M2

## 2020-12-14 DIAGNOSIS — M05.79 RHEUMATOID ARTHRITIS INVOLVING MULTIPLE SITES WITH POSITIVE RHEUMATOID FACTOR: Primary | Chronic | ICD-10-CM

## 2020-12-14 DIAGNOSIS — Z79.899 HIGH RISK MEDICATION USE: Chronic | ICD-10-CM

## 2020-12-14 DIAGNOSIS — B37.31 VAGINAL YEAST INFECTION: ICD-10-CM

## 2020-12-14 PROCEDURE — 3079F PR MOST RECENT DIASTOLIC BLOOD PRESSURE 80-89 MM HG: ICD-10-PCS | Mod: CPTII,S$GLB,, | Performed by: INTERNAL MEDICINE

## 2020-12-14 PROCEDURE — 99999 PR PBB SHADOW E&M-EST. PATIENT-LVL IV: ICD-10-PCS | Mod: PBBFAC,,, | Performed by: INTERNAL MEDICINE

## 2020-12-14 PROCEDURE — 99213 OFFICE O/P EST LOW 20 MIN: CPT | Mod: S$GLB,,, | Performed by: INTERNAL MEDICINE

## 2020-12-14 PROCEDURE — 3079F DIAST BP 80-89 MM HG: CPT | Mod: CPTII,S$GLB,, | Performed by: INTERNAL MEDICINE

## 2020-12-14 PROCEDURE — 3074F SYST BP LT 130 MM HG: CPT | Mod: CPTII,S$GLB,, | Performed by: INTERNAL MEDICINE

## 2020-12-14 PROCEDURE — 99213 PR OFFICE/OUTPT VISIT, EST, LEVL III, 20-29 MIN: ICD-10-PCS | Mod: S$GLB,,, | Performed by: INTERNAL MEDICINE

## 2020-12-14 PROCEDURE — 3074F PR MOST RECENT SYSTOLIC BLOOD PRESSURE < 130 MM HG: ICD-10-PCS | Mod: CPTII,S$GLB,, | Performed by: INTERNAL MEDICINE

## 2020-12-14 PROCEDURE — 99999 PR PBB SHADOW E&M-EST. PATIENT-LVL IV: CPT | Mod: PBBFAC,,, | Performed by: INTERNAL MEDICINE

## 2020-12-14 RX ORDER — FOLIC ACID 1 MG/1
1000 TABLET ORAL DAILY
Qty: 90 TABLET | Refills: 3 | Status: SHIPPED | OUTPATIENT
Start: 2020-12-14 | End: 2021-03-17 | Stop reason: SDUPTHER

## 2020-12-14 RX ORDER — FLUCONAZOLE 150 MG/1
150 TABLET ORAL
Qty: 6 TABLET | Refills: 0 | Status: SHIPPED | OUTPATIENT
Start: 2020-12-14 | End: 2021-02-08

## 2020-12-14 RX ORDER — FOLIC ACID 1 MG/1
1 TABLET ORAL DAILY
Qty: 90 TABLET | Refills: 0 | Status: SHIPPED | OUTPATIENT
Start: 2020-12-14 | End: 2021-03-11

## 2020-12-14 RX ORDER — HYDROXYCHLOROQUINE SULFATE 200 MG/1
200 TABLET, FILM COATED ORAL 2 TIMES DAILY
Qty: 180 TABLET | Refills: 1 | Status: SHIPPED | OUTPATIENT
Start: 2020-12-14 | End: 2021-03-17 | Stop reason: SDUPTHER

## 2020-12-14 RX ORDER — TELMISARTAN 20 MG/1
20 TABLET ORAL DAILY
COMMUNITY

## 2020-12-14 NOTE — PROGRESS NOTES
"Subjective:       Patient ID: Sherrell Yip is a 60 y.o. female.    Chief Complaint: Disease Management    Former James with [employer] system   Just got approved for SSI      CCP+ RA with positive ANTONIA    MTX since Oct 2015     mg/d since April 2016 -Nov 2016 when switched to LFA  LFA added Nov 2016 then stopped 2017 due to elevated AST/ALT  Prednisone bridge 1 month, July 2015  NSAID: hx Celebrex; hx ibuprofen caused stomach ache  HCQ since  June 2017     Hx breast cancer 2010      Currently:   bid  MTX 15 mg / week and 1mg folic acid everyday (states she was having difficulty getting the folic acid)  Meloxicam 15 mg when needed      Knees are feeling better since her blood pressure medications were changed and her lower limb edema improved  She is getting orthovisc injections next week, they have been working well for her   No other joint pains or swelling  Some hand stiffness but it loosens up and does not ache       Review of Systems   Constitutional: Negative for fatigue, fever and unexpected weight change.   HENT: Negative for mouth sores and trouble swallowing.         No Dry mouth   Eyes: Negative for redness.        No Dry eyes   Respiratory: Negative for cough and shortness of breath.    Cardiovascular: Negative for chest pain.   Gastrointestinal: Negative for abdominal pain, constipation and diarrhea.   Genitourinary: Negative for dysuria and genital sores.   Musculoskeletal: Positive for arthralgias (Both knees).   Skin: Negative for rash.   Neurological: Negative for headaches.   Hematological: Negative for adenopathy. Does not bruise/bleed easily.   Psychiatric/Behavioral: Negative for sleep disturbance.         Objective:   /85   Pulse 63   Ht 5' 7" (1.702 m)   Wt 98 kg (216 lb 0.8 oz)   BMI 33.84 kg/m²      Physical Exam   Constitutional: She is well-developed, well-nourished, and in no distress.   Eyes: Conjunctivae are normal.   Cardiovascular: Normal rate and regular rhythm.  "   Pulmonary/Chest: She has no wheezes. She has no rales.   Lymphadenopathy:     She has no cervical adenopathy.   Skin: No rash noted.     Musculoskeletal: Normal range of motion.      Comments: Knees minimally tender            Assessment:       1. Rheumatoid arthritis involving multiple sites with positive rheumatoid factor    2. High risk medication use            Plan:       Problem List Items Addressed This Visit        Orthopedic    Rheumatoid arthritis involving multiple sites with positive rheumatoid factor - Primary (Chronic)    RA currently stable. Patient stated that ortho told her she will likely need a total knee replacement on her left knee but she wants to hold off on that for as long as she can.     Cont MTX and HCQ for RA  Cont lab q3m   Orthovisc injections planned for next week   F/u in 3 mo with her sister    Relevant Medications    folic acid (FOLVITE) 1 MG tablet    hydrOXYchloroQUINE (PLAQUENIL) 200 mg tablet    Other Relevant Orders    Sedimentation rate    Comprehensive Metabolic Panel    CBC Auto Differential       Other    High risk medication use (Chronic)    Relevant Orders    Sedimentation rate    Comprehensive Metabolic Panel    CBC Auto Differential

## 2020-12-15 NOTE — PROGRESS NOTES
I have personally taken the history and examined this patient and agree with the resident's note as stated.

## 2021-02-23 ENCOUNTER — PATIENT MESSAGE (OUTPATIENT)
Dept: RHEUMATOLOGY | Facility: CLINIC | Age: 61
End: 2021-02-23

## 2021-03-10 ENCOUNTER — OFFICE VISIT (OUTPATIENT)
Dept: HEMATOLOGY/ONCOLOGY | Facility: CLINIC | Age: 61
End: 2021-03-10
Payer: MEDICARE

## 2021-03-10 VITALS
SYSTOLIC BLOOD PRESSURE: 133 MMHG | OXYGEN SATURATION: 97 % | HEART RATE: 66 BPM | DIASTOLIC BLOOD PRESSURE: 78 MMHG | HEIGHT: 67 IN | BODY MASS INDEX: 33.6 KG/M2 | TEMPERATURE: 98 F | WEIGHT: 214.06 LBS

## 2021-03-10 DIAGNOSIS — M05.79 RHEUMATOID ARTHRITIS INVOLVING MULTIPLE SITES WITH POSITIVE RHEUMATOID FACTOR: ICD-10-CM

## 2021-03-10 DIAGNOSIS — Z85.3 PERSONAL HISTORY OF MALIGNANT NEOPLASM OF BREAST: Primary | ICD-10-CM

## 2021-03-10 PROCEDURE — 3075F SYST BP GE 130 - 139MM HG: CPT | Mod: CPTII,S$GLB,, | Performed by: INTERNAL MEDICINE

## 2021-03-10 PROCEDURE — 99999 PR PBB SHADOW E&M-EST. PATIENT-LVL IV: ICD-10-PCS | Mod: PBBFAC,,, | Performed by: INTERNAL MEDICINE

## 2021-03-10 PROCEDURE — 1126F AMNT PAIN NOTED NONE PRSNT: CPT | Mod: S$GLB,,, | Performed by: INTERNAL MEDICINE

## 2021-03-10 PROCEDURE — 3008F BODY MASS INDEX DOCD: CPT | Mod: CPTII,S$GLB,, | Performed by: INTERNAL MEDICINE

## 2021-03-10 PROCEDURE — 99213 OFFICE O/P EST LOW 20 MIN: CPT | Mod: S$GLB,,, | Performed by: INTERNAL MEDICINE

## 2021-03-10 PROCEDURE — 99999 PR PBB SHADOW E&M-EST. PATIENT-LVL IV: CPT | Mod: PBBFAC,,, | Performed by: INTERNAL MEDICINE

## 2021-03-10 PROCEDURE — 3078F DIAST BP <80 MM HG: CPT | Mod: CPTII,S$GLB,, | Performed by: INTERNAL MEDICINE

## 2021-03-10 PROCEDURE — 1126F PR PAIN SEVERITY QUANTIFIED, NO PAIN PRESENT: ICD-10-PCS | Mod: S$GLB,,, | Performed by: INTERNAL MEDICINE

## 2021-03-10 PROCEDURE — 99213 PR OFFICE/OUTPT VISIT, EST, LEVL III, 20-29 MIN: ICD-10-PCS | Mod: S$GLB,,, | Performed by: INTERNAL MEDICINE

## 2021-03-10 PROCEDURE — 3075F PR MOST RECENT SYSTOLIC BLOOD PRESS GE 130-139MM HG: ICD-10-PCS | Mod: CPTII,S$GLB,, | Performed by: INTERNAL MEDICINE

## 2021-03-10 PROCEDURE — 3078F PR MOST RECENT DIASTOLIC BLOOD PRESSURE < 80 MM HG: ICD-10-PCS | Mod: CPTII,S$GLB,, | Performed by: INTERNAL MEDICINE

## 2021-03-10 PROCEDURE — 3008F PR BODY MASS INDEX (BMI) DOCUMENTED: ICD-10-PCS | Mod: CPTII,S$GLB,, | Performed by: INTERNAL MEDICINE

## 2021-03-17 ENCOUNTER — OFFICE VISIT (OUTPATIENT)
Dept: RHEUMATOLOGY | Facility: CLINIC | Age: 61
End: 2021-03-17
Payer: MEDICARE

## 2021-03-17 VITALS
DIASTOLIC BLOOD PRESSURE: 87 MMHG | WEIGHT: 221.56 LBS | SYSTOLIC BLOOD PRESSURE: 138 MMHG | HEIGHT: 66 IN | BODY MASS INDEX: 35.61 KG/M2 | HEART RATE: 63 BPM

## 2021-03-17 DIAGNOSIS — B37.31 VAGINAL YEAST INFECTION: ICD-10-CM

## 2021-03-17 DIAGNOSIS — M17.4 OTHER SECONDARY OSTEOARTHRITIS OF BOTH KNEES: ICD-10-CM

## 2021-03-17 DIAGNOSIS — M05.79 RHEUMATOID ARTHRITIS INVOLVING MULTIPLE SITES WITH POSITIVE RHEUMATOID FACTOR: Primary | Chronic | ICD-10-CM

## 2021-03-17 DIAGNOSIS — Z79.899 HIGH RISK MEDICATION USE: Chronic | ICD-10-CM

## 2021-03-17 PROCEDURE — 3075F SYST BP GE 130 - 139MM HG: CPT | Mod: CPTII,S$GLB,, | Performed by: INTERNAL MEDICINE

## 2021-03-17 PROCEDURE — 99999 PR PBB SHADOW E&M-EST. PATIENT-LVL IV: CPT | Mod: PBBFAC,,, | Performed by: INTERNAL MEDICINE

## 2021-03-17 PROCEDURE — 99999 PR PBB SHADOW E&M-EST. PATIENT-LVL IV: ICD-10-PCS | Mod: PBBFAC,,, | Performed by: INTERNAL MEDICINE

## 2021-03-17 PROCEDURE — 3008F BODY MASS INDEX DOCD: CPT | Mod: CPTII,S$GLB,, | Performed by: INTERNAL MEDICINE

## 2021-03-17 PROCEDURE — 3079F DIAST BP 80-89 MM HG: CPT | Mod: CPTII,S$GLB,, | Performed by: INTERNAL MEDICINE

## 2021-03-17 PROCEDURE — 99214 PR OFFICE/OUTPT VISIT, EST, LEVL IV, 30-39 MIN: ICD-10-PCS | Mod: S$GLB,,, | Performed by: INTERNAL MEDICINE

## 2021-03-17 PROCEDURE — 3075F PR MOST RECENT SYSTOLIC BLOOD PRESS GE 130-139MM HG: ICD-10-PCS | Mod: CPTII,S$GLB,, | Performed by: INTERNAL MEDICINE

## 2021-03-17 PROCEDURE — 3079F PR MOST RECENT DIASTOLIC BLOOD PRESSURE 80-89 MM HG: ICD-10-PCS | Mod: CPTII,S$GLB,, | Performed by: INTERNAL MEDICINE

## 2021-03-17 PROCEDURE — 1125F AMNT PAIN NOTED PAIN PRSNT: CPT | Mod: S$GLB,,, | Performed by: INTERNAL MEDICINE

## 2021-03-17 PROCEDURE — 1125F PR PAIN SEVERITY QUANTIFIED, PAIN PRESENT: ICD-10-PCS | Mod: S$GLB,,, | Performed by: INTERNAL MEDICINE

## 2021-03-17 PROCEDURE — 3008F PR BODY MASS INDEX (BMI) DOCUMENTED: ICD-10-PCS | Mod: CPTII,S$GLB,, | Performed by: INTERNAL MEDICINE

## 2021-03-17 PROCEDURE — 99214 OFFICE O/P EST MOD 30 MIN: CPT | Mod: S$GLB,,, | Performed by: INTERNAL MEDICINE

## 2021-03-17 RX ORDER — FLUCONAZOLE 150 MG/1
150 TABLET ORAL
Qty: 6 TABLET | Refills: 0 | Status: SHIPPED | OUTPATIENT
Start: 2021-03-17 | End: 2021-04-22

## 2021-03-17 RX ORDER — FOLIC ACID 1 MG/1
1000 TABLET ORAL DAILY
Qty: 90 TABLET | Refills: 3 | Status: SHIPPED | OUTPATIENT
Start: 2021-03-17 | End: 2021-12-01 | Stop reason: SDUPTHER

## 2021-03-17 RX ORDER — HYDROXYCHLOROQUINE SULFATE 200 MG/1
200 TABLET, FILM COATED ORAL 2 TIMES DAILY
Qty: 180 TABLET | Refills: 1 | Status: SHIPPED | OUTPATIENT
Start: 2021-03-17 | End: 2021-08-31

## 2021-03-17 RX ORDER — METHOTREXATE 2.5 MG/1
TABLET ORAL
Qty: 78 TABLET | Refills: 0 | Status: SHIPPED | OUTPATIENT
Start: 2021-03-17 | End: 2021-07-16 | Stop reason: SDUPTHER

## 2021-03-17 ASSESSMENT — ROUTINE ASSESSMENT OF PATIENT INDEX DATA (RAPID3)
AM STIFFNESS SCORE: 1, YES
PSYCHOLOGICAL DISTRESS SCORE: 4.4
PAIN SCORE: 6
TOTAL RAPID3 SCORE: 5.11
FATIGUE SCORE: 8
PATIENT GLOBAL ASSESSMENT SCORE: 6
MDHAQ FUNCTION SCORE: 1

## 2021-04-13 ENCOUNTER — TELEPHONE (OUTPATIENT)
Dept: RHEUMATOLOGY | Facility: CLINIC | Age: 61
End: 2021-04-13

## 2021-04-15 ENCOUNTER — OFFICE VISIT (OUTPATIENT)
Dept: RHEUMATOLOGY | Facility: CLINIC | Age: 61
End: 2021-04-15
Payer: MEDICARE

## 2021-04-15 VITALS
WEIGHT: 222.69 LBS | SYSTOLIC BLOOD PRESSURE: 126 MMHG | DIASTOLIC BLOOD PRESSURE: 72 MMHG | BODY MASS INDEX: 35.94 KG/M2 | HEART RATE: 69 BPM

## 2021-04-15 DIAGNOSIS — M05.79 RHEUMATOID ARTHRITIS INVOLVING MULTIPLE SITES WITH POSITIVE RHEUMATOID FACTOR: Chronic | ICD-10-CM

## 2021-04-15 DIAGNOSIS — M17.4 OTHER SECONDARY OSTEOARTHRITIS OF BOTH KNEES: Primary | ICD-10-CM

## 2021-04-15 DIAGNOSIS — I10 ESSENTIAL HYPERTENSION: ICD-10-CM

## 2021-04-15 PROCEDURE — 99499 RISK ADDL DX/OHS AUDIT: ICD-10-PCS | Mod: S$GLB,,, | Performed by: INTERNAL MEDICINE

## 2021-04-15 PROCEDURE — 99213 PR OFFICE/OUTPT VISIT, EST, LEVL III, 20-29 MIN: ICD-10-PCS | Mod: S$GLB,,, | Performed by: INTERNAL MEDICINE

## 2021-04-15 PROCEDURE — 99999 PR PBB SHADOW E&M-EST. PATIENT-LVL III: CPT | Mod: PBBFAC,,, | Performed by: INTERNAL MEDICINE

## 2021-04-15 PROCEDURE — 3008F BODY MASS INDEX DOCD: CPT | Mod: CPTII,S$GLB,, | Performed by: INTERNAL MEDICINE

## 2021-04-15 PROCEDURE — 3008F PR BODY MASS INDEX (BMI) DOCUMENTED: ICD-10-PCS | Mod: CPTII,S$GLB,, | Performed by: INTERNAL MEDICINE

## 2021-04-15 PROCEDURE — 99499 UNLISTED E&M SERVICE: CPT | Mod: S$GLB,,, | Performed by: INTERNAL MEDICINE

## 2021-04-15 PROCEDURE — 99213 OFFICE O/P EST LOW 20 MIN: CPT | Mod: S$GLB,,, | Performed by: INTERNAL MEDICINE

## 2021-04-15 PROCEDURE — 1125F AMNT PAIN NOTED PAIN PRSNT: CPT | Mod: S$GLB,,, | Performed by: INTERNAL MEDICINE

## 2021-04-15 PROCEDURE — 1125F PR PAIN SEVERITY QUANTIFIED, PAIN PRESENT: ICD-10-PCS | Mod: S$GLB,,, | Performed by: INTERNAL MEDICINE

## 2021-04-15 PROCEDURE — 99999 PR PBB SHADOW E&M-EST. PATIENT-LVL III: ICD-10-PCS | Mod: PBBFAC,,, | Performed by: INTERNAL MEDICINE

## 2021-04-15 RX ORDER — NITROFURANTOIN 25; 75 MG/1; MG/1
100 CAPSULE ORAL 2 TIMES DAILY
COMMUNITY
Start: 2021-04-14 | End: 2022-04-05

## 2021-04-15 RX ORDER — CELECOXIB 200 MG/1
200-400 CAPSULE ORAL DAILY PRN
Qty: 60 CAPSULE | Refills: 2 | Status: SHIPPED | OUTPATIENT
Start: 2021-04-15 | End: 2021-08-10

## 2021-04-15 ASSESSMENT — ROUTINE ASSESSMENT OF PATIENT INDEX DATA (RAPID3)
AM STIFFNESS SCORE: 1, YES
TOTAL RAPID3 SCORE: 4
PAIN SCORE: 5
PATIENT GLOBAL ASSESSMENT SCORE: 5
FATIGUE SCORE: 5.5
PSYCHOLOGICAL DISTRESS SCORE: 4.4
MDHAQ FUNCTION SCORE: 0.6

## 2021-05-21 ENCOUNTER — TELEPHONE (OUTPATIENT)
Dept: SURGERY | Facility: CLINIC | Age: 61
End: 2021-05-21

## 2021-07-01 ENCOUNTER — PATIENT MESSAGE (OUTPATIENT)
Dept: ADMINISTRATIVE | Facility: OTHER | Age: 61
End: 2021-07-01

## 2021-07-16 ENCOUNTER — OFFICE VISIT (OUTPATIENT)
Dept: RHEUMATOLOGY | Facility: CLINIC | Age: 61
End: 2021-07-16
Payer: MEDICARE

## 2021-07-16 VITALS
HEART RATE: 61 BPM | SYSTOLIC BLOOD PRESSURE: 131 MMHG | BODY MASS INDEX: 35.57 KG/M2 | HEIGHT: 66 IN | WEIGHT: 221.31 LBS | DIASTOLIC BLOOD PRESSURE: 85 MMHG

## 2021-07-16 DIAGNOSIS — M17.4 OTHER SECONDARY OSTEOARTHRITIS OF BOTH KNEES: ICD-10-CM

## 2021-07-16 DIAGNOSIS — Z79.899 HIGH RISK MEDICATION USE: Chronic | ICD-10-CM

## 2021-07-16 DIAGNOSIS — M05.79 RHEUMATOID ARTHRITIS INVOLVING MULTIPLE SITES WITH POSITIVE RHEUMATOID FACTOR: Primary | Chronic | ICD-10-CM

## 2021-07-16 PROCEDURE — 99999 PR PBB SHADOW E&M-EST. PATIENT-LVL IV: CPT | Mod: PBBFAC,,, | Performed by: INTERNAL MEDICINE

## 2021-07-16 PROCEDURE — 99999 PR PBB SHADOW E&M-EST. PATIENT-LVL IV: ICD-10-PCS | Mod: PBBFAC,,, | Performed by: INTERNAL MEDICINE

## 2021-07-16 PROCEDURE — 1125F PR PAIN SEVERITY QUANTIFIED, PAIN PRESENT: ICD-10-PCS | Mod: S$GLB,,, | Performed by: INTERNAL MEDICINE

## 2021-07-16 PROCEDURE — 99214 OFFICE O/P EST MOD 30 MIN: CPT | Mod: S$GLB,,, | Performed by: INTERNAL MEDICINE

## 2021-07-16 PROCEDURE — 1125F AMNT PAIN NOTED PAIN PRSNT: CPT | Mod: S$GLB,,, | Performed by: INTERNAL MEDICINE

## 2021-07-16 PROCEDURE — 99214 PR OFFICE/OUTPT VISIT, EST, LEVL IV, 30-39 MIN: ICD-10-PCS | Mod: S$GLB,,, | Performed by: INTERNAL MEDICINE

## 2021-07-16 PROCEDURE — 3008F PR BODY MASS INDEX (BMI) DOCUMENTED: ICD-10-PCS | Mod: CPTII,S$GLB,, | Performed by: INTERNAL MEDICINE

## 2021-07-16 PROCEDURE — 3008F BODY MASS INDEX DOCD: CPT | Mod: CPTII,S$GLB,, | Performed by: INTERNAL MEDICINE

## 2021-07-16 RX ORDER — METHOTREXATE 2.5 MG/1
TABLET ORAL
Qty: 78 TABLET | Refills: 0 | Status: SHIPPED | OUTPATIENT
Start: 2021-07-16 | End: 2021-10-27 | Stop reason: SDUPTHER

## 2021-07-16 ASSESSMENT — ROUTINE ASSESSMENT OF PATIENT INDEX DATA (RAPID3)
WHEN YOU AWAKENED IN THE MORNING OVER THE LAST WEEK, PLEASE INDICATE THE AMOUNT OF TIME IT TAKES UNTIL YOU ARE AS LIMBER AS YOU WILL BE FOR THE DAY: 1 MIN
TOTAL RAPID3 SCORE: 3.06
MDHAQ FUNCTION SCORE: 0.5
AM STIFFNESS SCORE: 1, YES
PAIN SCORE: 4.5
PSYCHOLOGICAL DISTRESS SCORE: 4.4
PATIENT GLOBAL ASSESSMENT SCORE: 3
FATIGUE SCORE: 4

## 2021-07-21 ENCOUNTER — TELEPHONE (OUTPATIENT)
Dept: SURGERY | Facility: CLINIC | Age: 61
End: 2021-07-21

## 2021-08-03 ENCOUNTER — TELEPHONE (OUTPATIENT)
Dept: SURGERY | Facility: CLINIC | Age: 61
End: 2021-08-03

## 2021-08-04 DIAGNOSIS — Z85.3 HISTORY OF BREAST CANCER: Primary | ICD-10-CM

## 2021-08-16 ENCOUNTER — HOSPITAL ENCOUNTER (OUTPATIENT)
Dept: RADIOLOGY | Facility: HOSPITAL | Age: 61
Discharge: HOME | End: 2021-08-16
Attending: SURGERY
Payer: MEDICARE

## 2021-08-16 DIAGNOSIS — Z85.3 HISTORY OF BREAST CANCER: ICD-10-CM

## 2021-08-16 PROCEDURE — 77065 DX MAMMO INCL CAD UNI: CPT | Mod: 26,LT,, | Performed by: RADIOLOGY

## 2021-08-16 PROCEDURE — 77061 MAMMO DIGITAL DIAGNOSTIC LEFT WITH TOMO: ICD-10-PCS | Mod: 26,LT,, | Performed by: RADIOLOGY

## 2021-08-16 PROCEDURE — 77061 BREAST TOMOSYNTHESIS UNI: CPT | Mod: TC,LT

## 2021-08-16 PROCEDURE — 77061 BREAST TOMOSYNTHESIS UNI: CPT | Mod: 26,LT,, | Performed by: RADIOLOGY

## 2021-08-16 PROCEDURE — 77065 MAMMO DIGITAL DIAGNOSTIC LEFT WITH TOMO: ICD-10-PCS | Mod: 26,LT,, | Performed by: RADIOLOGY

## 2021-10-20 DIAGNOSIS — M05.79 RHEUMATOID ARTHRITIS INVOLVING MULTIPLE SITES WITH POSITIVE RHEUMATOID FACTOR: Chronic | ICD-10-CM

## 2021-10-20 RX ORDER — METHOTREXATE 2.5 MG/1
TABLET ORAL
Qty: 78 TABLET | Refills: 0 | OUTPATIENT
Start: 2021-10-20

## 2021-10-27 ENCOUNTER — PATIENT MESSAGE (OUTPATIENT)
Dept: RHEUMATOLOGY | Facility: CLINIC | Age: 61
End: 2021-10-27
Payer: MEDICARE

## 2021-10-27 DIAGNOSIS — M05.79 RHEUMATOID ARTHRITIS INVOLVING MULTIPLE SITES WITH POSITIVE RHEUMATOID FACTOR: Chronic | ICD-10-CM

## 2021-10-27 RX ORDER — METHOTREXATE 2.5 MG/1
TABLET ORAL
Qty: 78 TABLET | Refills: 0 | Status: SHIPPED | OUTPATIENT
Start: 2021-10-27 | End: 2021-12-01 | Stop reason: SDUPTHER

## 2021-10-28 ENCOUNTER — TELEPHONE (OUTPATIENT)
Dept: HEMATOLOGY/ONCOLOGY | Facility: CLINIC | Age: 61
End: 2021-10-28
Payer: MEDICARE

## 2021-12-01 ENCOUNTER — OFFICE VISIT (OUTPATIENT)
Dept: RHEUMATOLOGY | Facility: CLINIC | Age: 61
End: 2021-12-01
Payer: MEDICARE

## 2021-12-01 VITALS
WEIGHT: 224.63 LBS | BODY MASS INDEX: 36.1 KG/M2 | DIASTOLIC BLOOD PRESSURE: 84 MMHG | SYSTOLIC BLOOD PRESSURE: 137 MMHG | HEART RATE: 64 BPM | HEIGHT: 66 IN

## 2021-12-01 DIAGNOSIS — Z79.899 HIGH RISK MEDICATION USE: Chronic | ICD-10-CM

## 2021-12-01 DIAGNOSIS — M05.79 RHEUMATOID ARTHRITIS INVOLVING MULTIPLE SITES WITH POSITIVE RHEUMATOID FACTOR: Chronic | ICD-10-CM

## 2021-12-01 DIAGNOSIS — M17.4 OTHER SECONDARY OSTEOARTHRITIS OF BOTH KNEES: ICD-10-CM

## 2021-12-01 PROCEDURE — 99999 PR PBB SHADOW E&M-EST. PATIENT-LVL IV: ICD-10-PCS | Mod: PBBFAC,,, | Performed by: INTERNAL MEDICINE

## 2021-12-01 PROCEDURE — 4010F PR ACE/ARB THEARPY RXD/TAKEN: ICD-10-PCS | Mod: CPTII,S$GLB,, | Performed by: INTERNAL MEDICINE

## 2021-12-01 PROCEDURE — 99214 PR OFFICE/OUTPT VISIT, EST, LEVL IV, 30-39 MIN: ICD-10-PCS | Mod: S$GLB,,, | Performed by: INTERNAL MEDICINE

## 2021-12-01 PROCEDURE — 99214 OFFICE O/P EST MOD 30 MIN: CPT | Mod: S$GLB,,, | Performed by: INTERNAL MEDICINE

## 2021-12-01 PROCEDURE — 99999 PR PBB SHADOW E&M-EST. PATIENT-LVL IV: CPT | Mod: PBBFAC,,, | Performed by: INTERNAL MEDICINE

## 2021-12-01 PROCEDURE — 4010F ACE/ARB THERAPY RXD/TAKEN: CPT | Mod: CPTII,S$GLB,, | Performed by: INTERNAL MEDICINE

## 2021-12-01 RX ORDER — FOLIC ACID 1 MG/1
1000 TABLET ORAL DAILY
Qty: 90 TABLET | Refills: 3 | Status: SHIPPED | OUTPATIENT
Start: 2021-12-01 | End: 2022-09-12 | Stop reason: SDUPTHER

## 2021-12-01 RX ORDER — TRIAMCINOLONE ACETONIDE 1 MG/G
CREAM TOPICAL 2 TIMES DAILY
COMMUNITY
Start: 2021-07-20

## 2021-12-01 RX ORDER — METHOTREXATE 2.5 MG/1
TABLET ORAL
Qty: 78 TABLET | Refills: 0 | Status: SHIPPED | OUTPATIENT
Start: 2021-12-01 | End: 2022-04-05 | Stop reason: SDUPTHER

## 2021-12-01 ASSESSMENT — ROUTINE ASSESSMENT OF PATIENT INDEX DATA (RAPID3)
AM STIFFNESS SCORE: 1, YES
FATIGUE SCORE: 5.5
PSYCHOLOGICAL DISTRESS SCORE: 4.4
PAIN SCORE: 5.5
PATIENT GLOBAL ASSESSMENT SCORE: 5.5
TOTAL RAPID3 SCORE: 4.55
MDHAQ FUNCTION SCORE: 0.8

## 2022-02-03 ENCOUNTER — TELEPHONE (OUTPATIENT)
Dept: HEMATOLOGY/ONCOLOGY | Facility: CLINIC | Age: 62
End: 2022-02-03
Payer: MEDICARE

## 2022-02-03 NOTE — TELEPHONE ENCOUNTER
Rec'd incoming call from patient to schedule an appointment. Patient received a recall letter. Patient agreed to schedule appointment for 3/30/2022 @ 10:40 and labs orders placed in [facility] system to be done at least one week prior. Patient verbalized understanding. Mailed appointment reminder.

## 2022-02-23 DIAGNOSIS — D84.9 IMMUNOSUPPRESSED STATUS: ICD-10-CM

## 2022-03-15 LAB
ALBUMIN SERPL-MCNC: 4.7 G/DL (ref 3.6–5.1)
ALBUMIN/GLOB SERPL: 1.8 (CALC) (ref 1–2.5)
ALP SERPL-CCNC: 95 U/L (ref 37–153)
ALT SERPL-CCNC: 20 U/L (ref 6–29)
AST SERPL-CCNC: 16 U/L (ref 10–35)
BASOPHILS # BLD AUTO: 89 CELLS/UL (ref 0–200)
BASOPHILS NFR BLD AUTO: 1 %
BILIRUB SERPL-MCNC: 0.4 MG/DL (ref 0.2–1.2)
BUN SERPL-MCNC: 16 MG/DL (ref 7–25)
BUN/CREAT SERPL: NORMAL (CALC) (ref 6–22)
CALCIUM SERPL-MCNC: 9.9 MG/DL (ref 8.6–10.4)
CHLORIDE SERPL-SCNC: 100 MMOL/L (ref 98–110)
CO2 SERPL-SCNC: 26 MMOL/L (ref 20–32)
CREAT SERPL-MCNC: 0.78 MG/DL (ref 0.5–0.99)
CRP SERPL-MCNC: 3 MG/L
EOSINOPHIL # BLD AUTO: 134 CELLS/UL (ref 15–500)
EOSINOPHIL NFR BLD AUTO: 1.5 %
ERYTHROCYTE [DISTWIDTH] IN BLOOD BY AUTOMATED COUNT: 12.9 % (ref 11–15)
ERYTHROCYTE [SEDIMENTATION RATE] IN BLOOD BY WESTERGREN METHOD: 17 MM/H
GLOBULIN SER CALC-MCNC: 2.6 G/DL (CALC) (ref 1.9–3.7)
GLUCOSE SERPL-MCNC: 95 MG/DL (ref 65–99)
HCT VFR BLD AUTO: 42.5 % (ref 35–45)
HGB BLD-MCNC: 14.1 G/DL (ref 11.7–15.5)
LYMPHOCYTES # BLD AUTO: 1700 CELLS/UL (ref 850–3900)
LYMPHOCYTES NFR BLD AUTO: 19.1 %
MCH RBC QN AUTO: 32.4 PG (ref 27–33)
MCHC RBC AUTO-ENTMCNC: 33.2 G/DL (ref 32–36)
MCV RBC AUTO: 97.7 FL (ref 80–100)
MONOCYTES # BLD AUTO: 774 CELLS/UL (ref 200–950)
MONOCYTES NFR BLD AUTO: 8.7 %
NEUTROPHILS # BLD AUTO: 6203 CELLS/UL (ref 1500–7800)
NEUTROPHILS NFR BLD AUTO: 69.7 %
PLATELET # BLD AUTO: 420 THOUSAND/UL (ref 140–400)
PMV BLD REES-ECKER: 9.1 FL (ref 7.5–12.5)
POTASSIUM SERPL-SCNC: 4.3 MMOL/L (ref 3.5–5.3)
PROT SERPL-MCNC: 7.3 G/DL (ref 6.1–8.1)
RBC # BLD AUTO: 4.35 MILLION/UL (ref 3.8–5.1)
SODIUM SERPL-SCNC: 137 MMOL/L (ref 135–146)
WBC # BLD AUTO: 8.9 THOUSAND/UL (ref 3.8–10.8)

## 2022-03-30 ENCOUNTER — OFFICE VISIT (OUTPATIENT)
Dept: HEMATOLOGY/ONCOLOGY | Facility: CLINIC | Age: 62
End: 2022-03-30
Payer: MEDICARE

## 2022-03-30 VITALS
HEIGHT: 68 IN | BODY MASS INDEX: 32.14 KG/M2 | SYSTOLIC BLOOD PRESSURE: 130 MMHG | HEART RATE: 75 BPM | DIASTOLIC BLOOD PRESSURE: 76 MMHG | WEIGHT: 212.06 LBS | OXYGEN SATURATION: 96 %

## 2022-03-30 DIAGNOSIS — M05.79 RHEUMATOID ARTHRITIS INVOLVING MULTIPLE SITES WITH POSITIVE RHEUMATOID FACTOR: ICD-10-CM

## 2022-03-30 DIAGNOSIS — Z85.3 PERSONAL HISTORY OF MALIGNANT NEOPLASM OF BREAST: Primary | ICD-10-CM

## 2022-03-30 PROCEDURE — 3008F PR BODY MASS INDEX (BMI) DOCUMENTED: ICD-10-PCS | Mod: CPTII,S$GLB,, | Performed by: INTERNAL MEDICINE

## 2022-03-30 PROCEDURE — 3078F DIAST BP <80 MM HG: CPT | Mod: CPTII,S$GLB,, | Performed by: INTERNAL MEDICINE

## 2022-03-30 PROCEDURE — 1159F MED LIST DOCD IN RCRD: CPT | Mod: CPTII,S$GLB,, | Performed by: INTERNAL MEDICINE

## 2022-03-30 PROCEDURE — 99214 PR OFFICE/OUTPT VISIT, EST, LEVL IV, 30-39 MIN: ICD-10-PCS | Mod: S$GLB,,, | Performed by: INTERNAL MEDICINE

## 2022-03-30 PROCEDURE — 3008F BODY MASS INDEX DOCD: CPT | Mod: CPTII,S$GLB,, | Performed by: INTERNAL MEDICINE

## 2022-03-30 PROCEDURE — 3075F SYST BP GE 130 - 139MM HG: CPT | Mod: CPTII,S$GLB,, | Performed by: INTERNAL MEDICINE

## 2022-03-30 PROCEDURE — 1159F PR MEDICATION LIST DOCUMENTED IN MEDICAL RECORD: ICD-10-PCS | Mod: CPTII,S$GLB,, | Performed by: INTERNAL MEDICINE

## 2022-03-30 PROCEDURE — 4010F ACE/ARB THERAPY RXD/TAKEN: CPT | Mod: CPTII,S$GLB,, | Performed by: INTERNAL MEDICINE

## 2022-03-30 PROCEDURE — 99999 PR PBB SHADOW E&M-EST. PATIENT-LVL IV: CPT | Mod: PBBFAC,,, | Performed by: INTERNAL MEDICINE

## 2022-03-30 PROCEDURE — 99214 OFFICE O/P EST MOD 30 MIN: CPT | Mod: S$GLB,,, | Performed by: INTERNAL MEDICINE

## 2022-03-30 PROCEDURE — 3075F PR MOST RECENT SYSTOLIC BLOOD PRESS GE 130-139MM HG: ICD-10-PCS | Mod: CPTII,S$GLB,, | Performed by: INTERNAL MEDICINE

## 2022-03-30 PROCEDURE — 99999 PR PBB SHADOW E&M-EST. PATIENT-LVL IV: ICD-10-PCS | Mod: PBBFAC,,, | Performed by: INTERNAL MEDICINE

## 2022-03-30 PROCEDURE — 4010F PR ACE/ARB THEARPY RXD/TAKEN: ICD-10-PCS | Mod: CPTII,S$GLB,, | Performed by: INTERNAL MEDICINE

## 2022-03-30 PROCEDURE — 3078F PR MOST RECENT DIASTOLIC BLOOD PRESSURE < 80 MM HG: ICD-10-PCS | Mod: CPTII,S$GLB,, | Performed by: INTERNAL MEDICINE

## 2022-03-30 NOTE — PROGRESS NOTES
Chief Complaint :   Breast Cancer.    Hx of Present illness :  Patient is a 62 y.o. year old female who presents to the clinic today for  Oncology followup. Dx'd to have invasive carcinoma involving right breast in . Underwent right mastectomy with reconstruction.. Grade 1, ER +; AK +, HER- 2 Neg; Node negattive. Stage 1. Low risk of recurrence on Oncotype Dx. Completed five years of Femara therapy. Routine Annual visit. .  Since last exam, has not had any surgery or new Rx. Under care of rheumatologist,  For management of rheumatoid arthritis;   Completed covid vaccination    Interval Hx: Former pt of Dr. Dillard's  First time seeing pt  Doing well  Pt now >10 yrs out from diagnosis  No SOB/CP  Appetite and weight stable       Allergies :    Review of patient's allergies indicates:   Allergen Reactions    Penicillins     Sulfa (sulfonamide antibiotics)     Flagyl [metronidazole hcl]        Occupation :  Homaker. Retired     Transfusion :  None    Menstrual & obstetric Hx :  3; Para 3.  Age of menarche: 13  Age of first pregnancy: 22  Lactation history:  Used Breast Pump  Age of menopause:  Hysterectomy at age 40 because of Heavy bleeding related to fifroids  HRT: premarin for 8 years    Present Meds :   Medication List with Changes/Refills   Current Medications    ALENDRONATE (FOSAMAX) 35 MG TABLET    Take 1 tablet (35 mg total) by mouth once a week.    CELECOXIB (CELEBREX) 200 MG CAPSULE    TAKE 1-2 CAPSULES (200-400 MG TOTAL) BY MOUTH DAILY AS NEEDED FOR PAIN.    DICLOFENAC SODIUM 1 % GEL        DULOXETINE (CYMBALTA) 60 MG CAPSULE    TAKE 1 CAPSULE BY MOUTH EVERY DAY    FLUCONAZOLE (DIFLUCAN) 150 MG TAB    TAKE 1 TABLET BY MOUTH EVERY 3 DAYS FOR 3 DAYS    FLUTICASONE PROPIONATE (FLONASE) 50 MCG/ACTUATION NASAL SPRAY    1 spray by Each Nostril route once daily.    FOLIC ACID (FOLVITE) 1 MG TABLET    Take 1 tablet (1,000 mcg total) by mouth once daily.    FUROSEMIDE (LASIX) 20 MG TABLET         HYDROXYCHLOROQUINE (PLAQUENIL) 200 MG TABLET    TAKE 1 TABLET BY MOUTH  TWICE DAILY    LEVOTHYROXINE (SYNTHROID) 125 MCG TABLET    TAKE 1 TABLET BY MOUTH  EVERY MORNING BEFORE  BREAKFAST    METHOTREXATE 2.5 MG TAB    TAKE 6 TABLETS BY MOUTH  EVERY 7 DAYS    METOPROLOL SUCCINATE (TOPROL-XL) 50 MG 24 HR TABLET    Take 50 mg by mouth once daily.    NITROFURANTOIN, MACROCRYSTAL-MONOHYDRATE, (MACROBID) 100 MG CAPSULE    Take 100 mg by mouth 2 (two) times daily.    PANTOPRAZOLE (PROTONIX) 40 MG TABLET        POTASSIUM CHLORIDE (MICRO-K) 10 MEQ CPSR    Take 10 mEq by mouth once daily.    TELMISARTAN (MICARDIS) 20 MG TAB    Take 20 mg by mouth once daily.    TRIAMCINOLONE ACETONIDE 0.1% (KENALOG) 0.1 % CREAM    Apply topically 2 (two) times daily.       Past Medical Hx :  Carcinoma Right Breast; Hypothyroidism, Rheumatoid arthritis, Hypertension. No Hx of PUD, Hepatitis, Liver disease, TB, Sarcoid, Lupus, CVA, seizure disorder, DVT, PE> No past hx of Chemotherapy or radiation therapy    Past Medical Hx :  Past Medical History:   Diagnosis Date    Abnormal Pap smear     Breast CA 2010    right    Dry mouth     Hypertension     Hypothyroidism     PONV (postoperative nausea and vomiting)     RA (rheumatoid arthritis)     Rheumatoid arteritis     SVT (supraventricular tachycardia)     Usual hyperplasia of lactiferous duct        Travel Hx :  N/A    Immunization :  Immunization History   Administered Date(s) Administered    COVID-19, MRNA, LN-S, PF (MODERNA FULL 0.5 ML DOSE) 02/02/2021, 02/02/2021, 03/02/2021, 03/02/2021, 11/07/2021    Influenza 11/16/2012, 09/27/2013, 10/09/2019, 09/18/2020    Influenza (FLUBLOK) - Quadrivalent - Recombinant - PF *Preferred* (egg allergy) 10/09/2019, 10/09/2019    Influenza - High Dose - PF (65 years and older) 12/19/2014, 12/19/2014, 10/30/2015, 10/30/2015    Influenza - Quadrivalent - MDCK - PF 09/18/2020, 09/18/2020    Influenza - Quadrivalent - PF *Preferred* (6 months  and older) 12/05/2018, 12/05/2018    Influenza - Trivalent (ADULT) 11/16/2012, 09/27/2013, 09/27/2013, 12/22/2016, 12/22/2016, 11/02/2017, 11/02/2017    Pneumococcal Conjugate - 13 Valent 08/20/2021       Family Hx :  Family History   Problem Relation Age of Onset    Alzheimer's disease Mother     Lupus Father     Parkinsonism Father     Lupus Daughter     Cancer Cousin         lymphoma    Breast cancer Neg Hx     Colon cancer Neg Hx     Ovarian cancer Neg Hx        Social Hx :  Social History     Socioeconomic History    Marital status:    Tobacco Use    Smoking status: Never Smoker    Smokeless tobacco: Never Used   Substance and Sexual Activity    Alcohol use: No    Drug use: No    Sexual activity: Not Currently       Surgery : LENY with BSO; Breast Bx; Right mastectomy with recontruction. Partial Thyroidectomy. Colonoscopy    Symptoms :    Review of Systems   Constitutional: Negative for chills, fever, malaise/fatigue and weight loss.   HENT: Negative for congestion, hearing loss, nosebleeds and sinus pain.    Eyes: Negative for blurred vision.   Respiratory: Negative for cough and shortness of breath.    Cardiovascular: Negative for chest pain, palpitations and leg swelling.   Gastrointestinal: Negative for abdominal pain, blood in stool, constipation, diarrhea, nausea and vomiting.   Genitourinary: Negative for hematuria and urgency.   Musculoskeletal: Negative for back pain, joint pain, myalgias and neck pain.   Skin: Negative for itching and rash.   Neurological: Negative for dizziness, weakness and headaches.   Endo/Heme/Allergies: Negative for polydipsia. Does not bruise/bleed easily.   Psychiatric/Behavioral: Negative for depression and memory loss. The patient is not nervous/anxious and does not have insomnia.        Physical Exam :     Physical Exam   Constitutional: She is oriented to person, place, and time. She appears well-developed and well-nourished.   HENT:   Head:  Normocephalic.   Eyes: Conjunctivae and lids are normal.  No scleral icterus.   Neck: Normal range of motion. Neck supple. No thyromegaly present.   Cardiovascular: Normal rate, regular rhythm and normal heart sounds.    No murmur heard.  Pulmonary/Chest: Breath sounds normal. She has no wheezes. She has no rales.   Rt reconstructed breast- mild TTP, no masses or axillary LAD noted  Left breast- no masses, skin changes or axillary LAD noted   Abdominal: Soft. Bowel sounds are normal. There is no tenderness. There is no rebound and no guarding.   Musculoskeletal: Normal range of motion. She exhibits no edema and no tenderness.   Lymphadenopathy:     She has no cervical adenopathy.     She has no axillary adenopathy.        Right: No supraclavicular adenopathy present.        Left: No supraclavicular adenopathy present.   Neurological: She is alert and oriented to person, place, and time. No cranial nerve deficit. Coordination normal.   Skin: Skin is warm and dry. No ecchymosis, no petechiae and no rash noted. No erythema.   Psychiatric: She has a normal mood and affect.       Labs & Imaging :  02/23/21 : Hgb 13.3; Hct 38.8;  MCV 96; Platelets 315,000 ANC 2,640  NFBS 91; Cr.0.71  eGFR 93; Ca 9.5; Bili 0.5; Liver enzymes normal.  CEA 2.3; CA 27.29 Antigen : 21.      02/27/2020 : NFBS 74; Cr.0.92; eGFR 69; ca 10.4; bili 0.4 liver enzymes normal. Hgb 14.2; Hct 43.0. MCV 93.9; Platelets 369,000 ANC 5,234. CEA and CA 27,29 antigen normal.     Mammo 8/16/21 left BI-RADS Category 1: Negative    1. Personal history of malignant neoplasm of breast     2. Rheumatoid arthritis involving multiple sites with positive rheumatoid factor         Plan 1.  Pt previously f/b Dr. Dillard.   She has hx of Stage 1 rt breast CA s/p right mastectomy with reconstruction.. Grade 1, ER +; OH +, HER- 2 Neg; Node negattive  Completed five years of Hormonal Therapy  Last Mammo 8/16/21 left BI-RADS Category 1: Negative  She is > 10 yrs out from her  diagnosis  Doing well clinically  Pt can follow-up with PCP for routine follow-ups  Continue annual mammogram  Next follow-up Mammo [id number]/20220  Advised to cont SBE  Follow-up with Oncology prn   Pt agreeable with plan     2. stable  F/b Rheumatology

## 2022-04-05 ENCOUNTER — OFFICE VISIT (OUTPATIENT)
Dept: RHEUMATOLOGY | Facility: CLINIC | Age: 62
End: 2022-04-05
Payer: MEDICARE

## 2022-04-05 VITALS
BODY MASS INDEX: 32.77 KG/M2 | DIASTOLIC BLOOD PRESSURE: 85 MMHG | SYSTOLIC BLOOD PRESSURE: 136 MMHG | HEART RATE: 68 BPM | HEIGHT: 68 IN | WEIGHT: 216.25 LBS

## 2022-04-05 DIAGNOSIS — Z79.899 HIGH RISK MEDICATION USE: Chronic | ICD-10-CM

## 2022-04-05 DIAGNOSIS — M05.79 RHEUMATOID ARTHRITIS INVOLVING MULTIPLE SITES WITH POSITIVE RHEUMATOID FACTOR: Chronic | ICD-10-CM

## 2022-04-05 DIAGNOSIS — M17.4 OTHER SECONDARY OSTEOARTHRITIS OF BOTH KNEES: ICD-10-CM

## 2022-04-05 PROCEDURE — 3075F SYST BP GE 130 - 139MM HG: CPT | Mod: CPTII,S$GLB,, | Performed by: INTERNAL MEDICINE

## 2022-04-05 PROCEDURE — 1160F RVW MEDS BY RX/DR IN RCRD: CPT | Mod: CPTII,S$GLB,, | Performed by: INTERNAL MEDICINE

## 2022-04-05 PROCEDURE — 3008F PR BODY MASS INDEX (BMI) DOCUMENTED: ICD-10-PCS | Mod: CPTII,S$GLB,, | Performed by: INTERNAL MEDICINE

## 2022-04-05 PROCEDURE — 99214 OFFICE O/P EST MOD 30 MIN: CPT | Mod: S$GLB,,, | Performed by: INTERNAL MEDICINE

## 2022-04-05 PROCEDURE — 3079F DIAST BP 80-89 MM HG: CPT | Mod: CPTII,S$GLB,, | Performed by: INTERNAL MEDICINE

## 2022-04-05 PROCEDURE — 99999 PR PBB SHADOW E&M-EST. PATIENT-LVL III: CPT | Mod: PBBFAC,,, | Performed by: INTERNAL MEDICINE

## 2022-04-05 PROCEDURE — 4010F PR ACE/ARB THEARPY RXD/TAKEN: ICD-10-PCS | Mod: CPTII,S$GLB,, | Performed by: INTERNAL MEDICINE

## 2022-04-05 PROCEDURE — 3075F PR MOST RECENT SYSTOLIC BLOOD PRESS GE 130-139MM HG: ICD-10-PCS | Mod: CPTII,S$GLB,, | Performed by: INTERNAL MEDICINE

## 2022-04-05 PROCEDURE — 3008F BODY MASS INDEX DOCD: CPT | Mod: CPTII,S$GLB,, | Performed by: INTERNAL MEDICINE

## 2022-04-05 PROCEDURE — 4010F ACE/ARB THERAPY RXD/TAKEN: CPT | Mod: CPTII,S$GLB,, | Performed by: INTERNAL MEDICINE

## 2022-04-05 PROCEDURE — 1159F MED LIST DOCD IN RCRD: CPT | Mod: CPTII,S$GLB,, | Performed by: INTERNAL MEDICINE

## 2022-04-05 PROCEDURE — 1160F PR REVIEW ALL MEDS BY PRESCRIBER/CLIN PHARMACIST DOCUMENTED: ICD-10-PCS | Mod: CPTII,S$GLB,, | Performed by: INTERNAL MEDICINE

## 2022-04-05 PROCEDURE — 99999 PR PBB SHADOW E&M-EST. PATIENT-LVL III: ICD-10-PCS | Mod: PBBFAC,,, | Performed by: INTERNAL MEDICINE

## 2022-04-05 PROCEDURE — 99214 PR OFFICE/OUTPT VISIT, EST, LEVL IV, 30-39 MIN: ICD-10-PCS | Mod: S$GLB,,, | Performed by: INTERNAL MEDICINE

## 2022-04-05 PROCEDURE — 1159F PR MEDICATION LIST DOCUMENTED IN MEDICAL RECORD: ICD-10-PCS | Mod: CPTII,S$GLB,, | Performed by: INTERNAL MEDICINE

## 2022-04-05 PROCEDURE — 3079F PR MOST RECENT DIASTOLIC BLOOD PRESSURE 80-89 MM HG: ICD-10-PCS | Mod: CPTII,S$GLB,, | Performed by: INTERNAL MEDICINE

## 2022-04-05 RX ORDER — TOBRAMYCIN 3 MG/ML
1 SOLUTION/ DROPS OPHTHALMIC 4 TIMES DAILY
COMMUNITY
Start: 2022-02-14

## 2022-04-05 RX ORDER — LEVOTHYROXINE SODIUM 137 UG/1
TABLET ORAL
COMMUNITY
Start: 2022-03-11

## 2022-04-05 RX ORDER — METHOTREXATE 2.5 MG/1
TABLET ORAL
Qty: 78 TABLET | Refills: 0 | Status: SHIPPED | OUTPATIENT
Start: 2022-04-05 | End: 2022-08-17

## 2022-04-05 NOTE — ASSESSMENT & PLAN NOTE
Mechanical knee pain has progressed and has now significantly decreased her activity so she needs to proceed with TKP and has seen Dr Fleming to do this

## 2022-04-05 NOTE — ASSESSMENT & PLAN NOTE
No treatment related adverse effects; will continue to monitor for drug toxicity      will repeat lab for MTX in 3 mo

## 2022-04-05 NOTE — ASSESSMENT & PLAN NOTE
RA clinically controlled with MTX and HCQ but knee OA now limiting activity     Will cont current DMARD therapy of RA and she will plan TKP for OA    RTC me after surgery in August

## 2022-04-05 NOTE — PROGRESS NOTES
"Subjective:       Patient ID: Sherrell Yip is a 62 y.o. female.    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 [place]      CCP+ RA with positive ANTONIA    MTX since Oct 2015     mg/d since April 2016 -Nov 2016 when switched to LFA  LFA added Nov 2016 then stopped 2017 due to elevated AST/ALT  Prednisone bridge 1 month, July 2015  NSAID: hx Celebrex; hx ibuprofen caused stomach ache  HCQ since  June 2017     Hx breast cancer 2010       Currently:   bid  MTX 15 mg / week and 1mg folic acid everyday (states she was having difficulty getting the folic acid)  Celebrex 200 / d     A lot of pain in knees, L > R  Walking is limited  Some knee pain at night  Wears elastic knee braces during day  Ok to sit in recliner and watch TV  Previously failed gabapentin; made her too sleepy  Had MRI of both knees showing diffuse OA  Had ablation that did not help knee in March  She  went to [facility] who has scheduled TKP on L in August 1st ; has to wait 3 mo after ablation    Review of Systems      Objective:   /85   Pulse 68   Ht 5' 7.5" (1.715 m)   Wt 98.1 kg (216 lb 4.3 oz)   BMI 33.37 kg/m²      Physical Exam      4/15/2021 7/16/2021 12/1/2021 4/5/2022   Tender (VALDERRAMA-28) 2 / 28 1 / 28  2 / 28  2 / 28    Swollen (VALDERRAMA-28) 0 / 28 1 / 28  0 / 28  0 / 28    Provider Global -- 10 mm 25 mm 20 mm   Patient Global 50 mm 30 mm 55 mm --   ESR -- 9 mm/hr 14 mm/hr 17 mm/hr   CRP -- -- 2.6 mg/L 3 mg/L   VALDERRAMA-28 (ESR) -- 2.8 (Low disease activity) 3.41 (Moderate disease activity) --   VALDERRAMA-28 (CRP) -- -- 2.98 (Low disease activity) --   CDAI Score -- 6  10  --     Lab Results   Component Value Date    SEDRATE 17 03/14/2022          Assessment:       1. Rheumatoid arthritis involving multiple sites with positive rheumatoid factor    2. High risk medication use    3. Other secondary osteoarthritis of both knees            Plan:       Problem List Items Addressed This Visit        " Active Problems    Rheumatoid arthritis involving multiple sites with positive rheumatoid factor (Chronic)     RA clinically controlled with MTX and HCQ but knee OA now limiting activity     Will cont current DMARD therapy of RA and she will plan TKP for OA    RTC me after surgery in August            Relevant Medications    methotrexate 2.5 MG Tab    High risk medication use (Chronic)     No treatment related adverse effects; will continue to monitor for drug toxicity      will repeat lab for MTX in 3 mo            Osteoarthritis of both knees     Mechanical knee pain has progressed and has now significantly decreased her activity so she needs to proceed with TKP and has seen Dr Fleming to do this

## 2022-04-22 ENCOUNTER — TELEPHONE (OUTPATIENT)
Dept: RHEUMATOLOGY | Facility: CLINIC | Age: 62
End: 2022-04-22
Payer: MEDICARE

## 2022-06-16 DIAGNOSIS — B37.31 VAGINAL YEAST INFECTION: ICD-10-CM

## 2022-06-16 DIAGNOSIS — Z79.899 HIGH RISK MEDICATION USE: ICD-10-CM

## 2022-06-16 DIAGNOSIS — M05.79 RHEUMATOID ARTHRITIS INVOLVING MULTIPLE SITES WITH POSITIVE RHEUMATOID FACTOR: ICD-10-CM

## 2022-06-16 RX ORDER — FLUCONAZOLE 150 MG/1
TABLET ORAL
Qty: 6 TABLET | Refills: 0 | Status: SHIPPED | OUTPATIENT
Start: 2022-06-16 | End: 2022-07-26

## 2022-06-16 NOTE — TELEPHONE ENCOUNTER
----- Message from Alba Blandon MA sent at 6/16/2022 12:11 PM CDT -----  [facility] for [facility] July labs. I will print them out and mail them to the patient and work on getting her an appointment for mid September  Alba

## 2022-08-17 DIAGNOSIS — M05.79 RHEUMATOID ARTHRITIS INVOLVING MULTIPLE SITES WITH POSITIVE RHEUMATOID FACTOR: Chronic | ICD-10-CM

## 2022-08-17 RX ORDER — METHOTREXATE 2.5 MG/1
TABLET ORAL
Qty: 78 TABLET | Refills: 0 | Status: SHIPPED | OUTPATIENT
Start: 2022-08-17 | End: 2022-10-31

## 2022-08-17 NOTE — TELEPHONE ENCOUNTER
I do not see labs but she usually does them at [facility] can you check with her to see if she did them recently?

## 2022-08-18 LAB
ALBUMIN SERPL-MCNC: 4.4 G/DL (ref 3.6–5.1)
ALBUMIN/GLOB SERPL: 1.7 (CALC) (ref 1–2.5)
ALP SERPL-CCNC: 122 U/L (ref 37–153)
ALT SERPL-CCNC: 11 U/L (ref 6–29)
AST SERPL-CCNC: 16 U/L (ref 10–35)
BASOPHILS # BLD AUTO: 81 CELLS/UL (ref 0–200)
BASOPHILS NFR BLD AUTO: 1.5 %
BILIRUB SERPL-MCNC: 0.4 MG/DL (ref 0.2–1.2)
BUN SERPL-MCNC: 10 MG/DL (ref 7–25)
BUN/CREAT SERPL: ABNORMAL (CALC) (ref 6–22)
CALCIUM SERPL-MCNC: 9.3 MG/DL (ref 8.6–10.4)
CHLORIDE SERPL-SCNC: 101 MMOL/L (ref 98–110)
CO2 SERPL-SCNC: 25 MMOL/L (ref 20–32)
CREAT SERPL-MCNC: 0.8 MG/DL (ref 0.5–1.05)
CRP SERPL-MCNC: 6.7 MG/L
EGFR: 84 ML/MIN/1.73M2
EOSINOPHIL # BLD AUTO: 178 CELLS/UL (ref 15–500)
EOSINOPHIL NFR BLD AUTO: 3.3 %
ERYTHROCYTE [DISTWIDTH] IN BLOOD BY AUTOMATED COUNT: 12.8 % (ref 11–15)
ERYTHROCYTE [SEDIMENTATION RATE] IN BLOOD BY WESTERGREN METHOD: 33 MM/H
GLOBULIN SER CALC-MCNC: 2.6 G/DL (CALC) (ref 1.9–3.7)
GLUCOSE SERPL-MCNC: 109 MG/DL (ref 65–99)
HCT VFR BLD AUTO: 38.6 % (ref 35–45)
HGB BLD-MCNC: 12.8 G/DL (ref 11.7–15.5)
LYMPHOCYTES # BLD AUTO: 1102 CELLS/UL (ref 850–3900)
LYMPHOCYTES NFR BLD AUTO: 20.4 %
MCH RBC QN AUTO: 31.1 PG (ref 27–33)
MCHC RBC AUTO-ENTMCNC: 33.2 G/DL (ref 32–36)
MCV RBC AUTO: 93.7 FL (ref 80–100)
MONOCYTES # BLD AUTO: 583 CELLS/UL (ref 200–950)
MONOCYTES NFR BLD AUTO: 10.8 %
NEUTROPHILS # BLD AUTO: 3456 CELLS/UL (ref 1500–7800)
NEUTROPHILS NFR BLD AUTO: 64 %
PLATELET # BLD AUTO: 358 THOUSAND/UL (ref 140–400)
PMV BLD REES-ECKER: 8.8 FL (ref 7.5–12.5)
POTASSIUM SERPL-SCNC: 4.8 MMOL/L (ref 3.5–5.3)
PROT SERPL-MCNC: 7 G/DL (ref 6.1–8.1)
RBC # BLD AUTO: 4.12 MILLION/UL (ref 3.8–5.1)
SODIUM SERPL-SCNC: 138 MMOL/L (ref 135–146)
WBC # BLD AUTO: 5.4 THOUSAND/UL (ref 3.8–10.8)

## 2022-09-12 ENCOUNTER — OFFICE VISIT (OUTPATIENT)
Dept: RHEUMATOLOGY | Facility: CLINIC | Age: 62
End: 2022-09-12
Payer: MEDICARE

## 2022-09-12 VITALS
SYSTOLIC BLOOD PRESSURE: 126 MMHG | BODY MASS INDEX: 31.71 KG/M2 | WEIGHT: 209.19 LBS | HEIGHT: 68 IN | DIASTOLIC BLOOD PRESSURE: 81 MMHG

## 2022-09-12 DIAGNOSIS — Z79.899 HIGH RISK MEDICATION USE: Chronic | ICD-10-CM

## 2022-09-12 DIAGNOSIS — M05.79 RHEUMATOID ARTHRITIS INVOLVING MULTIPLE SITES WITH POSITIVE RHEUMATOID FACTOR: Chronic | ICD-10-CM

## 2022-09-12 DIAGNOSIS — M17.4 OTHER SECONDARY OSTEOARTHRITIS OF BOTH KNEES: ICD-10-CM

## 2022-09-12 PROBLEM — Z85.3 PERSONAL HISTORY OF MALIGNANT NEOPLASM OF BREAST: Status: RESOLVED | Noted: 2018-02-22 | Resolved: 2022-09-12

## 2022-09-12 PROCEDURE — 1159F PR MEDICATION LIST DOCUMENTED IN MEDICAL RECORD: ICD-10-PCS | Mod: CPTII,S$GLB,, | Performed by: INTERNAL MEDICINE

## 2022-09-12 PROCEDURE — 99214 OFFICE O/P EST MOD 30 MIN: CPT | Mod: S$GLB,,, | Performed by: INTERNAL MEDICINE

## 2022-09-12 PROCEDURE — 4010F PR ACE/ARB THEARPY RXD/TAKEN: ICD-10-PCS | Mod: CPTII,S$GLB,, | Performed by: INTERNAL MEDICINE

## 2022-09-12 PROCEDURE — 3008F PR BODY MASS INDEX (BMI) DOCUMENTED: ICD-10-PCS | Mod: CPTII,S$GLB,, | Performed by: INTERNAL MEDICINE

## 2022-09-12 PROCEDURE — 1159F MED LIST DOCD IN RCRD: CPT | Mod: CPTII,S$GLB,, | Performed by: INTERNAL MEDICINE

## 2022-09-12 PROCEDURE — 99999 PR PBB SHADOW E&M-EST. PATIENT-LVL III: CPT | Mod: PBBFAC,,, | Performed by: INTERNAL MEDICINE

## 2022-09-12 PROCEDURE — 3008F BODY MASS INDEX DOCD: CPT | Mod: CPTII,S$GLB,, | Performed by: INTERNAL MEDICINE

## 2022-09-12 PROCEDURE — 3074F PR MOST RECENT SYSTOLIC BLOOD PRESSURE < 130 MM HG: ICD-10-PCS | Mod: CPTII,S$GLB,, | Performed by: INTERNAL MEDICINE

## 2022-09-12 PROCEDURE — 99999 PR PBB SHADOW E&M-EST. PATIENT-LVL III: ICD-10-PCS | Mod: PBBFAC,,, | Performed by: INTERNAL MEDICINE

## 2022-09-12 PROCEDURE — 1160F RVW MEDS BY RX/DR IN RCRD: CPT | Mod: CPTII,S$GLB,, | Performed by: INTERNAL MEDICINE

## 2022-09-12 PROCEDURE — 1160F PR REVIEW ALL MEDS BY PRESCRIBER/CLIN PHARMACIST DOCUMENTED: ICD-10-PCS | Mod: CPTII,S$GLB,, | Performed by: INTERNAL MEDICINE

## 2022-09-12 PROCEDURE — 4010F ACE/ARB THERAPY RXD/TAKEN: CPT | Mod: CPTII,S$GLB,, | Performed by: INTERNAL MEDICINE

## 2022-09-12 PROCEDURE — 3079F PR MOST RECENT DIASTOLIC BLOOD PRESSURE 80-89 MM HG: ICD-10-PCS | Mod: CPTII,S$GLB,, | Performed by: INTERNAL MEDICINE

## 2022-09-12 PROCEDURE — 3079F DIAST BP 80-89 MM HG: CPT | Mod: CPTII,S$GLB,, | Performed by: INTERNAL MEDICINE

## 2022-09-12 PROCEDURE — 3074F SYST BP LT 130 MM HG: CPT | Mod: CPTII,S$GLB,, | Performed by: INTERNAL MEDICINE

## 2022-09-12 PROCEDURE — 99214 PR OFFICE/OUTPT VISIT, EST, LEVL IV, 30-39 MIN: ICD-10-PCS | Mod: S$GLB,,, | Performed by: INTERNAL MEDICINE

## 2022-09-12 RX ORDER — FOLIC ACID 1 MG/1
1000 TABLET ORAL DAILY
Qty: 90 TABLET | Refills: 3 | Status: SHIPPED | OUTPATIENT
Start: 2022-09-12 | End: 2023-07-14 | Stop reason: SDUPTHER

## 2022-09-12 ASSESSMENT — ROUTINE ASSESSMENT OF PATIENT INDEX DATA (RAPID3)
PATIENT GLOBAL ASSESSMENT SCORE: 5
PAIN SCORE: 3
TOTAL RAPID3 SCORE: 2.67
FATIGUE SCORE: 5
AM STIFFNESS SCORE: 1, YES
MDHAQ FUNCTION SCORE: 0
WHEN YOU AWAKENED IN THE MORNING OVER THE LAST WEEK, PLEASE INDICATE THE AMOUNT OF TIME IT TAKES UNTIL YOU ARE AS LIMBER AS YOU WILL BE FOR THE DAY: 3 HOURS
PSYCHOLOGICAL DISTRESS SCORE: 5.5

## 2022-09-12 NOTE — PROGRESS NOTES
"Subjective:       Patient ID: Sherrell Yip is a 62 y.o. female.    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[place]      CCP+ RA with positive ANTONIA    MTX since Oct 2015     mg/d since April 2016 -Nov 2016 when switched to LFA  LFA added Nov 2016 then stopped 2017 due to elevated AST/ALT  Prednisone bridge 1 month, July 2015  NSAID: hx Celebrex; hx ibuprofen caused stomach ache  HCQ since  June 2017     Hx breast cancer 2010      Currently:   bid  MTX 15 mg / week and 1mg folic acid everyday (states she was having difficulty getting the folic acid)  Celebrex    Rented scooter in [place]  Had L TKP Aug 1, Dr Fleming; good results    Labs ok on 8/17    Review of Systems   Constitutional:  Negative for fatigue, fever and unexpected weight change.   HENT:  Negative for mouth sores and trouble swallowing.         Dry mouth   Eyes:  Negative for redness.        Dry eyes   Respiratory:  Negative for cough and shortness of breath.    Cardiovascular:  Negative for chest pain.   Gastrointestinal:  Negative for abdominal pain, constipation and diarrhea.   Genitourinary:  Negative for dysuria and genital sores.   Skin:  Negative for color change and rash.   Neurological:  Negative for headaches.   Hematological:  Negative for adenopathy. Does not bruise/bleed easily.   Psychiatric/Behavioral:  Negative for sleep disturbance.        Objective:   /81   Ht 5' 7.5" (1.715 m)   Wt 94.9 kg (209 lb 3.5 oz)   BMI 32.28 kg/m²      Physical Exam   Musculoskeletal:      Comments: Well healed scar L knee  OA changes R knee         7/16/2021 12/1/2021 4/5/2022 9/12/2022   Tender (VALDERRAMA-28) 1 / 28  2 / 28  2 / 28 1 / 28    Swollen (VALDERRAMA-28) 1 / 28  0 / 28  0 / 28 1 / 28    Provider Global 10 mm 25 mm 20 mm 0 mm   Patient Global 30 mm 55 mm -- 50 mm   ESR 9 mm/hr 14 mm/hr 17 mm/hr 33 mm/hr   CRP -- 2.6 mg/L 3 mg/L 6.7 mg/L   VALDERRAMA-28 (ESR) 2.8 (Low disease activity) 3.41 (Moderate " disease activity) -- 3.99 (Moderate disease activity)   VALDERRAMA-28 (CRP) -- 2.98 (Low disease activity) -- 3.23 (Moderate disease activity)   CDAI Score 6  10  -- 7      Lab Results   Component Value Date    SEDRATE 33 (H) 08/17/2022          Assessment:       1. Rheumatoid arthritis involving multiple sites with positive rheumatoid factor    2. High risk medication use    3. Other secondary osteoarthritis of both knees            Plan:       Problem List Items Addressed This Visit          Active Problems    Rheumatoid arthritis involving multiple sites with positive rheumatoid factor (Chronic)     RA is controlled with MTX and HCQ and now has had successful TKP for secondary OA    Will cont MTX   Will get surgery R knee  Cont lab monitoring for meds  RTC me 6 mo         Relevant Medications    folic acid (FOLVITE) 1 MG tablet    High risk medication use (Chronic)     No treatment related adverse effects; will continue to monitor for drug toxicity           Osteoarthritis of both knees     S/p L TKP; anticipates R TKP

## 2022-09-12 NOTE — ASSESSMENT & PLAN NOTE
RA is controlled with MTX and HCQ and now has had successful TKP for secondary OA    Will cont MTX   Will get surgery R knee  Cont lab monitoring for meds  RTC me 6 mo

## 2022-11-14 DIAGNOSIS — M05.79 RHEUMATOID ARTHRITIS INVOLVING MULTIPLE SITES WITH POSITIVE RHEUMATOID FACTOR: Primary | ICD-10-CM

## 2022-11-29 LAB
ALBUMIN SERPL-MCNC: 4.7 G/DL (ref 3.6–5.1)
ALBUMIN/GLOB SERPL: 2 (CALC) (ref 1–2.5)
ALP SERPL-CCNC: 84 U/L (ref 37–153)
ALT SERPL-CCNC: 17 U/L (ref 6–29)
AST SERPL-CCNC: 12 U/L (ref 10–35)
BASOPHILS # BLD AUTO: 9 CELLS/UL (ref 0–200)
BASOPHILS NFR BLD AUTO: 0.1 %
BILIRUB SERPL-MCNC: 0.4 MG/DL (ref 0.2–1.2)
BUN SERPL-MCNC: 12 MG/DL (ref 7–25)
BUN/CREAT SERPL: NORMAL (CALC) (ref 6–22)
CALCIUM SERPL-MCNC: 10.2 MG/DL (ref 8.6–10.4)
CHLORIDE SERPL-SCNC: 102 MMOL/L (ref 98–110)
CO2 SERPL-SCNC: 29 MMOL/L (ref 20–32)
CREAT SERPL-MCNC: 0.83 MG/DL (ref 0.5–1.05)
EGFR: 80 ML/MIN/1.73M2
EOSINOPHIL # BLD AUTO: 0 CELLS/UL (ref 15–500)
EOSINOPHIL NFR BLD AUTO: 0 %
ERYTHROCYTE [DISTWIDTH] IN BLOOD BY AUTOMATED COUNT: 13.4 % (ref 11–15)
ERYTHROCYTE [SEDIMENTATION RATE] IN BLOOD BY WESTERGREN METHOD: 6 MM/H
GLOBULIN SER CALC-MCNC: 2.4 G/DL (CALC) (ref 1.9–3.7)
GLUCOSE SERPL-MCNC: 131 MG/DL (ref 65–139)
HCT VFR BLD AUTO: 39.6 % (ref 35–45)
HGB BLD-MCNC: 13.4 G/DL (ref 11.7–15.5)
LYMPHOCYTES # BLD AUTO: 774 CELLS/UL (ref 850–3900)
LYMPHOCYTES NFR BLD AUTO: 8.7 %
MCH RBC QN AUTO: 31.2 PG (ref 27–33)
MCHC RBC AUTO-ENTMCNC: 33.8 G/DL (ref 32–36)
MCV RBC AUTO: 92.3 FL (ref 80–100)
MONOCYTES # BLD AUTO: 258 CELLS/UL (ref 200–950)
MONOCYTES NFR BLD AUTO: 2.9 %
NEUTROPHILS # BLD AUTO: 7859 CELLS/UL (ref 1500–7800)
NEUTROPHILS NFR BLD AUTO: 88.3 %
PLATELET # BLD AUTO: 356 THOUSAND/UL (ref 140–400)
PMV BLD REES-ECKER: 9 FL (ref 7.5–12.5)
POTASSIUM SERPL-SCNC: 4.3 MMOL/L (ref 3.5–5.3)
PROT SERPL-MCNC: 7.1 G/DL (ref 6.1–8.1)
RBC # BLD AUTO: 4.29 MILLION/UL (ref 3.8–5.1)
SODIUM SERPL-SCNC: 142 MMOL/L (ref 135–146)
WBC # BLD AUTO: 8.9 THOUSAND/UL (ref 3.8–10.8)

## 2023-01-17 DIAGNOSIS — M05.79 RHEUMATOID ARTHRITIS INVOLVING MULTIPLE SITES WITH POSITIVE RHEUMATOID FACTOR: Chronic | ICD-10-CM

## 2023-01-17 RX ORDER — HYDROXYCHLOROQUINE SULFATE 200 MG/1
200 TABLET, FILM COATED ORAL 2 TIMES DAILY
Qty: 180 TABLET | Refills: 3 | Status: SHIPPED | OUTPATIENT
Start: 2023-01-17 | End: 2023-11-15

## 2023-02-10 LAB
ALBUMIN SERPL-MCNC: 4.4 G/DL (ref 3.6–5.1)
ALBUMIN/GLOB SERPL: 1.6 (CALC) (ref 1–2.5)
ALP SERPL-CCNC: 141 U/L (ref 37–153)
ALT SERPL-CCNC: 11 U/L (ref 6–29)
AST SERPL-CCNC: 15 U/L (ref 10–35)
BASOPHILS # BLD AUTO: 73 CELLS/UL (ref 0–200)
BASOPHILS NFR BLD AUTO: 1.4 %
BILIRUB SERPL-MCNC: 0.4 MG/DL (ref 0.2–1.2)
BUN SERPL-MCNC: 8 MG/DL (ref 7–25)
BUN/CREAT SERPL: NORMAL (CALC) (ref 6–22)
CALCIUM SERPL-MCNC: 9.4 MG/DL (ref 8.6–10.4)
CHLORIDE SERPL-SCNC: 102 MMOL/L (ref 98–110)
CO2 SERPL-SCNC: 27 MMOL/L (ref 20–32)
CREAT SERPL-MCNC: 0.77 MG/DL (ref 0.5–1.05)
EGFR: 87 ML/MIN/1.73M2
EOSINOPHIL # BLD AUTO: 172 CELLS/UL (ref 15–500)
EOSINOPHIL NFR BLD AUTO: 3.3 %
ERYTHROCYTE [DISTWIDTH] IN BLOOD BY AUTOMATED COUNT: 13 % (ref 11–15)
ERYTHROCYTE [SEDIMENTATION RATE] IN BLOOD BY WESTERGREN METHOD: 19 MM/H
GLOBULIN SER CALC-MCNC: 2.7 G/DL (CALC) (ref 1.9–3.7)
GLUCOSE SERPL-MCNC: 87 MG/DL (ref 65–99)
HCT VFR BLD AUTO: 35.3 % (ref 35–45)
HGB BLD-MCNC: 11.8 G/DL (ref 11.7–15.5)
LYMPHOCYTES # BLD AUTO: 1284 CELLS/UL (ref 850–3900)
LYMPHOCYTES NFR BLD AUTO: 24.7 %
MCH RBC QN AUTO: 31 PG (ref 27–33)
MCHC RBC AUTO-ENTMCNC: 33.4 G/DL (ref 32–36)
MCV RBC AUTO: 92.7 FL (ref 80–100)
MONOCYTES # BLD AUTO: 562 CELLS/UL (ref 200–950)
MONOCYTES NFR BLD AUTO: 10.8 %
NEUTROPHILS # BLD AUTO: 3110 CELLS/UL (ref 1500–7800)
NEUTROPHILS NFR BLD AUTO: 59.8 %
PLATELET # BLD AUTO: 360 THOUSAND/UL (ref 140–400)
PMV BLD REES-ECKER: 9.7 FL (ref 7.5–12.5)
POTASSIUM SERPL-SCNC: 4.4 MMOL/L (ref 3.5–5.3)
PROT SERPL-MCNC: 7.1 G/DL (ref 6.1–8.1)
RBC # BLD AUTO: 3.81 MILLION/UL (ref 3.8–5.1)
SODIUM SERPL-SCNC: 140 MMOL/L (ref 135–146)
WBC # BLD AUTO: 5.2 THOUSAND/UL (ref 3.8–10.8)

## 2023-02-14 ENCOUNTER — OFFICE VISIT (OUTPATIENT)
Dept: RHEUMATOLOGY | Facility: CLINIC | Age: 63
End: 2023-02-14
Payer: MEDICARE

## 2023-02-14 VITALS
SYSTOLIC BLOOD PRESSURE: 131 MMHG | WEIGHT: 210.13 LBS | HEIGHT: 67 IN | HEART RATE: 71 BPM | BODY MASS INDEX: 32.98 KG/M2 | DIASTOLIC BLOOD PRESSURE: 89 MMHG

## 2023-02-14 DIAGNOSIS — Z96.653 HISTORY OF BILATERAL KNEE REPLACEMENT: Chronic | ICD-10-CM

## 2023-02-14 DIAGNOSIS — M05.79 RHEUMATOID ARTHRITIS INVOLVING MULTIPLE SITES WITH POSITIVE RHEUMATOID FACTOR: Chronic | ICD-10-CM

## 2023-02-14 DIAGNOSIS — Z79.899 HIGH RISK MEDICATION USE: Chronic | ICD-10-CM

## 2023-02-14 PROCEDURE — 1159F PR MEDICATION LIST DOCUMENTED IN MEDICAL RECORD: ICD-10-PCS | Mod: CPTII,S$GLB,, | Performed by: INTERNAL MEDICINE

## 2023-02-14 PROCEDURE — 1160F PR REVIEW ALL MEDS BY PRESCRIBER/CLIN PHARMACIST DOCUMENTED: ICD-10-PCS | Mod: CPTII,S$GLB,, | Performed by: INTERNAL MEDICINE

## 2023-02-14 PROCEDURE — 99214 PR OFFICE/OUTPT VISIT, EST, LEVL IV, 30-39 MIN: ICD-10-PCS | Mod: S$GLB,,, | Performed by: INTERNAL MEDICINE

## 2023-02-14 PROCEDURE — 3008F PR BODY MASS INDEX (BMI) DOCUMENTED: ICD-10-PCS | Mod: CPTII,S$GLB,, | Performed by: INTERNAL MEDICINE

## 2023-02-14 PROCEDURE — 99999 PR PBB SHADOW E&M-EST. PATIENT-LVL IV: ICD-10-PCS | Mod: PBBFAC,,, | Performed by: INTERNAL MEDICINE

## 2023-02-14 PROCEDURE — 99214 OFFICE O/P EST MOD 30 MIN: CPT | Mod: S$GLB,,, | Performed by: INTERNAL MEDICINE

## 2023-02-14 PROCEDURE — 3008F BODY MASS INDEX DOCD: CPT | Mod: CPTII,S$GLB,, | Performed by: INTERNAL MEDICINE

## 2023-02-14 PROCEDURE — 1160F RVW MEDS BY RX/DR IN RCRD: CPT | Mod: CPTII,S$GLB,, | Performed by: INTERNAL MEDICINE

## 2023-02-14 PROCEDURE — 99999 PR PBB SHADOW E&M-EST. PATIENT-LVL IV: CPT | Mod: PBBFAC,,, | Performed by: INTERNAL MEDICINE

## 2023-02-14 PROCEDURE — 3075F PR MOST RECENT SYSTOLIC BLOOD PRESS GE 130-139MM HG: ICD-10-PCS | Mod: CPTII,S$GLB,, | Performed by: INTERNAL MEDICINE

## 2023-02-14 PROCEDURE — 3079F DIAST BP 80-89 MM HG: CPT | Mod: CPTII,S$GLB,, | Performed by: INTERNAL MEDICINE

## 2023-02-14 PROCEDURE — 3079F PR MOST RECENT DIASTOLIC BLOOD PRESSURE 80-89 MM HG: ICD-10-PCS | Mod: CPTII,S$GLB,, | Performed by: INTERNAL MEDICINE

## 2023-02-14 PROCEDURE — 1159F MED LIST DOCD IN RCRD: CPT | Mod: CPTII,S$GLB,, | Performed by: INTERNAL MEDICINE

## 2023-02-14 PROCEDURE — 3075F SYST BP GE 130 - 139MM HG: CPT | Mod: CPTII,S$GLB,, | Performed by: INTERNAL MEDICINE

## 2023-02-14 RX ORDER — GABAPENTIN 100 MG/1
100 CAPSULE ORAL 2 TIMES DAILY
COMMUNITY
End: 2023-02-14

## 2023-02-14 RX ORDER — ASPIRIN 81 MG/1
81 TABLET ORAL 2 TIMES DAILY
COMMUNITY
Start: 2023-01-19

## 2023-02-14 RX ORDER — OXYCODONE HYDROCHLORIDE 5 MG/1
5 CAPSULE ORAL EVERY 4 HOURS PRN
COMMUNITY
End: 2023-02-14

## 2023-02-14 RX ORDER — OXYCODONE AND ACETAMINOPHEN 5; 325 MG/1; MG/1
1 TABLET ORAL EVERY 6 HOURS PRN
COMMUNITY
Start: 2023-02-01 | End: 2023-02-14

## 2023-02-14 RX ORDER — GABAPENTIN 300 MG/1
300 CAPSULE ORAL 2 TIMES DAILY
COMMUNITY
Start: 2023-01-19

## 2023-02-14 RX ORDER — MUPIROCIN 20 MG/G
OINTMENT TOPICAL NIGHTLY
COMMUNITY
Start: 2022-10-05

## 2023-02-14 NOTE — PROGRESS NOTES
"Subjective:       Patient ID: Sherrell Yip is a 63 y.o. female.    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[place]      CCP+ RA with positive ANTONIA    MTX since Oct 2015     mg/d since April 2016 -Nov 2016 when switched to LFA  LFA added Nov 2016 then stopped 2017 due to elevated AST/ALT  Prednisone bridge 1 month, July 2015  NSAID: hx Celebrex; hx ibuprofen caused stomach ache  HCQ since  June 2017     Hx breast cancer 2010      Currently:   bid  MTX 15 mg / week and 1mg folic acid everyday (states she was having difficulty getting the folic acid)  Celebrex    Celebrex bid since surgery  Gabapentin bid    S/p L TKP Aug 2022  Had R TKP last month; in PT and functioning well    Held meds 2 weeks before and 2 weeks after; resumed both last week    Labs 11/28 and 2/9/23 both were normal  Review of Systems      Objective:   /89   Pulse 71   Ht 5' 7" (1.702 m)   Wt 95.3 kg (210 lb 1.6 oz)   BMI 32.91 kg/m²      Physical Exam      12/1/2021 4/5/2022 9/12/2022 2/14/2023   Tender (VALDERRAMA-28) 2 / 28 2 / 28 1 / 28  0 / 28    Swollen (VALDERRAMA-28) 0 / 28 0 / 28 1 / 28  0 / 28    Provider Global 25 mm 20 mm 0 mm 0 mm   Patient Global 55 mm -- 50 mm 50 mm   ESR 14 mm/hr 17 mm/hr 33 mm/hr 19 mm/hr   CRP 2.6 mg/L 3 mg/L 6.7 mg/L --   VALDERRAMA-28 (ESR) 3.41 (Moderate disease activity) -- 3.99 (Moderate disease activity) 2.76 (Low disease activity)   VALDERRAMA-28 (CRP) 2.98 (Low disease activity) -- 3.23 (Moderate disease activity) --   CDAI Score 10  -- 7  5      Lab Results   Component Value Date    SEDRATE 19 02/09/2023     Lab Results   Component Value Date    CRP 6.7 08/17/2022         Assessment:       1. Rheumatoid arthritis involving multiple sites with positive rheumatoid factor    2. High risk medication use    3. History of bilateral knee replacement              Plan:       Problem List Items Addressed This Visit          Active Problems    Rheumatoid arthritis involving " multiple sites with positive rheumatoid factor (Chronic)     RA doing well and now s/p bilateral TKP so anticipates better mobility    Inflammation appears to be limited to healing R knee    She c/o upper abdominal pain that may be related to bid celecoxib; she is on PPI and will decrease celecoxib as knee heals    For RA, disease activity is low so will continue methotrexate plus hydroxychloroquine  current dose          High risk medication use (Chronic)     Tolerating methotrexate and hydroxychloroquine well with no clinical or lab adverse effects     She will plan annual eye check for hydroxychloroquine and we will continue q3m lab for mtx         History of bilateral knee replacement (Chronic)     Describes significant bruising and swelling of proximal thigh after TKP surgery that was blamed on the tourniquet; no DVT and she recovered

## 2023-02-14 NOTE — ASSESSMENT & PLAN NOTE
Tolerating methotrexate and hydroxychloroquine well with no clinical or lab adverse effects     She will plan annual eye check for hydroxychloroquine and we will continue q3m lab for mtx

## 2023-02-14 NOTE — ASSESSMENT & PLAN NOTE
Describes significant bruising and swelling of proximal thigh after TKP surgery that was blamed on the tourniquet; no DVT and she recovered

## 2023-02-14 NOTE — ASSESSMENT & PLAN NOTE
RA doing well and now s/p bilateral TKP so anticipates better mobility    Inflammation appears to be limited to healing R knee    She c/o upper abdominal pain that may be related to bid celecoxib; she is on PPI and will decrease celecoxib as knee heals    For RA, disease activity is low so will continue methotrexate plus hydroxychloroquine  current dose

## 2023-03-14 DIAGNOSIS — M05.79 RHEUMATOID ARTHRITIS INVOLVING MULTIPLE SITES WITH POSITIVE RHEUMATOID FACTOR: Chronic | ICD-10-CM

## 2023-03-15 RX ORDER — METHOTREXATE 2.5 MG/1
15 TABLET ORAL
Qty: 78 TABLET | Refills: 0 | Status: SHIPPED | OUTPATIENT
Start: 2023-03-15 | End: 2023-07-14 | Stop reason: SDUPTHER

## 2023-04-11 DIAGNOSIS — M05.79 RHEUMATOID ARTHRITIS INVOLVING MULTIPLE SITES WITH POSITIVE RHEUMATOID FACTOR: Chronic | ICD-10-CM

## 2023-04-11 RX ORDER — METHOTREXATE 2.5 MG/1
15 TABLET ORAL
Qty: 78 TABLET | Refills: 0 | OUTPATIENT
Start: 2023-04-11

## 2023-04-17 ENCOUNTER — TELEPHONE (OUTPATIENT)
Dept: RHEUMATOLOGY | Facility: CLINIC | Age: 63
End: 2023-04-17
Payer: MEDICARE

## 2023-04-17 DIAGNOSIS — M05.79 RHEUMATOID ARTHRITIS INVOLVING MULTIPLE SITES WITH POSITIVE RHEUMATOID FACTOR: Primary | Chronic | ICD-10-CM

## 2023-04-18 NOTE — TELEPHONE ENCOUNTER
----- Message from Maru Johnson RN sent at 4/17/2023  5:58 PM CDT -----  Please put lab orders in epic for [facility]

## 2023-05-11 DIAGNOSIS — B37.31 VAGINAL YEAST INFECTION: ICD-10-CM

## 2023-05-12 RX ORDER — FLUCONAZOLE 150 MG/1
TABLET ORAL
Qty: 6 TABLET | Refills: 0 | Status: SHIPPED | OUTPATIENT
Start: 2023-05-12 | End: 2023-06-14 | Stop reason: SDUPTHER

## 2023-05-19 LAB
ALBUMIN SERPL-MCNC: 4.4 G/DL (ref 3.6–5.1)
ALBUMIN/GLOB SERPL: 1.8 (CALC) (ref 1–2.5)
ALP SERPL-CCNC: 95 U/L (ref 37–153)
ALT SERPL-CCNC: 14 U/L (ref 6–29)
AST SERPL-CCNC: 15 U/L (ref 10–35)
BASOPHILS # BLD AUTO: 42 CELLS/UL (ref 0–200)
BASOPHILS NFR BLD AUTO: 1 %
BILIRUB SERPL-MCNC: 0.3 MG/DL (ref 0.2–1.2)
BUN SERPL-MCNC: 12 MG/DL (ref 7–25)
BUN/CREAT SERPL: NORMAL (CALC) (ref 6–22)
CALCIUM SERPL-MCNC: 9.2 MG/DL (ref 8.6–10.4)
CHLORIDE SERPL-SCNC: 103 MMOL/L (ref 98–110)
CO2 SERPL-SCNC: 29 MMOL/L (ref 20–32)
CREAT SERPL-MCNC: 0.8 MG/DL (ref 0.5–1.05)
CRP SERPL-MCNC: 5 MG/L
EGFR: 83 ML/MIN/1.73M2
EOSINOPHIL # BLD AUTO: 109 CELLS/UL (ref 15–500)
EOSINOPHIL NFR BLD AUTO: 2.6 %
ERYTHROCYTE [DISTWIDTH] IN BLOOD BY AUTOMATED COUNT: 14.3 % (ref 11–15)
ERYTHROCYTE [SEDIMENTATION RATE] IN BLOOD BY WESTERGREN METHOD: 2 MM/H
GLOBULIN SER CALC-MCNC: 2.4 G/DL (CALC) (ref 1.9–3.7)
GLUCOSE SERPL-MCNC: 94 MG/DL (ref 65–99)
HCT VFR BLD AUTO: 37.5 % (ref 35–45)
HGB BLD-MCNC: 12.4 G/DL (ref 11.7–15.5)
LYMPHOCYTES # BLD AUTO: 1273 CELLS/UL (ref 850–3900)
LYMPHOCYTES NFR BLD AUTO: 30.3 %
MCH RBC QN AUTO: 30.5 PG (ref 27–33)
MCHC RBC AUTO-ENTMCNC: 33.1 G/DL (ref 32–36)
MCV RBC AUTO: 92.1 FL (ref 80–100)
MONOCYTES # BLD AUTO: 462 CELLS/UL (ref 200–950)
MONOCYTES NFR BLD AUTO: 11 %
NEUTROPHILS # BLD AUTO: 2314 CELLS/UL (ref 1500–7800)
NEUTROPHILS NFR BLD AUTO: 55.1 %
PLATELET # BLD AUTO: 285 THOUSAND/UL (ref 140–400)
PMV BLD REES-ECKER: 9.2 FL (ref 7.5–12.5)
POTASSIUM SERPL-SCNC: 4.5 MMOL/L (ref 3.5–5.3)
PROT SERPL-MCNC: 6.8 G/DL (ref 6.1–8.1)
RBC # BLD AUTO: 4.07 MILLION/UL (ref 3.8–5.1)
SODIUM SERPL-SCNC: 139 MMOL/L (ref 135–146)
WBC # BLD AUTO: 4.2 THOUSAND/UL (ref 3.8–10.8)

## 2023-06-14 DIAGNOSIS — B37.31 VAGINAL YEAST INFECTION: ICD-10-CM

## 2023-06-14 RX ORDER — FLUCONAZOLE 150 MG/1
150 TABLET ORAL
Qty: 6 TABLET | Refills: 2 | Status: SHIPPED | OUTPATIENT
Start: 2023-06-14 | End: 2023-12-04

## 2023-07-14 DIAGNOSIS — M05.79 RHEUMATOID ARTHRITIS INVOLVING MULTIPLE SITES WITH POSITIVE RHEUMATOID FACTOR: Chronic | ICD-10-CM

## 2023-07-14 RX ORDER — FOLIC ACID 1 MG/1
1000 TABLET ORAL DAILY
Qty: 90 TABLET | Refills: 3 | Status: SHIPPED | OUTPATIENT
Start: 2023-07-14

## 2023-07-14 RX ORDER — METHOTREXATE 2.5 MG/1
15 TABLET ORAL
Qty: 78 TABLET | Refills: 0 | Status: SHIPPED | OUTPATIENT
Start: 2023-07-14 | End: 2023-09-12 | Stop reason: SDUPTHER

## 2023-07-31 ENCOUNTER — TELEPHONE (OUTPATIENT)
Dept: SURGERY | Facility: CLINIC | Age: 63
End: 2023-07-31
Payer: MEDICARE

## 2023-07-31 DIAGNOSIS — Z85.3 HISTORY OF BREAST CANCER: Primary | ICD-10-CM

## 2023-07-31 NOTE — TELEPHONE ENCOUNTER
Spoke with  regarding msg. Mammo order placed, appt scheduled for 8/30 at 11. Patient confirmed date and time.       ----- Message from Nessa Alcala sent at 7/31/2023  1:34 PM CDT -----  .Type: Patient Call Back    Who called: self     What is the request in detail: patient is requesting a callback in regards to setting up a diagnostic mammogram ,patient states she does not want a regular mammogram because it did not show that she had breast cancer     Can the clinic reply by [facility]? call    Would the patient rather a call back or a response via [facility]? call    Best call back number: [phone]    Additional Information: [phone]

## 2023-08-28 ENCOUNTER — TELEPHONE (OUTPATIENT)
Dept: RHEUMATOLOGY | Facility: CLINIC | Age: 63
End: 2023-08-28
Payer: MEDICARE

## 2023-08-28 NOTE — TELEPHONE ENCOUNTER
Called to inform patient of the need to r/s 8/28/23 appointment, because the physician is out ill.  Patient verbalizes understanding.  Appointment r/s to 9/12/23 @ 10:00.

## 2023-08-30 ENCOUNTER — HOSPITAL ENCOUNTER (OUTPATIENT)
Dept: RADIOLOGY | Facility: HOSPITAL | Age: 63
Discharge: HOME | End: 2023-08-30
Attending: SURGERY
Payer: MEDICARE

## 2023-08-30 DIAGNOSIS — Z85.3 HISTORY OF BREAST CANCER: ICD-10-CM

## 2023-08-30 PROCEDURE — 77065 DX MAMMO INCL CAD UNI: CPT | Mod: 26,LT,, | Performed by: RADIOLOGY

## 2023-08-30 PROCEDURE — 77061 BREAST TOMOSYNTHESIS UNI: CPT | Mod: 26,LT,, | Performed by: RADIOLOGY

## 2023-08-30 PROCEDURE — 77065 MAMMO DIGITAL DIAGNOSTIC LEFT WITH TOMO: ICD-10-PCS | Mod: 26,LT,, | Performed by: RADIOLOGY

## 2023-08-30 PROCEDURE — 77061 BREAST TOMOSYNTHESIS UNI: CPT | Mod: TC,LT

## 2023-08-30 PROCEDURE — 77061 MAMMO DIGITAL DIAGNOSTIC LEFT WITH TOMO: ICD-10-PCS | Mod: 26,LT,, | Performed by: RADIOLOGY

## 2023-09-12 ENCOUNTER — OFFICE VISIT (OUTPATIENT)
Dept: RHEUMATOLOGY | Facility: CLINIC | Age: 63
End: 2023-09-12
Payer: MEDICARE

## 2023-09-12 VITALS
HEIGHT: 67 IN | WEIGHT: 218.94 LBS | SYSTOLIC BLOOD PRESSURE: 134 MMHG | BODY MASS INDEX: 34.36 KG/M2 | HEART RATE: 66 BPM | DIASTOLIC BLOOD PRESSURE: 86 MMHG

## 2023-09-12 DIAGNOSIS — Z79.899 HIGH RISK MEDICATION USE: Chronic | ICD-10-CM

## 2023-09-12 DIAGNOSIS — Z96.653 HISTORY OF BILATERAL KNEE REPLACEMENT: Chronic | ICD-10-CM

## 2023-09-12 DIAGNOSIS — M05.79 RHEUMATOID ARTHRITIS INVOLVING MULTIPLE SITES WITH POSITIVE RHEUMATOID FACTOR: Primary | Chronic | ICD-10-CM

## 2023-09-12 PROCEDURE — 3075F SYST BP GE 130 - 139MM HG: CPT | Mod: CPTII,S$GLB,, | Performed by: INTERNAL MEDICINE

## 2023-09-12 PROCEDURE — 3075F PR MOST RECENT SYSTOLIC BLOOD PRESS GE 130-139MM HG: ICD-10-PCS | Mod: CPTII,S$GLB,, | Performed by: INTERNAL MEDICINE

## 2023-09-12 PROCEDURE — 3079F PR MOST RECENT DIASTOLIC BLOOD PRESSURE 80-89 MM HG: ICD-10-PCS | Mod: CPTII,S$GLB,, | Performed by: INTERNAL MEDICINE

## 2023-09-12 PROCEDURE — 3079F DIAST BP 80-89 MM HG: CPT | Mod: CPTII,S$GLB,, | Performed by: INTERNAL MEDICINE

## 2023-09-12 PROCEDURE — 1159F MED LIST DOCD IN RCRD: CPT | Mod: CPTII,S$GLB,, | Performed by: INTERNAL MEDICINE

## 2023-09-12 PROCEDURE — 3008F PR BODY MASS INDEX (BMI) DOCUMENTED: ICD-10-PCS | Mod: CPTII,S$GLB,, | Performed by: INTERNAL MEDICINE

## 2023-09-12 PROCEDURE — 4010F ACE/ARB THERAPY RXD/TAKEN: CPT | Mod: CPTII,S$GLB,, | Performed by: INTERNAL MEDICINE

## 2023-09-12 PROCEDURE — 99999 PR PBB SHADOW E&M-EST. PATIENT-LVL IV: ICD-10-PCS | Mod: PBBFAC,,, | Performed by: INTERNAL MEDICINE

## 2023-09-12 PROCEDURE — 99214 OFFICE O/P EST MOD 30 MIN: CPT | Mod: S$GLB,,, | Performed by: INTERNAL MEDICINE

## 2023-09-12 PROCEDURE — 3008F BODY MASS INDEX DOCD: CPT | Mod: CPTII,S$GLB,, | Performed by: INTERNAL MEDICINE

## 2023-09-12 PROCEDURE — 99214 PR OFFICE/OUTPT VISIT, EST, LEVL IV, 30-39 MIN: ICD-10-PCS | Mod: S$GLB,,, | Performed by: INTERNAL MEDICINE

## 2023-09-12 PROCEDURE — 1159F PR MEDICATION LIST DOCUMENTED IN MEDICAL RECORD: ICD-10-PCS | Mod: CPTII,S$GLB,, | Performed by: INTERNAL MEDICINE

## 2023-09-12 PROCEDURE — 99999 PR PBB SHADOW E&M-EST. PATIENT-LVL IV: CPT | Mod: PBBFAC,,, | Performed by: INTERNAL MEDICINE

## 2023-09-12 PROCEDURE — 4010F PR ACE/ARB THEARPY RXD/TAKEN: ICD-10-PCS | Mod: CPTII,S$GLB,, | Performed by: INTERNAL MEDICINE

## 2023-09-12 PROCEDURE — 1160F RVW MEDS BY RX/DR IN RCRD: CPT | Mod: CPTII,S$GLB,, | Performed by: INTERNAL MEDICINE

## 2023-09-12 PROCEDURE — 1160F PR REVIEW ALL MEDS BY PRESCRIBER/CLIN PHARMACIST DOCUMENTED: ICD-10-PCS | Mod: CPTII,S$GLB,, | Performed by: INTERNAL MEDICINE

## 2023-09-12 RX ORDER — METHOTREXATE 2.5 MG/1
15 TABLET ORAL
Qty: 78 TABLET | Refills: 0 | Status: SHIPPED | OUTPATIENT
Start: 2023-09-12 | End: 2023-11-29

## 2023-09-12 ASSESSMENT — ROUTINE ASSESSMENT OF PATIENT INDEX DATA (RAPID3)
TOTAL RAPID3 SCORE: 2.33
FATIGUE SCORE: 7.5
PAIN SCORE: 4.5
AM STIFFNESS SCORE: 1, YES
PSYCHOLOGICAL DISTRESS SCORE: 5.5
PATIENT GLOBAL ASSESSMENT SCORE: 2.5
MDHAQ FUNCTION SCORE: 0

## 2023-09-12 NOTE — PROGRESS NOTES
"Subjective:       Patient ID: Sherrell Yip is a 63 y.o. female.    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[place]      CCP+ RA with positive ANTONIA    MTX since Oct 2015     mg/d since April 2016 -Nov 2016 when switched to LFA  LFA added Nov 2016 then stopped 2017 due to elevated AST/ALT  Prednisone bridge 1 month, July 2015  NSAID: hx Celebrex; hx ibuprofen caused stomach ache  HCQ since  June 2017     Hx breast cancer 2010      Currently:   bid  MTX 15 mg / week and 1mg folic acid everyday (states she was having difficulty getting the folic acid)  Celebrex    Has had some flare ups  Been cutting back on Celebrex  Hands and arms will feel stiff and achy  Tylenol Arthritis    Gaining weight ; not walking as much; eats some carbs    7/7 labs ok; ESR 8/21 normal 6    Review of Systems   Constitutional:  Positive for fatigue and unexpected weight change. Negative for fever.   HENT:  Negative for mouth sores and trouble swallowing.         Dry mouth   Eyes:  Negative for redness.        Dry eyes   Respiratory:  Negative for cough and shortness of breath.    Cardiovascular:  Negative for chest pain.   Gastrointestinal:  Negative for abdominal pain, constipation and diarrhea.   Genitourinary:  Negative for dysuria and genital sores.   Skin:  Negative for color change and rash.   Neurological:  Negative for headaches.   Hematological:  Negative for adenopathy. Does not bruise/bleed easily.   Psychiatric/Behavioral:  Positive for sleep disturbance.          Objective:   /86   Pulse 66   Ht 5' 7" (1.702 m)   Wt 99.3 kg (218 lb 14.7 oz)   BMI 34.29 kg/m²      Physical Exam   Eyes: Conjunctivae are normal.   Cardiovascular: Normal rate and regular rhythm.   Pulmonary/Chest: She has no wheezes. She has no rales.   Lymphadenopathy:     She has no cervical adenopathy.   Skin: No rash noted.         12/1/2021 4/5/2022 9/12/2022 2/14/2023   Tender (VALDERRAMA-28) 2 / 28 2 / 28  " 1 / 28 0 / 28    Swollen (VALDERRAMA-28) 0 / 28 0 / 28 1 / 28 0 / 28    Provider Global 25 mm 20 mm 0 mm 0 mm   Patient Global 55 mm -- 50 mm 50 mm   ESR 14 mm/hr 17 mm/hr 33 mm/hr 19 mm/hr   CRP 2.6 mg/L 3 mg/L 6.7 mg/L --   VALDERRAMA-28 (ESR) 3.41 (Moderate disease activity) -- 3.99 (Moderate disease activity) 2.76 (Low disease activity)   VALDERRAMA-28 (CRP) 2.98 (Low disease activity) -- 3.23 (Moderate disease activity) --   CDAI Score 10  -- 7  5      Lab Results   Component Value Date    SEDRATE 6 08/21/2023          Assessment:       1. Rheumatoid arthritis involving multiple sites with positive rheumatoid factor    2. High risk medication use    3. History of bilateral knee replacement            Plan:       Problem List Items Addressed This Visit          Active Problems    Rheumatoid arthritis involving multiple sites with positive rheumatoid factor - Primary (Chronic)     rheumatoid arthritis controlled on methotrexate plus hydroxychloroquine with no objective synovitis and intermittent arthralgia shoulders    Overall better since recovered from TKP    Needs to lose weight; discussed reducing carbs    Needs follow up lab Oct and Jan and RTC me Feb with her sister         Relevant Medications    methotrexate 2.5 MG Tab    Other Relevant Orders    Sedimentation rate, automated    High risk medication use (Chronic)     No treatment related adverse effects; will continue to monitor for drug toxicity           Relevant Orders    CBC Without Differential    Comprehensive Metabolic Panel    History of bilateral knee replacement (Chronic)     Good results with better ambulation

## 2023-09-12 NOTE — ASSESSMENT & PLAN NOTE
rheumatoid arthritis controlled on methotrexate plus hydroxychloroquine with no objective synovitis and intermittent arthralgia shoulders    Overall better since recovered from TKP    Needs to lose weight; discussed reducing carbs    Needs follow up lab Oct and Jan and RTC me Feb with her sister

## 2023-11-15 DIAGNOSIS — M05.79 RHEUMATOID ARTHRITIS INVOLVING MULTIPLE SITES WITH POSITIVE RHEUMATOID FACTOR: Chronic | ICD-10-CM

## 2023-11-15 RX ORDER — HYDROXYCHLOROQUINE SULFATE 200 MG/1
200 TABLET, FILM COATED ORAL 2 TIMES DAILY
Qty: 180 TABLET | Refills: 3 | Status: SHIPPED | OUTPATIENT
Start: 2023-11-15 | End: 2023-12-04 | Stop reason: SDUPTHER

## 2023-12-03 DIAGNOSIS — M05.79 RHEUMATOID ARTHRITIS INVOLVING MULTIPLE SITES WITH POSITIVE RHEUMATOID FACTOR: Chronic | ICD-10-CM

## 2023-12-03 DIAGNOSIS — B37.31 VAGINAL YEAST INFECTION: ICD-10-CM

## 2023-12-04 RX ORDER — METHOTREXATE 2.5 MG/1
15 TABLET ORAL
Qty: 78 TABLET | Refills: 0 | Status: SHIPPED | OUTPATIENT
Start: 2023-12-04 | End: 2023-12-11 | Stop reason: SDUPTHER

## 2023-12-04 RX ORDER — FLUCONAZOLE 150 MG/1
150 TABLET ORAL
Qty: 3 TABLET | Refills: 0 | Status: SHIPPED | OUTPATIENT
Start: 2023-12-04 | End: 2024-02-14 | Stop reason: SDUPTHER

## 2023-12-04 RX ORDER — HYDROXYCHLOROQUINE SULFATE 200 MG/1
200 TABLET, FILM COATED ORAL 2 TIMES DAILY
Qty: 180 TABLET | Refills: 3 | Status: SHIPPED | OUTPATIENT
Start: 2023-12-04

## 2023-12-11 DIAGNOSIS — M05.79 RHEUMATOID ARTHRITIS INVOLVING MULTIPLE SITES WITH POSITIVE RHEUMATOID FACTOR: Chronic | ICD-10-CM

## 2023-12-11 RX ORDER — METHOTREXATE 2.5 MG/1
15 TABLET ORAL
Qty: 78 TABLET | Refills: 0 | Status: SHIPPED | OUTPATIENT
Start: 2023-12-11

## 2024-02-14 ENCOUNTER — OFFICE VISIT (OUTPATIENT)
Dept: RHEUMATOLOGY | Facility: CLINIC | Age: 64
End: 2024-02-14
Payer: MEDICARE

## 2024-02-14 VITALS
BODY MASS INDEX: 34.95 KG/M2 | HEIGHT: 67 IN | SYSTOLIC BLOOD PRESSURE: 157 MMHG | WEIGHT: 222.69 LBS | DIASTOLIC BLOOD PRESSURE: 88 MMHG | HEART RATE: 77 BPM

## 2024-02-14 DIAGNOSIS — B37.31 VAGINAL YEAST INFECTION: ICD-10-CM

## 2024-02-14 DIAGNOSIS — R30.0 DYSURIA: Primary | ICD-10-CM

## 2024-02-14 DIAGNOSIS — D84.821 IMMUNOSUPPRESSION DUE TO DRUG THERAPY: ICD-10-CM

## 2024-02-14 DIAGNOSIS — Z79.899 HIGH RISK MEDICATION USE: Chronic | ICD-10-CM

## 2024-02-14 DIAGNOSIS — Z79.899 IMMUNOSUPPRESSION DUE TO DRUG THERAPY: ICD-10-CM

## 2024-02-14 DIAGNOSIS — M05.79 RHEUMATOID ARTHRITIS INVOLVING MULTIPLE SITES WITH POSITIVE RHEUMATOID FACTOR: Chronic | ICD-10-CM

## 2024-02-14 PROCEDURE — 99214 OFFICE O/P EST MOD 30 MIN: CPT | Mod: S$GLB,,, | Performed by: INTERNAL MEDICINE

## 2024-02-14 PROCEDURE — 99999 PR PBB SHADOW E&M-EST. PATIENT-LVL III: CPT | Mod: PBBFAC,,, | Performed by: INTERNAL MEDICINE

## 2024-02-14 RX ORDER — NITROFURANTOIN 25; 75 MG/1; MG/1
100 CAPSULE ORAL 2 TIMES DAILY
Qty: 20 CAPSULE | Refills: 0 | Status: SHIPPED | OUTPATIENT
Start: 2024-02-14

## 2024-02-14 RX ORDER — FLUCONAZOLE 150 MG/1
150 TABLET ORAL
Qty: 3 TABLET | Refills: 0 | Status: SHIPPED | OUTPATIENT
Start: 2024-02-14 | End: 2024-03-15

## 2024-02-14 ASSESSMENT — ROUTINE ASSESSMENT OF PATIENT INDEX DATA (RAPID3)
PATIENT GLOBAL ASSESSMENT SCORE: 1
TOTAL RAPID3 SCORE: 1.33
MDHAQ FUNCTION SCORE: 0
PAIN SCORE: 3
AM STIFFNESS SCORE: 0, NO
FATIGUE SCORE: 3.5
PSYCHOLOGICAL DISTRESS SCORE: 0

## 2024-02-14 NOTE — ASSESSMENT & PLAN NOTE
No active rheumatoid arthritis on current methotrexate plus hydroxychloroquine Rx; just returned from [place] and did well     Will continue current Rx and RTC me with her sister in May after labs

## 2024-02-14 NOTE — PROGRESS NOTES
"Subjective:       Patient ID: Sherrell Yip is a 64 y.o. female.    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 Negative for fever and unexpected weight change.   HENT:  Negative for mouth sores and trouble swallowing.         Dry mouth   Eyes:  Negative for redness.        Dry eyes   Respiratory:  Positive for cough. Negative for shortness of breath.    Cardiovascular:  Negative for chest pain.   Gastrointestinal:  Positive for diarrhea. Negative for abdominal pain and constipation.   Genitourinary:  Positive for genital sores. Negative for dysuria.   Skin:  Negative for color change and rash.   Neurological:  Negative for headaches.   Hematological:  Negative for adenopathy. Does not bruise/bleed easily.   Psychiatric/Behavioral:  Positive for sleep disturbance.          Objective:   BP (!) 157/88   Pulse 77   Ht 5' 7" (1.702 m)   Wt 101 kg (222 lb 10.6 oz)   BMI 34.87 kg/m²      Physical Exam      12/1/2021 4/5/2022 9/12/2022 2/14/2023   Tender (VALDERRAMA-28) 2 / 28 2 / 28 1 / 28  0 / 28    Swollen (VALDERRAMA-28) 0 / 28  0 / 28 1 / 28  0 / 28    Provider Global 25 mm 20 mm 0 mm 0 mm   Patient Global 55 mm -- 50 mm 50 mm   ESR 14 mm/hr 17 mm/hr 33 mm/hr 19 mm/hr   CRP 2.6 mg/L 3 mg/L 6.7 mg/L --   VALDERRAMA-28 (ESR) 3.41 (Moderate disease activity) -- 3.99 (Moderate disease activity) 2.76 (Low disease activity)   VALDERRAMA-28 (CRP) 2.98 (Low disease activity) -- 3.23 (Moderate disease " activity) --   CDAI Score 10  -- 7  5      Lab Results   Component Value Date    SEDRATE 19 01/30/2024          Assessment:       1. Dysuria    2. Vaginal yeast infection    3. Rheumatoid arthritis involving multiple sites with positive rheumatoid factor    4. High risk medication use    5. Immunosuppression due to drug therapy            Plan:       Problem List Items Addressed This Visit          Active Problems    Rheumatoid arthritis involving multiple sites with positive rheumatoid factor (Chronic)     No active rheumatoid arthritis on current methotrexate plus hydroxychloroquine Rx; just returned from [place] and did well     Will continue current Rx and RTC me with her sister in May after labs         High risk medication use (Chronic)     No treatment related adverse effects; will continue to monitor for drug toxicity           Immunosuppression due to drug therapy     No interval infections but no c/o dysuria and she suspects UTI  Will Rx macrodantin empirically    Up to date on flu shot         Vaginal yeast infection    Relevant Medications    fluconazole (DIFLUCAN) 150 MG Tab     Other Visit Diagnoses       Dysuria    -  Primary    Relevant Medications    nitrofurantoin, macrocrystal-monohydrate, (MACROBID) 100 MG capsule

## 2024-02-14 NOTE — ASSESSMENT & PLAN NOTE
No interval infections but no c/o dysuria and she suspects UTI  Will Rx macrodantin empirically    Up to date on flu shot

## 2024-02-26 NOTE — ADDENDUM NOTE
Addended by: MAGDA SPEAR on: 8/21/2019 08:45 AM     Modules accepted: [place]     Instructions: This plan will send the code FBSE to the PM system.  DO NOT or CHANGE the price. Price (Do Not Change): 0.00 Detail Level: Simple

## 2024-03-15 DIAGNOSIS — B37.31 VAGINAL YEAST INFECTION: ICD-10-CM

## 2024-03-15 RX ORDER — FLUCONAZOLE 150 MG/1
150 TABLET ORAL
Qty: 3 TABLET | Refills: 0 | Status: SHIPPED | OUTPATIENT
Start: 2024-03-15 | End: 2024-04-22

## 2024-04-19 DIAGNOSIS — B37.31 VAGINAL YEAST INFECTION: ICD-10-CM

## 2024-04-22 RX ORDER — FLUCONAZOLE 150 MG/1
150 TABLET ORAL
Qty: 3 TABLET | Refills: 0 | Status: SHIPPED | OUTPATIENT
Start: 2024-04-22 | End: 2024-05-24 | Stop reason: SDUPTHER

## 2024-05-08 DIAGNOSIS — M05.79 RHEUMATOID ARTHRITIS INVOLVING MULTIPLE SITES WITH POSITIVE RHEUMATOID FACTOR: Chronic | ICD-10-CM

## 2024-05-08 RX ORDER — METHOTREXATE 2.5 MG/1
TABLET ORAL
Qty: 78 TABLET | Refills: 0 | Status: SHIPPED | OUTPATIENT
Start: 2024-05-08

## 2024-05-24 ENCOUNTER — OFFICE VISIT (OUTPATIENT)
Dept: RHEUMATOLOGY | Facility: CLINIC | Age: 64
End: 2024-05-24
Payer: MEDICARE

## 2024-05-24 VITALS
SYSTOLIC BLOOD PRESSURE: 128 MMHG | WEIGHT: 222.25 LBS | HEIGHT: 67 IN | DIASTOLIC BLOOD PRESSURE: 77 MMHG | HEART RATE: 66 BPM | BODY MASS INDEX: 34.88 KG/M2

## 2024-05-24 DIAGNOSIS — Z79.899 IMMUNOSUPPRESSION DUE TO DRUG THERAPY: ICD-10-CM

## 2024-05-24 DIAGNOSIS — M05.79 RHEUMATOID ARTHRITIS INVOLVING MULTIPLE SITES WITH POSITIVE RHEUMATOID FACTOR: Primary | Chronic | ICD-10-CM

## 2024-05-24 DIAGNOSIS — Z96.653 HISTORY OF BILATERAL KNEE REPLACEMENT: Chronic | ICD-10-CM

## 2024-05-24 DIAGNOSIS — D84.821 IMMUNOSUPPRESSION DUE TO DRUG THERAPY: ICD-10-CM

## 2024-05-24 DIAGNOSIS — B37.31 VAGINAL YEAST INFECTION: ICD-10-CM

## 2024-05-24 DIAGNOSIS — Z79.899 HIGH RISK MEDICATION USE: Chronic | ICD-10-CM

## 2024-05-24 PROCEDURE — 3008F BODY MASS INDEX DOCD: CPT | Mod: CPTII,S$GLB,, | Performed by: INTERNAL MEDICINE

## 2024-05-24 PROCEDURE — 3078F DIAST BP <80 MM HG: CPT | Mod: CPTII,S$GLB,, | Performed by: INTERNAL MEDICINE

## 2024-05-24 PROCEDURE — 1160F RVW MEDS BY RX/DR IN RCRD: CPT | Mod: CPTII,S$GLB,, | Performed by: INTERNAL MEDICINE

## 2024-05-24 PROCEDURE — 4010F ACE/ARB THERAPY RXD/TAKEN: CPT | Mod: CPTII,S$GLB,, | Performed by: INTERNAL MEDICINE

## 2024-05-24 PROCEDURE — 99999 PR PBB SHADOW E&M-EST. PATIENT-LVL IV: CPT | Mod: PBBFAC,,, | Performed by: INTERNAL MEDICINE

## 2024-05-24 PROCEDURE — 3074F SYST BP LT 130 MM HG: CPT | Mod: CPTII,S$GLB,, | Performed by: INTERNAL MEDICINE

## 2024-05-24 PROCEDURE — 99214 OFFICE O/P EST MOD 30 MIN: CPT | Mod: S$GLB,,, | Performed by: INTERNAL MEDICINE

## 2024-05-24 PROCEDURE — 1159F MED LIST DOCD IN RCRD: CPT | Mod: CPTII,S$GLB,, | Performed by: INTERNAL MEDICINE

## 2024-05-24 RX ORDER — FLUCONAZOLE 150 MG/1
150 TABLET ORAL
Qty: 3 TABLET | Refills: 5 | Status: SHIPPED | OUTPATIENT
Start: 2024-05-24 | End: 2024-05-31

## 2024-05-24 ASSESSMENT — ROUTINE ASSESSMENT OF PATIENT INDEX DATA (RAPID3)
PSYCHOLOGICAL DISTRESS SCORE: 6.6
MDHAQ FUNCTION SCORE: 0
PAIN SCORE: 0
PATIENT GLOBAL ASSESSMENT SCORE: 5
TOTAL RAPID3 SCORE: 1.67
FATIGUE SCORE: 9
AM STIFFNESS SCORE: 0, NO

## 2024-05-24 NOTE — ASSESSMENT & PLAN NOTE
Some subjective symptoms but rheumatoid arthritis appears well controlled with no objective synovitis and normal labs; will continue methotrexate plus hydroxychloroquine and celecoxib prn    Plan lab 3 mo and Return To Clinic 3 mo with her sister

## 2024-05-24 NOTE — PROGRESS NOTES
"Subjective:       Patient ID: Sherrell Yip is a 64 y.o. female.    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 Negative for fever.   HENT:  Negative for mouth sores and trouble swallowing.         Dry mouth   Eyes:  Negative for redness.        Dry eyes   Respiratory:  Negative for cough and shortness of breath.    Cardiovascular:  Negative for chest pain.   Gastrointestinal:  Negative for abdominal pain, constipation and diarrhea.   Genitourinary:  Negative for dysuria and genital sores.   Skin:  Negative for color change and rash.   Neurological:  Negative for headaches.   Hematological:  Negative for adenopathy. Does not bruise/bleed easily.   Psychiatric/Behavioral:  Positive for sleep disturbance.          Objective:   /77   Pulse 66   Ht 5' 7" (1.702 m)   Wt 100.8 kg (222 lb 3.6 oz)   BMI 34.81 kg/m²      Physical Exam     No swollen, tender joints     4/5/2022 9/12/2022 2/14/2023 5/24/2024   Tender (VALDERRAMA-28) 2 / 28 1 / 28  0 / 28  0 / 28    Swollen (VALDERRAMA-28) 0 / 28 1 / 28  0 / 28  0 / 28    Provider Global 20 mm 0 mm 0 mm 10 mm   Patient Global -- 50 mm 50 mm 50 mm   ESR 17 mm/hr 33 mm/hr 19 mm/hr 9 mm/hr   CRP 3 mg/L 6.7 mg/L -- 6 mg/L   VALDERRAMA-28 (ESR) -- 3.99 (Moderate disease activity) 2.76 (Low disease activity) 2.24 (Remission)   VALDERRAMA-28 (CRP) -- 3.23 (Moderate disease activity) -- 2.36 (Remission) "   CDAI Score -- 7  5  6      Lab Results   Component Value Date    SEDRATE 9 04/15/2024          Assessment:       1. Rheumatoid arthritis involving multiple sites with positive rheumatoid factor    2. Vaginal yeast infection    3. High risk medication use    4. Immunosuppression due to drug therapy    5. History of bilateral knee replacement            Plan:       Problem List Items Addressed This Visit          Active Problems    Rheumatoid arthritis involving multiple sites with positive rheumatoid factor - Primary (Chronic)     Some subjective symptoms but rheumatoid arthritis appears well controlled with no objective synovitis and normal labs; will continue methotrexate plus hydroxychloroquine and celecoxib prn    Plan lab 3 mo and Return To Clinic 3 mo with her sister         High risk medication use (Chronic)     No treatment related adverse effects; will continue to monitor for drug toxicity           History of bilateral knee replacement (Chronic)     Excellent results; she is very happy with her ability to walk now         Immunosuppression due to drug therapy     No interval infections         Vaginal yeast infection    Relevant Medications    fluconazole (DIFLUCAN) 150 MG Tab

## 2024-06-05 DIAGNOSIS — M05.79 RHEUMATOID ARTHRITIS INVOLVING MULTIPLE SITES WITH POSITIVE RHEUMATOID FACTOR: Chronic | ICD-10-CM

## 2024-06-06 RX ORDER — FOLIC ACID 1 MG/1
1000 TABLET ORAL
Qty: 90 TABLET | Refills: 3 | Status: SHIPPED | OUTPATIENT
Start: 2024-06-06

## 2024-07-23 DIAGNOSIS — R30.0 DYSURIA: ICD-10-CM

## 2024-07-25 RX ORDER — NITROFURANTOIN 25; 75 MG/1; MG/1
100 CAPSULE ORAL 2 TIMES DAILY
Qty: 20 CAPSULE | Refills: 0 | Status: SHIPPED | OUTPATIENT
Start: 2024-07-25

## 2024-08-15 ENCOUNTER — OFFICE VISIT (OUTPATIENT)
Dept: RHEUMATOLOGY | Facility: CLINIC | Age: 64
End: 2024-08-15
Payer: MEDICARE

## 2024-08-15 VITALS
BODY MASS INDEX: 35.12 KG/M2 | WEIGHT: 223.75 LBS | HEIGHT: 67 IN | DIASTOLIC BLOOD PRESSURE: 75 MMHG | HEART RATE: 63 BPM | SYSTOLIC BLOOD PRESSURE: 124 MMHG

## 2024-08-15 DIAGNOSIS — M05.79 RHEUMATOID ARTHRITIS INVOLVING MULTIPLE SITES WITH POSITIVE RHEUMATOID FACTOR: Chronic | ICD-10-CM

## 2024-08-15 DIAGNOSIS — B37.31 VAGINAL YEAST INFECTION: ICD-10-CM

## 2024-08-15 DIAGNOSIS — Z79.899 HIGH RISK MEDICATION USE: Primary | Chronic | ICD-10-CM

## 2024-08-15 PROCEDURE — 99999 PR PBB SHADOW E&M-EST. PATIENT-LVL IV: CPT | Mod: PBBFAC,,, | Performed by: INTERNAL MEDICINE

## 2024-08-15 RX ORDER — TRETINOIN 0.25 MG/G
1 CREAM TOPICAL NIGHTLY
COMMUNITY
Start: 2024-06-30

## 2024-08-15 RX ORDER — METHOTREXATE 2.5 MG/1
15 TABLET ORAL
Qty: 78 TABLET | Refills: 0 | Status: SHIPPED | OUTPATIENT
Start: 2024-08-15

## 2024-08-15 RX ORDER — FLUCONAZOLE 150 MG/1
150 TABLET ORAL
Qty: 3 TABLET | Refills: 5 | Status: SHIPPED | OUTPATIENT
Start: 2024-08-15

## 2024-08-15 ASSESSMENT — ROUTINE ASSESSMENT OF PATIENT INDEX DATA (RAPID3)
TOTAL RAPID3 SCORE: 0
PATIENT GLOBAL ASSESSMENT SCORE: 0
PAIN SCORE: 0
FATIGUE SCORE: 6
MDHAQ FUNCTION SCORE: 0
PSYCHOLOGICAL DISTRESS SCORE: 4.4

## 2024-08-15 NOTE — PROGRESS NOTES
"Subjective:       Patient ID: Sherrell Yip is a 64 y.o. female.    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 Negative for fever and unexpected weight change.   HENT:  Negative for mouth sores and trouble swallowing.         Dry mouth   Eyes:  Negative for redness.        Dry eyes   Respiratory:  Negative for cough and shortness of breath.    Cardiovascular:  Negative for chest pain.   Gastrointestinal:  Positive for diarrhea. Negative for abdominal pain and constipation.   Genitourinary:  Negative for dysuria and genital sores.   Skin:  Negative for color change and rash.   Neurological:  Negative for headaches.   Hematological:  Negative for adenopathy. Does not bruise/bleed easily.   Psychiatric/Behavioral:  Positive for sleep disturbance.          Objective:   /75 (BP Location: Left arm, Patient Position: Sitting, BP Method: Large (Automatic))   Pulse 63   Ht 5' 7" (1.702 m)   Wt 101.5 kg (223 lb 12.3 oz)   BMI 35.05 kg/m²      Physical Exam       9/12/2022 2/14/2023 5/24/2024 8/15/2024   Tender (VALDERRAMA-28) 1 / 28  0 / 28  0 / 28  0 / 28    Swollen (VALDERRAAM-28) 1 / 28  0 / 28  0 / 28  0 / 28    Provider Global 0 mm 0 mm 10 mm 0 mm   Patient Global 50 mm 50 mm 50 mm 0 mm   ESR 33 mm/hr 19 mm/hr 9 mm/hr 11 mm/hr   CRP 6.7 mg/L -- 6 mg/L 5.7 mg/L   VALDERRAMA-28 (ESR) 3.99 (Moderate " disease activity) 2.76 (Low disease activity) 2.24 (Remission) 1.68 (Remission)   VALDERRAMA-28 (CRP) 3.23 (Moderate disease activity) -- 2.36 (Remission) 1.64 (Remission)   CDAI Score 7  5  6  0       Assessment:       1. High risk medication use    2. Rheumatoid arthritis involving multiple sites with positive rheumatoid factor    3. Vaginal yeast infection            Plan:       Problem List Items Addressed This Visit          Active Problems    Rheumatoid arthritis involving multiple sites with positive rheumatoid factor (Chronic)     Rheumatoid arthritis controlled on current combination therapy with methotrexate and hydroxychloroquine     Will continue same and get lab for toxicity and disease monitoring at 3 mo    Return with her sister in           Relevant Medications    methotrexate 2.5 MG Tab    Other Relevant Orders    Comprehensive Metabolic Panel    CBC Auto Differential    Sedimentation rate    C-Reactive Protein    High risk medication use - Primary (Chronic)     No treatment related adverse effects; will continue to monitor for drug toxicity           Vaginal yeast infection    Relevant Medications    fluconazole (DIFLUCAN) 150 MG Tab

## 2024-08-15 NOTE — ASSESSMENT & PLAN NOTE
Rheumatoid arthritis controlled on current combination therapy with methotrexate and hydroxychloroquine     Will continue same and get lab for toxicity and disease monitoring at 3 mo    Return with her sister in 3m

## 2024-11-06 ENCOUNTER — OFFICE VISIT (OUTPATIENT)
Dept: RHEUMATOLOGY | Facility: CLINIC | Age: 64
End: 2024-11-06
Payer: MEDICARE

## 2024-11-06 VITALS
HEIGHT: 67 IN | WEIGHT: 224 LBS | HEART RATE: 76 BPM | BODY MASS INDEX: 35.16 KG/M2 | DIASTOLIC BLOOD PRESSURE: 82 MMHG | SYSTOLIC BLOOD PRESSURE: 124 MMHG

## 2024-11-06 DIAGNOSIS — Z79.899 IMMUNOSUPPRESSION DUE TO DRUG THERAPY: ICD-10-CM

## 2024-11-06 DIAGNOSIS — Z79.899 HIGH RISK MEDICATION USE: Chronic | ICD-10-CM

## 2024-11-06 DIAGNOSIS — M05.79 RHEUMATOID ARTHRITIS INVOLVING MULTIPLE SITES WITH POSITIVE RHEUMATOID FACTOR: Primary | Chronic | ICD-10-CM

## 2024-11-06 DIAGNOSIS — E66.1 CLASS 2 DRUG-INDUCED OBESITY WITH SERIOUS COMORBIDITY AND BODY MASS INDEX (BMI) OF 35.0 TO 35.9 IN ADULT: Chronic | ICD-10-CM

## 2024-11-06 DIAGNOSIS — D84.821 IMMUNOSUPPRESSION DUE TO DRUG THERAPY: ICD-10-CM

## 2024-11-06 DIAGNOSIS — E66.812 CLASS 2 DRUG-INDUCED OBESITY WITH SERIOUS COMORBIDITY AND BODY MASS INDEX (BMI) OF 35.0 TO 35.9 IN ADULT: Chronic | ICD-10-CM

## 2024-11-06 DIAGNOSIS — E66.01 SEVERE OBESITY (BMI 35.0-39.9) WITH COMORBIDITY: ICD-10-CM

## 2024-11-06 PROBLEM — N39.0 RECURRENT UTI: Status: ACTIVE | Noted: 2024-09-26

## 2024-11-06 PROCEDURE — 4010F ACE/ARB THERAPY RXD/TAKEN: CPT | Mod: CPTII,S$GLB,, | Performed by: INTERNAL MEDICINE

## 2024-11-06 PROCEDURE — 1159F MED LIST DOCD IN RCRD: CPT | Mod: CPTII,S$GLB,, | Performed by: INTERNAL MEDICINE

## 2024-11-06 PROCEDURE — 99999 PR PBB SHADOW E&M-EST. PATIENT-LVL IV: CPT | Mod: PBBFAC,,, | Performed by: INTERNAL MEDICINE

## 2024-11-06 PROCEDURE — 1160F RVW MEDS BY RX/DR IN RCRD: CPT | Mod: CPTII,S$GLB,, | Performed by: INTERNAL MEDICINE

## 2024-11-06 PROCEDURE — 3079F DIAST BP 80-89 MM HG: CPT | Mod: CPTII,S$GLB,, | Performed by: INTERNAL MEDICINE

## 2024-11-06 PROCEDURE — 3008F BODY MASS INDEX DOCD: CPT | Mod: CPTII,S$GLB,, | Performed by: INTERNAL MEDICINE

## 2024-11-06 PROCEDURE — 3074F SYST BP LT 130 MM HG: CPT | Mod: CPTII,S$GLB,, | Performed by: INTERNAL MEDICINE

## 2024-11-06 PROCEDURE — 99214 OFFICE O/P EST MOD 30 MIN: CPT | Mod: S$GLB,,, | Performed by: INTERNAL MEDICINE

## 2024-11-06 ASSESSMENT — ROUTINE ASSESSMENT OF PATIENT INDEX DATA (RAPID3)
TOTAL RAPID3 SCORE: 2.33
AM STIFFNESS SCORE: 1, YES
WHEN YOU AWAKENED IN THE MORNING OVER THE LAST WEEK, PLEASE INDICATE THE AMOUNT OF TIME IT TAKES UNTIL YOU ARE AS LIMBER AS YOU WILL BE FOR THE DAY: 2
FATIGUE SCORE: 5
PAIN SCORE: 5
MDHAQ FUNCTION SCORE: 0
PATIENT GLOBAL ASSESSMENT SCORE: 2
PSYCHOLOGICAL DISTRESS SCORE: 4.2

## 2024-11-06 NOTE — ASSESSMENT & PLAN NOTE
Rheumatoid arthritis controlled with remission on drugs     No adverse effects to meds except currently dealing with UTI    Will continue methotrexate and hydroxychloroquine   Continue lab q3m, eye check q year  Return To Clinic me 6 mo

## 2024-11-06 NOTE — PROGRESS NOTES
"Subjective:       Patient ID: Sherrell Yip is a 64 y.o. female.    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[place]      CCP+ RA with positive ANTONIA    MTX since Oct 2015     mg/d since April 2016 -Nov 2016 when switched to LFA  LFA added Nov 2016 then stopped 2017 due to elevated AST/ALT  Prednisone bridge 1 month, July 2015  NSAID: hx Celebrex; hx ibuprofen caused stomach ache  HCQ since  June 2017     S/p TKP bilaterally  Hx breast cancer 2010     From Aug to now she had been dealing with UTI; currently on macrobid; c/o spine pain but not too bad with dysuria but uneasy feeling ; also on a supplement for it    No recent steroid  Took a diflucan last week but it did not help    Rheumatoid arthritis has been OK; some morning pain then clears up    Gaining weight and not exercising     Review of Systems   Constitutional:  Positive for fatigue and unexpected weight change. Negative for fever.   HENT:  Negative for mouth sores and trouble swallowing.         Dry mouth   Eyes:  Negative for redness.        Dry eyes   Respiratory:  Negative for cough and shortness of breath.    Cardiovascular:  Negative for chest pain.   Gastrointestinal:  Positive for diarrhea. Negative for abdominal pain and constipation.   Genitourinary:  Positive for dysuria. Negative for genital sores.   Skin:  Negative for color change and rash.   Neurological:  Negative for headaches.   Hematological:  Negative for adenopathy. Does not bruise/bleed easily.   Psychiatric/Behavioral:  Positive for sleep disturbance.          Objective:   /82 (BP Location: Right arm, Patient Position: Sitting)   Pulse 76   Ht 5' 7" (1.702 m)   Wt 101.6 kg (223 lb 15.8 oz)   BMI 35.08 kg/m²      Physical Exam   Eyes: Conjunctivae are normal.   Cardiovascular: Normal rate and regular rhythm.   Pulmonary/Chest: She has no wheezes. She has no rales.   Musculoskeletal:         General: No swelling, " tenderness or deformity.   Lymphadenopathy:     She has no cervical adenopathy.   Skin: No rash noted.             2/14/2023 5/24/2024 8/15/2024 11/6/2024   Tender (VALDERRAMA-28) 0 / 28  0 / 28  0 / 28  0 / 28    Swollen (VALDERRAMA-28) 0 / 28  0 / 28  0 / 28  0 / 28    Provider Global 0 / 100 10 / 100 0 / 100 10 / 100   Patient Global 50 / 100 50 / 100 0 / 100 20 / 100   ESR 19 mm/hr 9 mm/hr 11 mm/hr 11 mm/hr   CRP -- 6 mg/L 5.7 mg/L 4.6 mg/L   VALDERRAMA-28 (ESR) 2.76 (Low disease activity) 2.24 (Remission) 1.68 (Remission) 1.96 (Remission)   VALDERRAMA-28 (CRP) -- 2.36 (Remission) 1.64 (Remission) 1.86 (Remission)   CDAI Score 5  6  0  3      Lab Results   Component Value Date    SEDRATE 11 10/28/2024          Assessment:       1. Rheumatoid arthritis involving multiple sites with positive rheumatoid factor    2. High risk medication use    3. Immunosuppression due to drug therapy    4. Class 2 drug-induced obesity with serious comorbidity and body mass index (BMI) of 35.0 to 35.9 in adult    5. Severe obesity (BMI 35.0-39.9) with comorbidity            Plan:       Problem List Items Addressed This Visit          Active Problems    Rheumatoid arthritis involving multiple sites with positive rheumatoid factor - Primary (Chronic)     Rheumatoid arthritis controlled with remission on drugs     No adverse effects to meds except currently dealing with UTI    Will continue methotrexate and hydroxychloroquine   Continue lab q3m, eye check q year  Return To Clinic me 6 mo           High risk medication use (Chronic)     No adverse effects except possibly UTI for which is being treated         Class 2 drug-induced obesity with serious comorbidity and body mass index (BMI) of 35.0 to 35.9 in adult (Chronic)     Discussed nutrition  She has appointment with Dr Flores who may check A1c         Immunosuppression due to drug therapy     Will get flu shot with Dr Flores            Resolved Problems    RESOLVED: Severe obesity (BMI 35.0-39.9) with  comorbidity

## 2024-12-04 DIAGNOSIS — M05.79 RHEUMATOID ARTHRITIS INVOLVING MULTIPLE SITES WITH POSITIVE RHEUMATOID FACTOR: Chronic | ICD-10-CM

## 2024-12-05 RX ORDER — HYDROXYCHLOROQUINE SULFATE 200 MG/1
200 TABLET, FILM COATED ORAL 2 TIMES DAILY
Qty: 180 TABLET | Refills: 3 | Status: SHIPPED | OUTPATIENT
Start: 2024-12-05

## 2024-12-09 ENCOUNTER — PATIENT MESSAGE (OUTPATIENT)
Dept: RHEUMATOLOGY | Facility: CLINIC | Age: 64
End: 2024-12-09
Payer: MEDICARE

## 2024-12-17 DIAGNOSIS — B37.31 VAGINAL YEAST INFECTION: ICD-10-CM

## 2024-12-17 RX ORDER — FLUCONAZOLE 150 MG/1
150 TABLET ORAL
Qty: 3 TABLET | Refills: 5 | Status: SHIPPED | OUTPATIENT
Start: 2024-12-17

## 2025-03-06 LAB
ALBUMIN SERPL-MCNC: 4.2 G/DL (ref 3.6–5.1)
ALBUMIN/GLOB SERPL: 1.8 (CALC) (ref 1–2.5)
ALP SERPL-CCNC: 98 U/L (ref 37–153)
ALT SERPL-CCNC: 15 U/L (ref 6–29)
AST SERPL-CCNC: 14 U/L (ref 10–35)
BASOPHILS # BLD AUTO: 52 CELLS/UL (ref 0–200)
BASOPHILS NFR BLD AUTO: 1.4 %
BILIRUB SERPL-MCNC: 0.4 MG/DL (ref 0.2–1.2)
BUN SERPL-MCNC: 14 MG/DL (ref 7–25)
BUN/CREAT SERPL: NORMAL (CALC) (ref 6–22)
CALCIUM SERPL-MCNC: 9.1 MG/DL (ref 8.6–10.4)
CHLORIDE SERPL-SCNC: 104 MMOL/L (ref 98–110)
CO2 SERPL-SCNC: 29 MMOL/L (ref 20–32)
CREAT SERPL-MCNC: 0.82 MG/DL (ref 0.5–1.05)
CRP SERPL-MCNC: 4.1 MG/L
EGFR: 80 ML/MIN/1.73M2
EOSINOPHIL # BLD AUTO: 81 CELLS/UL (ref 15–500)
EOSINOPHIL NFR BLD AUTO: 2.2 %
ERYTHROCYTE [DISTWIDTH] IN BLOOD BY AUTOMATED COUNT: 13.1 % (ref 11–15)
ERYTHROCYTE [SEDIMENTATION RATE] IN BLOOD BY WESTERGREN METHOD: 14 MM/H
GLOBULIN SER CALC-MCNC: 2.3 G/DL (CALC) (ref 1.9–3.7)
GLUCOSE SERPL-MCNC: 88 MG/DL (ref 65–139)
HCT VFR BLD AUTO: 38 % (ref 35–45)
HGB BLD-MCNC: 12.6 G/DL (ref 11.7–15.5)
LYMPHOCYTES # BLD AUTO: 836 CELLS/UL (ref 850–3900)
LYMPHOCYTES NFR BLD AUTO: 22.6 %
MCH RBC QN AUTO: 32 PG (ref 27–33)
MCHC RBC AUTO-ENTMCNC: 33.2 G/DL (ref 32–36)
MCV RBC AUTO: 96.4 FL (ref 80–100)
MONOCYTES # BLD AUTO: 392 CELLS/UL (ref 200–950)
MONOCYTES NFR BLD AUTO: 10.6 %
NEUTROPHILS # BLD AUTO: 2338 CELLS/UL (ref 1500–7800)
NEUTROPHILS NFR BLD AUTO: 63.2 %
PLATELET # BLD AUTO: 269 THOUSAND/UL (ref 140–400)
PMV BLD REES-ECKER: 9.6 FL (ref 7.5–12.5)
POTASSIUM SERPL-SCNC: 4 MMOL/L (ref 3.5–5.3)
PROT SERPL-MCNC: 6.5 G/DL (ref 6.1–8.1)
RBC # BLD AUTO: 3.94 MILLION/UL (ref 3.8–5.1)
SODIUM SERPL-SCNC: 140 MMOL/L (ref 135–146)
WBC # BLD AUTO: 3.7 THOUSAND/UL (ref 3.8–10.8)

## 2025-03-11 DIAGNOSIS — M05.79 RHEUMATOID ARTHRITIS INVOLVING MULTIPLE SITES WITH POSITIVE RHEUMATOID FACTOR: Chronic | ICD-10-CM

## 2025-03-11 NOTE — TELEPHONE ENCOUNTER
Medication refill requested for methotrexate.  Last office visit on 11/6/24-today's appointment will be rescheduled.  Labs done at [facility] on 3/5/25.  Order pended.

## 2025-03-12 RX ORDER — METHOTREXATE 2.5 MG/1
15 TABLET ORAL
Qty: 72 TABLET | Refills: 0 | Status: SHIPPED | OUTPATIENT
Start: 2025-03-12

## 2025-03-13 ENCOUNTER — TELEPHONE (OUTPATIENT)
Dept: RHEUMATOLOGY | Facility: CLINIC | Age: 65
End: 2025-03-13
Payer: MEDICARE

## 2025-03-13 NOTE — TELEPHONE ENCOUNTER
GOSIAC. to patient and informed her of lab results.  WBC slightly low. Patient states she is taking folic acid 1 x a day.  Has upcoming appointment on 3/21/25.

## 2025-03-13 NOTE — TELEPHONE ENCOUNTER
----- Message from Med Assistant Szymanski sent at 3/11/2025  4:21 PM CDT -----  Regarding: FW: Consult  Contact: pt @ [phone] (home)    ----- Message -----  From: Chula Johnson  Sent: 3/11/2025   4:08 PM CDT  To: Dewayne RUIZ Staff  Subject: Consult                                          Consult/Advisory Name Of Caller: Sherrell Yip Contact Preference: [phone] (home)  Nature of call: message is for Cookie, pt is calling to get appt for her and sister scheduled together in am. Asking for a call back

## 2025-03-21 ENCOUNTER — OFFICE VISIT (OUTPATIENT)
Dept: RHEUMATOLOGY | Facility: CLINIC | Age: 65
End: 2025-03-21
Payer: MEDICARE

## 2025-03-21 VITALS
DIASTOLIC BLOOD PRESSURE: 83 MMHG | WEIGHT: 220.44 LBS | SYSTOLIC BLOOD PRESSURE: 133 MMHG | HEART RATE: 69 BPM | HEIGHT: 67 IN | BODY MASS INDEX: 34.6 KG/M2

## 2025-03-21 DIAGNOSIS — Z79.899 IMMUNOSUPPRESSION DUE TO DRUG THERAPY: ICD-10-CM

## 2025-03-21 DIAGNOSIS — M05.79 RHEUMATOID ARTHRITIS INVOLVING MULTIPLE SITES WITH POSITIVE RHEUMATOID FACTOR: Primary | ICD-10-CM

## 2025-03-21 DIAGNOSIS — D84.821 IMMUNOSUPPRESSION DUE TO DRUG THERAPY: ICD-10-CM

## 2025-03-21 DIAGNOSIS — Z79.899 HIGH RISK MEDICATION USE: Chronic | ICD-10-CM

## 2025-03-21 DIAGNOSIS — B37.31 VAGINAL YEAST INFECTION: ICD-10-CM

## 2025-03-21 PROCEDURE — 99999 PR PBB SHADOW E&M-EST. PATIENT-LVL IV: CPT | Mod: PBBFAC,,, | Performed by: INTERNAL MEDICINE

## 2025-03-21 RX ORDER — METHOTREXATE 2.5 MG/1
12.5 TABLET ORAL
Qty: 64 TABLET | Refills: 0 | Status: SHIPPED | OUTPATIENT
Start: 2025-03-21

## 2025-03-21 RX ORDER — FLUCONAZOLE 150 MG/1
150 TABLET ORAL
Qty: 12 TABLET | Refills: 2 | Status: SHIPPED | OUTPATIENT
Start: 2025-03-21

## 2025-03-21 ASSESSMENT — ROUTINE ASSESSMENT OF PATIENT INDEX DATA (RAPID3)
FATIGUE SCORE: 6
PATIENT GLOBAL ASSESSMENT SCORE: 1.5
TOTAL RAPID3 SCORE: 3.5
MDHAQ FUNCTION SCORE: 0
PSYCHOLOGICAL DISTRESS SCORE: 2.2
PAIN SCORE: 9
AM STIFFNESS SCORE: 0, NO

## 2025-03-21 NOTE — ASSESSMENT & PLAN NOTE
Mild leukopenia may be due to methotrexate; will decrease dose and continue folic acid  Had outside eye check that was ok for hydroxychloroquine

## 2025-03-21 NOTE — PROGRESS NOTES
"Subjective:       Patient ID: Sherrell Yip is a 65 y.o. female.    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[place]      CCP+ RA with positive ANTONIA    MTX since Oct 2015     mg/d since April 2016 -Nov 2016 when switched to LFA  LFA added Nov 2016 then stopped 2017 due to elevated AST/ALT  Prednisone bridge 1 month, July 2015  NSAID: hx Celebrex; hx ibuprofen caused stomach ache  HCQ since  June 2017     S/p TKP bilaterally  Hx breast cancer 2010     Continues to deal with recurring UTI    Rheumatoid arthritis has been stable on methotrexate and hydroxychloroquine   Some L shoulder pain usually relieved by celecoxib    Has not been exercising and has gained weight    Review of Systems   Constitutional:  Positive for fatigue and unexpected weight change. Negative for fever.   HENT:  Negative for mouth sores and trouble swallowing.         Dry mouth   Eyes:  Positive for redness.        Dry eyes   Respiratory:  Negative for cough and shortness of breath.    Cardiovascular:  Negative for chest pain.   Gastrointestinal:  Positive for diarrhea. Negative for abdominal pain and constipation.   Genitourinary:  Negative for dysuria and genital sores.   Skin:  Negative for color change and rash.   Neurological:  Positive for headaches.   Hematological:  Negative for adenopathy. Does not bruise/bleed easily.   Psychiatric/Behavioral:  Positive for sleep disturbance.         Up at night for bathroom and sleepy in a.m.         Objective:   /83 (BP Location: Right arm, Patient Position: Sitting)   Pulse 69   Ht 5' 7" (1.702 m)   Wt 100 kg (220 lb 7.4 oz)   BMI 34.53 kg/m²      Physical Exam   Eyes: Conjunctivae are normal.   Cardiovascular: Normal rate and regular rhythm.   Pulmonary/Chest: She has no wheezes. She has no rales.   Musculoskeletal:         General: No swelling or tenderness.   Lymphadenopathy:     She has no cervical adenopathy.   Skin: No rash noted.    "          5/24/2024 8/15/2024 11/6/2024 3/21/2025   Tender (VALDERRAMA-28) 0 / 28  0 / 28  0 / 28  1 / 28    Swollen (VALDERRAMA-28) 0 / 28  0 / 28  0 / 28  0 / 28    Provider Global 10 / 100 0 / 100 10 / 100 5 / 100   Patient Global 50 / 100 0 / 100 20 / 100 15 / 100   ESR 9 mm/hr 11 mm/hr 11 mm/hr 14 mm/hr   CRP 6 mg/L 5.7 mg/L 4.6 mg/L 4.1 mg/L   VALDERRAMA-28 (ESR) 2.24 (Remission) 1.68 (Remission) 1.96 (Remission) 2.62 (Low disease activity)   VALDERRAMA-28 (CRP) 2.36 (Remission) 1.64 (Remission) 1.86 (Remission) 2.32 (Remission)   CDAI Score 6  0  3  3      Lab Results   Component Value Date    SEDRATE 14 03/05/2025          Assessment:       1. Rheumatoid arthritis involving multiple sites with positive rheumatoid factor    2. Vaginal yeast infection    3. High risk medication use    4. Immunosuppression due to drug therapy            Plan:       Problem List Items Addressed This Visit          Active Problems    Rheumatoid arthritis involving multiple sites with positive rheumatoid factor - Primary (Chronic)    Rheumatoid arthritis very well controlled on methotrexate plus hydroxychloroquine and had mild leukopenia as well as recurring urinary tract infections so will decrease methotrexate from 15 to 12.5 mg/week and continue hydroxychloroquine and plan continue lab q3m         Relevant Medications    methotrexate 2.5 MG Tab    Other Relevant Orders    Comprehensive Metabolic Panel    CBC Auto Differential    Sedimentation rate    C-Reactive Protein    High risk medication use (Chronic)    Mild leukopenia may be due to methotrexate; will decrease dose and continue folic acid  Had outside eye check that was ok for hydroxychloroquine           Immunosuppression due to drug therapy    Recurring UTI ; will follow up with          Vaginal yeast infection    Relevant Medications    fluconazole (DIFLUCAN) 150 MG Tab

## 2025-03-21 NOTE — ASSESSMENT & PLAN NOTE
Rheumatoid arthritis very well controlled on methotrexate plus hydroxychloroquine and had mild leukopenia as well as recurring urinary tract infections so will decrease methotrexate from 15 to 12.5 mg/week and continue hydroxychloroquine and plan continue lab q3m

## 2025-04-16 DIAGNOSIS — M05.79 RHEUMATOID ARTHRITIS INVOLVING MULTIPLE SITES WITH POSITIVE RHEUMATOID FACTOR: Chronic | ICD-10-CM

## 2025-04-17 RX ORDER — FOLIC ACID 1 MG/1
1000 TABLET ORAL
Qty: 90 TABLET | Refills: 3 | Status: SHIPPED | OUTPATIENT
Start: 2025-04-17

## 2025-05-19 DIAGNOSIS — M05.79 RHEUMATOID ARTHRITIS INVOLVING MULTIPLE SITES WITH POSITIVE RHEUMATOID FACTOR: ICD-10-CM

## 2025-05-19 NOTE — TELEPHONE ENCOUNTER
Medication refill requested for methotrexate 2.5 mg tabs.  Last office visit on 3/21/25 and labs done on 3/5/2025.  Order pended.

## 2025-05-20 RX ORDER — METHOTREXATE 2.5 MG/1
12.5 TABLET ORAL
Qty: 64 TABLET | Refills: 0 | Status: SHIPPED | OUTPATIENT
Start: 2025-05-20

## 2025-07-10 DIAGNOSIS — M05.79 RHEUMATOID ARTHRITIS INVOLVING MULTIPLE SITES WITH POSITIVE RHEUMATOID FACTOR: ICD-10-CM

## 2025-07-11 RX ORDER — METHOTREXATE 2.5 MG/1
12.5 TABLET ORAL
Qty: 64 TABLET | Refills: 0 | Status: SHIPPED | OUTPATIENT
Start: 2025-07-11

## 2025-07-11 NOTE — TELEPHONE ENCOUNTER
Medication refill requested for methotrexate.  Last office visit on 3/21/25. Labs done on 3/5/25. Patient has an upcoming appointment on 7/22.    Lab orders needed.

## 2025-07-22 ENCOUNTER — OFFICE VISIT (OUTPATIENT)
Dept: RHEUMATOLOGY | Facility: CLINIC | Age: 65
End: 2025-07-22
Payer: MEDICARE

## 2025-07-22 VITALS
BODY MASS INDEX: 34.26 KG/M2 | WEIGHT: 218.25 LBS | HEIGHT: 67 IN | SYSTOLIC BLOOD PRESSURE: 136 MMHG | HEART RATE: 70 BPM | DIASTOLIC BLOOD PRESSURE: 88 MMHG

## 2025-07-22 DIAGNOSIS — Z79.899 HIGH RISK MEDICATION USE: Chronic | ICD-10-CM

## 2025-07-22 DIAGNOSIS — R05.8 OTHER COUGH: ICD-10-CM

## 2025-07-22 DIAGNOSIS — M05.79 RHEUMATOID ARTHRITIS INVOLVING MULTIPLE SITES WITH POSITIVE RHEUMATOID FACTOR: Primary | Chronic | ICD-10-CM

## 2025-07-22 PROCEDURE — 4010F ACE/ARB THERAPY RXD/TAKEN: CPT | Mod: CPTII,S$GLB,, | Performed by: INTERNAL MEDICINE

## 2025-07-22 PROCEDURE — 3044F HG A1C LEVEL LT 7.0%: CPT | Mod: CPTII,S$GLB,, | Performed by: INTERNAL MEDICINE

## 2025-07-22 PROCEDURE — 3079F DIAST BP 80-89 MM HG: CPT | Mod: CPTII,S$GLB,, | Performed by: INTERNAL MEDICINE

## 2025-07-22 PROCEDURE — 3075F SYST BP GE 130 - 139MM HG: CPT | Mod: CPTII,S$GLB,, | Performed by: INTERNAL MEDICINE

## 2025-07-22 PROCEDURE — G2211 COMPLEX E/M VISIT ADD ON: HCPCS | Mod: S$GLB,,, | Performed by: INTERNAL MEDICINE

## 2025-07-22 PROCEDURE — 1159F MED LIST DOCD IN RCRD: CPT | Mod: CPTII,S$GLB,, | Performed by: INTERNAL MEDICINE

## 2025-07-22 PROCEDURE — 3008F BODY MASS INDEX DOCD: CPT | Mod: CPTII,S$GLB,, | Performed by: INTERNAL MEDICINE

## 2025-07-22 PROCEDURE — 99999 PR PBB SHADOW E&M-EST. PATIENT-LVL IV: CPT | Mod: PBBFAC,,, | Performed by: INTERNAL MEDICINE

## 2025-07-22 PROCEDURE — 1160F RVW MEDS BY RX/DR IN RCRD: CPT | Mod: CPTII,S$GLB,, | Performed by: INTERNAL MEDICINE

## 2025-07-22 PROCEDURE — 99214 OFFICE O/P EST MOD 30 MIN: CPT | Mod: S$GLB,,, | Performed by: INTERNAL MEDICINE

## 2025-07-22 RX ORDER — BENZONATATE 200 MG/1
200 CAPSULE ORAL 3 TIMES DAILY PRN
Qty: 30 CAPSULE | Refills: 2 | Status: SHIPPED | OUTPATIENT
Start: 2025-07-22 | End: 2025-08-01

## 2025-07-22 RX ORDER — POTASSIUM CHLORIDE 750 MG/1
10 TABLET, EXTENDED RELEASE ORAL DAILY
COMMUNITY
Start: 2025-05-28

## 2025-07-22 RX ORDER — BUPROPION HYDROCHLORIDE 75 MG/1
75 TABLET ORAL 2 TIMES DAILY
COMMUNITY
Start: 2025-05-28

## 2025-07-22 RX ORDER — LEVOTHYROXINE SODIUM 112 UG/1
112 TABLET ORAL EVERY MORNING
COMMUNITY
Start: 2025-06-03 | End: 2026-06-03

## 2025-07-22 RX ORDER — METHOTREXATE 2.5 MG/1
12.5 TABLET ORAL
Qty: 64 TABLET | Refills: 0 | Status: SHIPPED | OUTPATIENT
Start: 2025-07-22

## 2025-07-22 ASSESSMENT — ROUTINE ASSESSMENT OF PATIENT INDEX DATA (RAPID3)
PAIN SCORE: 3.5
FATIGUE SCORE: 5
AM STIFFNESS SCORE: 0, NO
MDHAQ FUNCTION SCORE: 0
TOTAL RAPID3 SCORE: 2.67
PSYCHOLOGICAL DISTRESS SCORE: 3.3
PATIENT GLOBAL ASSESSMENT SCORE: 4.5

## 2025-07-22 NOTE — ASSESSMENT & PLAN NOTE
Rheumatoid arthritis is clinically stable and controlled on methotrexate plus hydroxychloroquine     Continue current,; return to clinic 3-4 month with lab

## 2025-07-22 NOTE — PROGRESS NOTES
"Subjective:       Patient ID: Sherrell Yip is a 65 y.o. female.    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[place]      CCP+ RA with positive ANTONIA    MTX since Oct 2015     mg/d since April 2016 -Nov 2016 when switched to LFA  LFA added Nov 2016 then stopped 2017 due to elevated AST/ALT  Prednisone bridge 1 month, July 2015  NSAID: hx Celebrex; hx ibuprofen caused stomach ache  HCQ since  June 2017     S/p TKP bilaterally  Hx breast cancer 2010      Continues to deal with recurring UTI     Rheumatoid arthritis has been stable on methotrexate and hydroxychloroquine   Rare celecoxib since knee replacement    Sometimes hands tight  No swollen joints  Complaints of fatigue     Has had cough for 2 weeks after initial rhinitis; still some nasal drip; taking sudafed and zyrtec    Poor sleep ; up to bathroom; awakens tired in a.m.    [facility] labs 7/15 were normal ; CBC, CMP, CRP    Review of Systems   Constitutional:  Positive for fatigue. Negative for fever and unexpected weight change.   HENT:  Negative for mouth sores and trouble swallowing.         No Dry mouth   Eyes:  Negative for redness.        No Dry eyes   Respiratory:  Positive for cough. Negative for shortness of breath.    Cardiovascular:  Negative for chest pain.   Gastrointestinal:  Positive for diarrhea. Negative for abdominal pain and constipation.   Genitourinary:  Positive for dysuria. Negative for genital sores.   Skin:  Negative for color change and rash.   Neurological:  Negative for headaches.   Hematological:  Negative for adenopathy. Does not bruise/bleed easily.   Psychiatric/Behavioral:  Positive for sleep disturbance.          Objective:   /88 (BP Location: Left arm, Patient Position: Sitting)   Pulse 70   Ht 5' 7" (1.702 m)   Wt 99 kg (218 lb 4.1 oz)   BMI 34.18 kg/m²      Physical Exam   Eyes: Conjunctivae are normal.   Cardiovascular: Normal rate and regular rhythm.   Pulmonary/Chest: " She has no wheezes. She has no rales.   Lymphadenopathy:     She has no cervical adenopathy.   Skin: No rash noted.         8/15/2024 11/6/2024 3/21/2025 7/22/2025   Tender (VALDERRAMA-28) 0 / 28  0 / 28  1 / 28  0 / 28    Swollen (VALDERRAMA-28) 0 / 28  0 / 28  0 / 28  0 / 28    Provider Global 0 / 100 10 / 100 5 / 100 5 / 100   Patient Global 0 / 100 20 / 100 15 / 100 45 / 100   ESR 11 mm/hr 11 mm/hr 14 mm/hr --   CRP 5.7 mg/L 4.6 mg/L 4.1 mg/L 9.7 mg/L   VALDERRAMA-28 (ESR) 1.68 (Remission) 1.96 (Remission) 2.62 (Low disease activity) --   VALDERRAMA-28 (CRP) 1.64 (Remission) 1.86 (Remission) 2.32 (Remission) 2.44 (Remission)   CDAI Score 0  3  3  5      Lab Results   Component Value Date    SEDRATE 14 03/05/2025          Assessment:       1. Rheumatoid arthritis involving multiple sites with positive rheumatoid factor    2. Other cough    3. High risk medication use            Plan:       Problem List Items Addressed This Visit          Active Problems    Rheumatoid arthritis involving multiple sites with positive rheumatoid factor - Primary (Chronic)    Rheumatoid arthritis is clinically stable and controlled on methotrexate plus hydroxychloroquine     Continue current,; return to clinic 3-4 month with lab         Relevant Medications    methotrexate 2.5 MG Tab    Other Relevant Orders    C-Reactive Protein    High risk medication use (Chronic)    Recent labs ok; will get eye check in August         Relevant Orders    Comprehensive Metabolic Panel    CBC Auto Differential     Other Visit Diagnoses         Other cough        Relevant Medications    benzonatate (TESSALON) 200 MG capsule

## (undated) DEVICE — GLOVE SURGICAL LATEX SZ 6

## (undated) DEVICE — DRESSING TELFA N ADH 3X8

## (undated) DEVICE — Device

## (undated) DEVICE — COVER OVERHEAD SURG LT BLUE

## (undated) DEVICE — SEE MEDLINE ITEM 154981

## (undated) DEVICE — JELLY LUBRICANT STERILE 4 OZ

## (undated) DEVICE — GLOVE SURGICAL LATEX SZ 6.5

## (undated) DEVICE — CONTAINER SPECIMEN STRL 4OZ

## (undated) DEVICE — CATH SELF-CATH FEMALE 14FR 6IN

## (undated) DEVICE — PAD PREP 50/CA

## (undated) DEVICE — SEE MEDLINE ITEM 157117

## (undated) DEVICE — SOL 9P NACL IRR PIC IL

## (undated) DEVICE — BLANKET UPPER BODY 78.7X29.9IN

## (undated) DEVICE — SEE MEDLINE ITEM 156946